# Patient Record
Sex: FEMALE | Race: WHITE | NOT HISPANIC OR LATINO | Employment: FULL TIME | URBAN - METROPOLITAN AREA
[De-identification: names, ages, dates, MRNs, and addresses within clinical notes are randomized per-mention and may not be internally consistent; named-entity substitution may affect disease eponyms.]

---

## 2020-02-05 NOTE — PROGRESS NOTES
"Pre-Visit Planning     Future Appointments   Date Time Provider Department Center   2/7/2020  8:30 AM Coming Salud Donovan MD [facility]     Arrival Time for this Appointment:  8:10 AM   Appointment Notes for this encounter:   Physical     Questionnaires Reviewed/Assigned        Patient preferred phone number: [phone]    Spoke to patient via phone. Patient does not have additional questions or concerns.        Visit is preventive. Reviewed purpose of preventive visit with patient.    Health Maintenance Due   Topic Date Due     DEXA  1950     HEPATITIS C SCREENING  1950     ANNUAL REVIEW OF HM ORDERS  1950     ADVANCE CARE PLANNING  1950     COLONOSCOPY  04/27/1960     DTAP/TDAP/TD IMMUNIZATION (1 - Tdap) 04/27/1961     LIPID  04/27/1995     ZOSTER IMMUNIZATION (1 of 2) 04/27/2000     MAMMO SCREENING  12/28/2014     MEDICARE ANNUAL WELLNESS VISIT  04/27/2015     FALL RISK ASSESSMENT  04/27/2015     PNEUMOCOCCAL IMMUNIZATION 65+ LOW/MEDIUM RISK (1 of 2 - PCV13) 04/27/2015     INFLUENZA VACCINE (1) 09/01/2019     PHQ-2  01/01/2020     Patient is due for:        [employer]  Patient is not active on [employer]. Encouraged [employer] activation.    Questionnaire Review   Advised patient to arrive early in order to complete questionnaires.    Call Summary  \"Thank you for your time today.  If anything comes up before your appointment, please feel free to contact us at [phone].\"  "

## 2020-02-07 ENCOUNTER — OFFICE VISIT (OUTPATIENT)
Dept: FAMILY MEDICINE | Facility: CLINIC | Age: 70
End: 2020-02-07
Payer: COMMERCIAL

## 2020-02-07 VITALS
HEART RATE: 68 BPM | WEIGHT: 200 LBS | SYSTOLIC BLOOD PRESSURE: 138 MMHG | RESPIRATION RATE: 16 BRPM | BODY MASS INDEX: 33.32 KG/M2 | TEMPERATURE: 97.9 F | DIASTOLIC BLOOD PRESSURE: 80 MMHG | HEIGHT: 65 IN

## 2020-02-07 DIAGNOSIS — I10 BENIGN ESSENTIAL HYPERTENSION: ICD-10-CM

## 2020-02-07 DIAGNOSIS — L98.9 SKIN LESION: ICD-10-CM

## 2020-02-07 DIAGNOSIS — Z12.31 VISIT FOR SCREENING MAMMOGRAM: ICD-10-CM

## 2020-02-07 DIAGNOSIS — Z13.820 SCREENING FOR OSTEOPOROSIS: ICD-10-CM

## 2020-02-07 DIAGNOSIS — Z00.00 ENCOUNTER FOR MEDICARE ANNUAL WELLNESS EXAM: Primary | ICD-10-CM

## 2020-02-07 DIAGNOSIS — E78.5 HYPERLIPIDEMIA, UNSPECIFIED HYPERLIPIDEMIA TYPE: ICD-10-CM

## 2020-02-07 LAB
ALBUMIN SERPL-MCNC: 3.5 G/DL (ref 3.4–5)
ALP SERPL-CCNC: 55 U/L (ref 40–150)
ALT SERPL W P-5'-P-CCNC: 29 U/L (ref 0–50)
ANION GAP SERPL CALCULATED.3IONS-SCNC: 5 MMOL/L (ref 3–14)
AST SERPL W P-5'-P-CCNC: 18 U/L (ref 0–45)
BILIRUB SERPL-MCNC: 0.6 MG/DL (ref 0.2–1.3)
BUN SERPL-MCNC: 17 MG/DL (ref 7–30)
CALCIUM SERPL-MCNC: 9.3 MG/DL (ref 8.5–10.1)
CHLORIDE SERPL-SCNC: 106 MMOL/L (ref 94–109)
CHOLEST SERPL-MCNC: 200 MG/DL
CO2 SERPL-SCNC: 28 MMOL/L (ref 20–32)
CREAT SERPL-MCNC: 0.9 MG/DL (ref 0.52–1.04)
ERYTHROCYTE [DISTWIDTH] IN BLOOD BY AUTOMATED COUNT: 15.5 % (ref 10–15)
GFR SERPL CREATININE-BSD FRML MDRD: 65 ML/MIN/{1.73_M2}
GLUCOSE SERPL-MCNC: 109 MG/DL (ref 70–99)
HCT VFR BLD AUTO: 42.4 % (ref 35–47)
HDLC SERPL-MCNC: 44 MG/DL
HGB BLD-MCNC: 13.2 G/DL (ref 11.7–15.7)
LDLC SERPL CALC-MCNC: 128 MG/DL
MCH RBC QN AUTO: 26.4 PG (ref 26.5–33)
MCHC RBC AUTO-ENTMCNC: 31.1 G/DL (ref 31.5–36.5)
MCV RBC AUTO: 85 FL (ref 78–100)
NONHDLC SERPL-MCNC: 156 MG/DL
PLATELET # BLD AUTO: 284 10E9/L (ref 150–450)
POTASSIUM SERPL-SCNC: 3.9 MMOL/L (ref 3.4–5.3)
PROT SERPL-MCNC: 8.2 G/DL (ref 6.8–8.8)
RBC # BLD AUTO: 5 10E12/L (ref 3.8–5.2)
SODIUM SERPL-SCNC: 139 MMOL/L (ref 133–144)
TRIGL SERPL-MCNC: 140 MG/DL
TSH SERPL DL<=0.005 MIU/L-ACNC: 2.21 MU/L (ref 0.4–4)
WBC # BLD AUTO: 7.3 10E9/L (ref 4–11)

## 2020-02-07 PROCEDURE — 80061 LIPID PANEL: CPT | Performed by: FAMILY MEDICINE

## 2020-02-07 PROCEDURE — 80053 COMPREHEN METABOLIC PANEL: CPT | Performed by: FAMILY MEDICINE

## 2020-02-07 PROCEDURE — 99397 PER PM REEVAL EST PAT 65+ YR: CPT | Performed by: FAMILY MEDICINE

## 2020-02-07 PROCEDURE — 99213 OFFICE O/P EST LOW 20 MIN: CPT | Mod: 25 | Performed by: FAMILY MEDICINE

## 2020-02-07 PROCEDURE — 84443 ASSAY THYROID STIM HORMONE: CPT | Performed by: FAMILY MEDICINE

## 2020-02-07 PROCEDURE — 36415 COLL VENOUS BLD VENIPUNCTURE: CPT | Performed by: FAMILY MEDICINE

## 2020-02-07 PROCEDURE — 85027 COMPLETE CBC AUTOMATED: CPT | Performed by: FAMILY MEDICINE

## 2020-02-07 RX ORDER — TRIAMTERENE/HYDROCHLOROTHIAZID 37.5-25 MG
1 TABLET ORAL
COMMUNITY
Start: 2019-12-18 | End: 2020-02-07

## 2020-02-07 RX ORDER — ATENOLOL 25 MG/1
25 TABLET ORAL
COMMUNITY
Start: 2019-12-18 | End: 2020-02-07

## 2020-02-07 RX ORDER — TRIAMTERENE/HYDROCHLOROTHIAZID 37.5-25 MG
1 TABLET ORAL DAILY
Qty: 90 TABLET | Refills: 3 | Status: SHIPPED | OUTPATIENT
Start: 2020-02-07 | End: 2021-02-16

## 2020-02-07 RX ORDER — ATENOLOL 25 MG/1
25 TABLET ORAL DAILY
Qty: 90 TABLET | Refills: 3 | Status: SHIPPED | OUTPATIENT
Start: 2020-02-07 | End: 2021-02-16

## 2020-02-07 ASSESSMENT — ENCOUNTER SYMPTOMS
FEVER: 0
COUGH: 0
NAUSEA: 0
WEAKNESS: 0
DIZZINESS: 0
CONSTIPATION: 0
ABDOMINAL PAIN: 0
SORE THROAT: 0
HEADACHES: 0
PALPITATIONS: 0
PARESTHESIAS: 0
NERVOUS/ANXIOUS: 0
HEMATURIA: 0
JOINT SWELLING: 1
EYE PAIN: 0
DYSURIA: 0
HEMATOCHEZIA: 0
FREQUENCY: 0
MYALGIAS: 0
ARTHRALGIAS: 1
SHORTNESS OF BREATH: 0
BREAST MASS: 0
CHILLS: 0
DIARRHEA: 0
HEARTBURN: 0

## 2020-02-07 ASSESSMENT — MIFFLIN-ST. JEOR: SCORE: 1433.07

## 2020-02-07 ASSESSMENT — ACTIVITIES OF DAILY LIVING (ADL): CURRENT_FUNCTION: NO ASSISTANCE NEEDED

## 2020-02-07 NOTE — PROGRESS NOTES
"SUBJECTIVE:   Ailyn Beck is a 69 year old female who presents for Preventive Visit.    1.  Needs annual physical, been a year since last visit.    2.  Needs meds refilled.    3.  Red spot on chest wall that showed up one day, present now for 2-3 months, was flat, has gotten raised.  Hasnt gotten any bigger, never changed other than becoming more raised, just hasnt gone away.  Denies itching or pain.  Has vitiligo.  Around the time of onset she tried a new lotion, but does not use every day, no other breakouts along chest or neck.  Hasn't used for a few weeks, but no change or resolution.  Are you in the first 12 months of your Medicare coverage?  No    Healthy Habits:     In general, how would you rate your overall health?  Good    Frequency of exercise:  1 day/week    Duration of exercise:  15-30 minutes    Do you usually eat at least 4 servings of fruit and vegetables a day, include whole grains    & fiber and avoid regularly eating high fat or \"junk\" foods?  No    Ability to successfully perform activities of daily living:  No assistance needed    Home Safety:  No safety concerns identified    Hearing Impairment:  No hearing concerns    In the past 6 months, have you been bothered by leaking of urine? Yes    In general, how would you rate your overall mental or emotional health?  Excellent      PHQ-2 Total Score: 0    Additional concerns today:  Yes    Do you feel safe in your environment? Yes    Have you ever done Advance Care Planning? (For example, a Health Directive, POLST, or a discussion with a medical provider or your loved ones about your wishes): No, advance care planning information given to patient to review.  Patient plans to discuss their wishes with loved ones or provider.        Fall risk  Fallen 2 or more times in the past year?: No  Any fall with injury in the past year?: No    Cognitive Screening   1) Repeat 3 items (Leader, Season, Table)    2) Clock draw: NORMAL  3) 3 item recall: Recalls 2 " objects   Results: NORMAL clock, 1-2 items recalled: COGNITIVE IMPAIRMENT LESS LIKELY    Mini-CogTM Copyright ANA LUISA Kennedy. Licensed by the author for use in [facility]; reprinted with permission (colt@[email or web address]). All rights reserved.      Do you have sleep apnea, excessive snoring or daytime drowsiness?: yes    Reviewed and updated as needed this visit by clinical staff  Tobacco  Allergies  Meds  Problems  Med Hx  Surg Hx  Fam Hx  Soc Hx          Reviewed and updated as needed this visit by Provider  Tobacco  Allergies  Meds  Problems  Med Hx  Surg Hx  Soc Hx       Social History     Tobacco Use     Smoking status: Never Smoker     Smokeless tobacco: Never Used   Substance Use Topics     Alcohol use: Not Currently     If you drink alcohol do you typically have >3 drinks per day or >7 drinks per week? Not applicable    Alcohol Use 2/7/2020   Prescreen: >3 drinks/day or >7 drinks/week? Not Applicable   Prescreen: >3 drinks/day or >7 drinks/week? -   No flowsheet data found.            Current providers sharing in care for this patient include:   Patient Care Team:  Salud Etienne MD as PCP - General (Family Practice)    The following health maintenance items are reviewed in Epic and correct as of today:  Health Maintenance   Topic Date Due     DEXA  1950     ANNUAL REVIEW OF HM ORDERS  1950     ADVANCE CARE PLANNING  1950     LIPID  04/27/1995     MAMMO SCREENING  12/28/2014     ZOSTER IMMUNIZATION (2 of 2) 04/03/2020     COLONOSCOPY  01/03/2021     MEDICARE ANNUAL WELLNESS VISIT  02/07/2021     FALL RISK ASSESSMENT  02/07/2021     DTAP/TDAP/TD IMMUNIZATION (2 - Td) 11/22/2027     PHQ-2  Completed     INFLUENZA VACCINE  Completed     PNEUMOCOCCAL IMMUNIZATION 65+ LOW/MEDIUM RISK  Completed     HEPATITIS C SCREENING  Addressed     IPV IMMUNIZATION  Aged Out     MENINGITIS IMMUNIZATION  Aged Out     Lab work is in process  Labs reviewed in EPIC  BP Readings from Last 3  Encounters:   02/07/20 138/80    Wt Readings from Last 3 Encounters:   02/07/20 90.7 kg (200 lb)                  Patient Active Problem List   Diagnosis     Elevated fasting glucose     Essential hypertension     Gout     HLD (hyperlipidemia)     Vitiligo     Past Surgical History:   Procedure Laterality Date     IN TOTAL HIP REPLACEMENT      right     XR ELBOW SURGERY VITALIY RIGHT         Social History     Tobacco Use     Smoking status: Never Smoker     Smokeless tobacco: Never Used   Substance Use Topics     Alcohol use: Not Currently     Family History   Problem Relation Age of Onset     Hypertension Mother      Emphysema Father      Breast Cancer Sister      Prostate Cancer Brother          Current Outpatient Medications   Medication Sig Dispense Refill     atenolol (TENORMIN) 25 MG tablet Take 1 tablet (25 mg) by mouth daily 1 tab qd 90 tablet 3     triamterene-HCTZ (MAXZIDE-25) 37.5-25 MG tablet Take 1 tablet by mouth daily 1 tab qd 90 tablet 3     No Known Allergies  Pneumonia Vaccine:Adults age 65+ who received Pneumovax (PPSV23) at 65 years or older: Should be given PCV13 > 1 year after their most recent PPSV23  Mammogram Screening: Mammogram Screening: Patient over age 50, mutual decision to screen reflected in health maintenance.  History of abnormal Pap smear: NO - age 65 - see link Cervical Cytology Screening Guidelines    Review of Systems   Constitutional: Negative for chills and fever.   HENT: Negative for congestion, ear pain, hearing loss and sore throat.    Eyes: Negative for pain and visual disturbance.   Respiratory: Negative for cough and shortness of breath.    Cardiovascular: Negative for chest pain, palpitations and peripheral edema.   Gastrointestinal: Negative for abdominal pain, constipation, diarrhea, heartburn, hematochezia and nausea.   Breasts:  Negative for tenderness, breast mass and discharge.   Genitourinary: Positive for urgency. Negative for dysuria, frequency, genital sores,  "hematuria, pelvic pain, vaginal bleeding and vaginal discharge.   Musculoskeletal: Positive for arthralgias and joint swelling. Negative for myalgias.   Skin: Negative for rash.   Neurological: Negative for dizziness, weakness, headaches and paresthesias.   Psychiatric/Behavioral: Negative for mood changes. The patient is not nervous/anxious.      Urinary urgency at night, no issues during the day.  Arthralgia and joint swelling: Gout, controls with diet changes.     OBJECTIVE:   /80 (BP Location: Right arm, Patient Position: Chair, Cuff Size: Adult Large)   Pulse 68   Temp 97.9  F (36.6  C) (Oral)   Resp 16   Ht 1.651 m (5' 5\")   Wt 90.7 kg (200 lb)   Breastfeeding No   BMI 33.28 kg/m   Estimated body mass index is 33.28 kg/m  as calculated from the following:    Height as of this encounter: 1.651 m (5' 5\").    Weight as of this encounter: 90.7 kg (200 lb).  Physical Exam  GENERAL APPEARANCE: healthy, alert and no distress  EYES: Eyes grossly normal to inspection, PERRL and conjunctivae and sclerae normal  HENT: ear canals and TM's normal, nose and mouth without ulcers or lesions, oropharynx clear and oral mucous membranes moist  NECK: no adenopathy, no asymmetry, masses, or scars and thyroid normal to palpation  RESP: lungs clear to auscultation - no rales, rhonchi or wheezes  BREAST: normal without masses, tenderness or nipple discharge and no palpable axillary masses or adenopathy  CV: regular rate and rhythm, normal S1 S2, no S3 or S4, no murmur, click or rub, no peripheral edema and peripheral pulses strong  ABDOMEN: soft, nontender, no hepatosplenomegaly, no masses and bowel sounds normal  MS: no musculoskeletal defects are noted and gait is age appropriate without ataxia  SKIN: 3mm raised, fleshy growth on right lateral cheek.  3mm pinkinsh raised lesion on anterior chest wall.  No surrounding erythema or edema for either, non tender.  NEURO: Normal strength and tone, sensory exam grossly " normal, mentation intact and speech normal  PSYCH: mentation appears normal and affect normal/bright    Diagnostic Test Results:  Labs reviewed in Epic    ASSESSMENT / PLAN:   1. Encounter for Medicare annual wellness exam  Exam completed today.  Updated health maintenance as appropriate. Routine screening labs today.  Recommend follow up in one year or sooner as needed.  - Comprehensive metabolic panel (BMP + Alb, Alk Phos, ALT, AST, Total. Bili, TP)  - CBC with platelets  - TSH with free T4 reflex  - REVIEW OF HEALTH MAINTENANCE PROTOCOL ORDERS    2. Skin lesions  Pt had two skin lesions, one on face and one on anterior upper chest wall.  Lesion on face looks like SK, discussed shave biopsy for confirmation.  Not bothersome, so patient will monitor and contact us if she would like procedure done.  Will recommend hydrocortisone cream to anterior chest wall lesion, 1% BID x 7 days.  Unsure if lesion is inflammatory or not, if no resolution after cream will consider biopsy or referral to Derm.  Follow up as needed.    3. Benign essential hypertension  BP well controlled, will refill meds.  Labs today.  Follow up one year or sooner as needed.  - atenolol (TENORMIN) 25 MG tablet; Take 1 tablet (25 mg) by mouth daily 1 tab qd  Dispense: 90 tablet; Refill: 3  - triamterene-HCTZ (MAXZIDE-25) 37.5-25 MG tablet; Take 1 tablet by mouth daily 1 tab qd  Dispense: 90 tablet; Refill: 3  - Lipid panel reflex to direct LDL Fasting  - Comprehensive metabolic panel (BMP + Alb, Alk Phos, ALT, AST, Total. Bili, TP)  - CBC with platelets    4. Hyperlipidemia, unspecified hyperlipidemia type  Ordered labs today, further recommendations pending results.  - Lipid panel reflex to direct LDL Fasting    5. Visit for screening mammogram  - *MA Screening Digital Bilateral; Future    6. Screening for osteoporosis  - DX Hip/Pelvis/Spine; Future    7. Need for vaccination  - Shingrix out at clinic, pt will need vaccine    COUNSELING:  Reviewed  "preventive health counseling, as reflected in patient instructions       Regular exercise       Healthy diet/nutrition       Immunizations    Vaccinated for: Zoster             Osteoporosis Prevention/Bone Health       Colon cancer screening       Hepatitis C screening       Advanced Planning     Estimated body mass index is 33.28 kg/m  as calculated from the following:    Height as of this encounter: 1.651 m (5' 5\").    Weight as of this encounter: 90.7 kg (200 lb).    Weight management plan: Discussed healthy diet and exercise guidelines     reports that she has never smoked. She has never used smokeless tobacco.      Appropriate preventive services were discussed with this patient, including applicable screening as appropriate for cardiovascular disease, diabetes, osteopenia/osteoporosis, and glaucoma.  As appropriate for age/gender, discussed screening for colorectal cancer, prostate cancer, breast cancer, and cervical cancer. Checklist reviewing preventive services available has been given to the patient.    Reviewed patients plan of care and provided an AVS. The Basic Care Plan (routine screening as documented in Health Maintenance) for Ailyn meets the Care Plan requirement. This Care Plan has been established and reviewed with the Patient.    Counseling Resources:  ATP IV Guidelines  Pooled Cohorts Equation Calculator  Breast Cancer Risk Calculator  FRAX Risk Assessment  ICSI Preventive Guidelines  Dietary Guidelines for Americans, 2010  USDA's MyPlate  ASA Prophylaxis  Lung CA Screening    April CATY Donovan MD  [facility]    Identified Health Risks:  "

## 2020-02-07 NOTE — LETTER
February 10, 2020      Ailyn Beck  [address] [address]        Dear ,    We are writing to inform you of your test results.    I am writing regarding your recent lab work.  Your TSH was normal, indicating your thyroid is working appropriately.  Your total cholesterol was right at the high end of normal.  Your triglycerides were normal.  Your HDL cholesterol was slightly low at 44, with our goal being greater than 49.  Your LDL cholesterol, the bad cholesterol, was slightly elevated at 128, with goal being less than 100.  Your non-HDL cholesterol was also slightly elevated at 156, with goal being less than 130.  At this time I do not feel that you need to be on a medication for lowering your cholesterol.  I will include some information on how to improve your cholesterol numbers with lifestyle modifications.     Your metabolic panel, which looks at your electrolytes (sodium, potassium, chloride, and calcium) glucose, kidney function (BUN, creatinine, GFR), and liver function (AST, ALT, alkaline phosphatase, total protein, albumin, and total bilirubin) was within normal limits, with the exception of your fasting blood sugar being elevated at 109.  This does put you at risk for prediabetes.  Lifestyle modifications including watching your processed sugars, weight loss, and increased exercise may help improve this number.  We can recheck this in 1 year.     Your CBC, which looks at your white blood cells, red blood cells, hemoglobin, and platelets, was entirely within normal limits.  The RDW, MCHC, and MCH are all measures of red blood cell size.  They are very minimally abnormal, which is not likely to cause any problems, as your red blood cell count and hemoglobin are within normal limits.  These can be rechecked in 1 year.     Please contact us with any questions or concerns you may have.     Resulted Orders   Lipid panel reflex to direct LDL Fasting   Result Value Ref Range     Cholesterol 200 (H) <200 mg/dL      Comment:      Desirable:       <200 mg/dl    Triglycerides 140 <150 mg/dL      Comment:      Fasting specimen    HDL Cholesterol 44 (L) >49 mg/dL    LDL Cholesterol Calculated 128 (H) <100 mg/dL      Comment:      Above desirable:  100-129 mg/dl  Borderline High:  130-159 mg/dL  High:             160-189 mg/dL  Very high:       >189 mg/dl      Non HDL Cholesterol 156 (H) <130 mg/dL      Comment:      Above Desirable:  130-159 mg/dl  Borderline high:  160-189 mg/dl  High:             190-219 mg/dl  Very high:       >219 mg/dl     Comprehensive metabolic panel (BMP + Alb, Alk Phos, ALT, AST, Total. Bili, TP)   Result Value Ref Range    Sodium 139 133 - 144 mmol/L    Potassium 3.9 3.4 - 5.3 mmol/L    Chloride 106 94 - 109 mmol/L    Carbon Dioxide 28 20 - 32 mmol/L    Anion Gap 5 3 - 14 mmol/L    Glucose 109 (H) 70 - 99 mg/dL      Comment:      Fasting specimen    Urea Nitrogen 17 7 - 30 mg/dL    Creatinine 0.90 0.52 - 1.04 mg/dL    GFR Estimate 65 >60 mL/min/[1.73_m2]      Comment:      Non  GFR Calc  Starting 12/18/2018, serum creatinine based estimated GFR (eGFR) will be   calculated using the Chronic Kidney Disease Epidemiology Collaboration   (CKD-EPI) equation.      GFR Estimate If Black 75 >60 mL/min/[1.73_m2]      Comment:       GFR Calc  Starting 12/18/2018, serum creatinine based estimated GFR (eGFR) will be   calculated using the Chronic Kidney Disease Epidemiology Collaboration   (CKD-EPI) equation.      Calcium 9.3 8.5 - 10.1 mg/dL    Bilirubin Total 0.6 0.2 - 1.3 mg/dL    Albumin 3.5 3.4 - 5.0 g/dL    Protein Total 8.2 6.8 - 8.8 g/dL    Alkaline Phosphatase 55 40 - 150 U/L    ALT 29 0 - 50 U/L    AST 18 0 - 45 U/L   CBC with platelets   Result Value Ref Range    WBC 7.3 4.0 - 11.0 10e9/L    RBC Count 5.00 3.8 - 5.2 10e12/L    Hemoglobin 13.2 11.7 - 15.7 g/dL    Hematocrit 42.4 35.0 - 47.0 %    MCV 85 78 - 100 fl    MCH 26.4 (L) 26.5 - 33.0  pg    MCHC 31.1 (L) 31.5 - 36.5 g/dL      Comment:      Results confirmed by repeat test    RDW 15.5 (H) 10.0 - 15.0 %    Platelet Count 284 150 - 450 10e9/L   TSH with free T4 reflex   Result Value Ref Range    TSH 2.21 0.40 - 4.00 mU/L       If you have any questions or concerns, please call the clinic at the number listed above.       Sincerely,        Salud Donovan MD

## 2020-02-07 NOTE — PATIENT INSTRUCTIONS
1.  [facility] [phone],   Toll Free: [phone]           Patient Education   Personalized Prevention Plan  You are due for the preventive services outlined below.  Your care team is available to assist you in scheduling these services.  If you have already completed any of these items, please share that information with your care team to update in your medical record.  Health Maintenance Due   Topic Date Due     Osteoporosis Screening  1950     Hepatitis C Screening  1950     ANNUAL REVIEW OF HM ORDERS  1950     Discuss Advance Care Planning  1950     Colonoscopy  1960     Cholesterol Lab  1995     Zoster (Shingles) Vaccine (1 of 2) 2000     Mammogram  2014     Annual Wellness Visit  2015     FALL RISK ASSESSMENT  2015     Flu Vaccine (1) 2019

## 2020-02-19 ENCOUNTER — TELEPHONE (OUTPATIENT)
Dept: FAMILY MEDICINE | Facility: CLINIC | Age: 70
End: 2020-02-19

## 2020-02-19 NOTE — TELEPHONE ENCOUNTER
Pt called back gave the message below pt says she is not due for colonoscopy until next year. mammogram scheduled.Sherley Epstein MA  [facility].

## 2020-02-19 NOTE — TELEPHONE ENCOUNTER
Summary:    Patient is due/failing the following:   COLONOSCOPY and MAMMOGRAM    Reviewed:  [x] CARE EVERYWHERE  [x] LAST OV NOTE INCLUDING ENDO  [x] FYI TAB  [x] LAST PANEL ENCOUNTER  [x] FUTURE APTS  [x] MYCHART STATUS   [x] IMMUNIZATIONS  Action needed:   Patient needs referral/order: mammogram and colonoscopy    Type of outreach:    Phone, left message for patient to call back.                                                                                Norma Swain MA

## 2020-03-02 ENCOUNTER — ANCILLARY PROCEDURE (OUTPATIENT)
Dept: MAMMOGRAPHY | Facility: CLINIC | Age: 70
End: 2020-03-02
Payer: COMMERCIAL

## 2020-03-02 DIAGNOSIS — Z12.31 VISIT FOR SCREENING MAMMOGRAM: ICD-10-CM

## 2020-03-02 PROCEDURE — 77067 SCR MAMMO BI INCL CAD: CPT | Mod: TC

## 2020-06-25 ENCOUNTER — TELEPHONE (OUTPATIENT)
Dept: FAMILY MEDICINE | Facility: CLINIC | Age: 70
End: 2020-06-25

## 2020-06-25 NOTE — TELEPHONE ENCOUNTER
Summary:    Patient is due/failing the following:   COLONOSCOPY    Reviewed:  [x] CARE EVERYWHERE  [x] LAST OV NOTE INCLUDING ENDO  [x] FYI TAB  [x] LAST PANEL ENCOUNTER  [x] FUTURE APTS  [x] MYCHART STATUS   [x] IMMUNIZATIONS  Action needed:   Patient needs referral/order: colonoscopy    Type of outreach:    Phone, left message for patient to call back.                                                                                Norma Swain MA

## 2020-06-25 NOTE — LETTER
[facility]  [address] [id number] [phone]  July 2, 2020    Ailyn Beck  [address] [address]    Dear Ailyn,    I care about your health and have reviewed your health plan. I have reviewed your medical conditions, medication list, and lab results and am making recommendations based on this review, to better manage your health.    You are in particular need of attention regarding:  -Colon Cancer Screening    I am recommending that you:  {recommendations:-schedule a COLONOSCOPY to look for colon cancer (due every 10 years or 5 years in higher risk situations.)   Colon cancer is now the second leading cause of death in the [place] for both men and women and there are over 130,000 new cases and 50,000 deaths per year from colon cancer.  Colonoscopies can prevent 90-95% of these deaths.  Problem lesions can be removed before they ever become cancer.  This test is not only looking for cancer, but also getting rid of precancerious lesions.  If you do not wish to do a colonoscopy or cannot afford to do one, at this time, there is another option. It is called a FIT test or Fecal Immunochemical Occult Blood Test (take home stool sample kit).  It does not replace the colonoscopy for colorectal cancer screening, but it can detect hidden bleeding in the lower colon.  It does need to be repeated every year and if a positive result is obtained, you would be referred for a colonoscopy.  If you have completed either one of these tests at another facility, please have the records sent to our clinic so that we can best coordinate your care.    Here is a list of Health Maintenance topics that are due now or due soon:  Health Maintenance Due   Topic Date Due     DEXA  1950     ZOSTER IMMUNIZATION (2 of 2) 04/03/2020       Please call us at [phone] (or use [employer]) to address the above recommendations.     Thank you  for trusting [facility] and we appreciate the opportunity to serve you.  We look forward to supporting your healthcare needs in the future.    Healthy Regards,    Your [facility] Team

## 2021-02-14 DIAGNOSIS — I10 BENIGN ESSENTIAL HYPERTENSION: ICD-10-CM

## 2021-02-16 RX ORDER — ATENOLOL 25 MG/1
TABLET ORAL
Qty: 90 TABLET | Refills: 0 | Status: SHIPPED | OUTPATIENT
Start: 2021-02-16 | End: 2021-05-07

## 2021-02-16 RX ORDER — TRIAMTERENE/HYDROCHLOROTHIAZID 37.5-25 MG
TABLET ORAL
Qty: 90 TABLET | Refills: 0 | Status: SHIPPED | OUTPATIENT
Start: 2021-02-16 | End: 2021-05-07

## 2021-02-16 NOTE — TELEPHONE ENCOUNTER
3 month candelaria refill approved.    MA/TC: Please assist patient in scheduling office visit.     Annia Ellison RN on 2/16/2021 at 10:20 AM

## 2021-03-05 ENCOUNTER — IMMUNIZATION (OUTPATIENT)
Dept: NURSING | Facility: CLINIC | Age: 71
End: 2021-03-05
Payer: COMMERCIAL

## 2021-03-05 PROCEDURE — 91301 PR COVID VAC MODERNA 100 MCG/0.5 ML IM: CPT

## 2021-03-05 PROCEDURE — 0011A PR COVID VAC MODERNA 100 MCG/0.5 ML IM: CPT

## 2021-03-08 ENCOUNTER — OFFICE VISIT (OUTPATIENT)
Dept: FAMILY MEDICINE | Facility: CLINIC | Age: 71
End: 2021-03-08
Payer: COMMERCIAL

## 2021-03-08 VITALS
SYSTOLIC BLOOD PRESSURE: 126 MMHG | HEIGHT: 65 IN | DIASTOLIC BLOOD PRESSURE: 76 MMHG | RESPIRATION RATE: 18 BRPM | BODY MASS INDEX: 33.42 KG/M2 | TEMPERATURE: 97.7 F | WEIGHT: 200.6 LBS | HEART RATE: 68 BPM | OXYGEN SATURATION: 98 %

## 2021-03-08 DIAGNOSIS — E78.5 HYPERLIPIDEMIA, UNSPECIFIED HYPERLIPIDEMIA TYPE: ICD-10-CM

## 2021-03-08 DIAGNOSIS — R73.01 ELEVATED FASTING GLUCOSE: ICD-10-CM

## 2021-03-08 DIAGNOSIS — I10 BENIGN ESSENTIAL HYPERTENSION: Primary | ICD-10-CM

## 2021-03-08 DIAGNOSIS — Z78.0 POSTMENOPAUSAL STATUS: ICD-10-CM

## 2021-03-08 DIAGNOSIS — Z12.11 COLON CANCER SCREENING: ICD-10-CM

## 2021-03-08 DIAGNOSIS — M1A.09X0 CHRONIC GOUT OF MULTIPLE SITES, UNSPECIFIED CAUSE: ICD-10-CM

## 2021-03-08 DIAGNOSIS — R21 RASH: ICD-10-CM

## 2021-03-08 LAB
ERYTHROCYTE [DISTWIDTH] IN BLOOD BY AUTOMATED COUNT: 15.6 % (ref 10–15)
HBA1C MFR BLD: 5.4 % (ref 0–5.6)
HCT VFR BLD AUTO: 42.6 % (ref 35–47)
HGB BLD-MCNC: 13.2 G/DL (ref 11.7–15.7)
MCH RBC QN AUTO: 26.4 PG (ref 26.5–33)
MCHC RBC AUTO-ENTMCNC: 31 G/DL (ref 31.5–36.5)
MCV RBC AUTO: 85 FL (ref 78–100)
PLATELET # BLD AUTO: 381 10E9/L (ref 150–450)
RBC # BLD AUTO: 5 10E12/L (ref 3.8–5.2)
WBC # BLD AUTO: 6.7 10E9/L (ref 4–11)

## 2021-03-08 PROCEDURE — 80053 COMPREHEN METABOLIC PANEL: CPT | Performed by: FAMILY MEDICINE

## 2021-03-08 PROCEDURE — 85027 COMPLETE CBC AUTOMATED: CPT | Performed by: FAMILY MEDICINE

## 2021-03-08 PROCEDURE — 99214 OFFICE O/P EST MOD 30 MIN: CPT | Performed by: FAMILY MEDICINE

## 2021-03-08 PROCEDURE — 84443 ASSAY THYROID STIM HORMONE: CPT | Performed by: FAMILY MEDICINE

## 2021-03-08 PROCEDURE — 36415 COLL VENOUS BLD VENIPUNCTURE: CPT | Performed by: FAMILY MEDICINE

## 2021-03-08 PROCEDURE — 83036 HEMOGLOBIN GLYCOSYLATED A1C: CPT | Performed by: FAMILY MEDICINE

## 2021-03-08 PROCEDURE — 84550 ASSAY OF BLOOD/URIC ACID: CPT | Performed by: FAMILY MEDICINE

## 2021-03-08 PROCEDURE — 80061 LIPID PANEL: CPT | Performed by: FAMILY MEDICINE

## 2021-03-08 ASSESSMENT — MIFFLIN-ST. JEOR: SCORE: 1430.8

## 2021-03-08 NOTE — PROGRESS NOTES
"    Assessment & Plan     Benign essential hypertension  Due for labs, well controlled, refill medications today.  Follow up one year or sooner as needed.  - CBC with platelets  - Comprehensive metabolic panel (BMP + Alb, Alk Phos, ALT, AST, Total. Bili, TP)  - Lipid panel reflex to direct LDL Fasting    Chronic gout of multiple sites, unspecified cause  Will check uric acid level today, pt will continue with OTC ibuprofen as needed.  Provided with some dietary information in AVS.  - Uric acid    Elevated fasting glucose  Due for labs, further recommendations pending results.  - Hemoglobin A1c  - TSH with free T4 reflex    Hyperlipidemia, unspecified hyperlipidemia type  Due for labs, further recommendations pending results.    Rash  Dry skin, possible nonspecific dermatitis.  OTC Hydrocortisone BID x 7 days, follow up as needed.    Colon cancer screening  - GASTROENTEROLOGY ADULT REF PROCEDURE ONLY; Future    Postmenopausal status  - DX Hip/Pelvis/Spine; Future    18 minutes spent on the date of the encounter doing chart review, history and exam, documentation and further activities as noted above       BMI:   Estimated body mass index is 33.38 kg/m  as calculated from the following:    Height as of this encounter: 1.651 m (5' 5\").    Weight as of this encounter: 91 kg (200 lb 9.6 oz).   Weight management plan: Discussed healthy diet and exercise guidelines    See Patient Instructions    Return in about 1 year (around 3/8/2022) for BP Recheck.    Salud Donovan MD  [facility] GUERA Robertson is a 70 year old who presents for the following health issues     HPI       Hypertension Follow-up      Do you check your blood pressure regularly outside of the clinic? Yes     Are you following a low salt diet? Attempting    Are your blood pressures ever more than 140 on the top number (systolic) OR more than 90 on the bottom number (diastolic), for example 140/90? Yes - Upon forgetting " "Medication      How many servings of fruits and vegetables do you eat daily?  2-3    On average, how many sweetened beverages do you drink each day (Examples: soda, juice, sweet tea, etc.  Do NOT count diet or artificially sweetened beverages)?   0    How many days per week do you exercise enough to make your heart beat faster? 3 or less    How many minutes a day do you exercise enough to make your heart beat faster? 20 - 29    How many days per week do you miss taking your medication? 0    Reports BP is good overall, no concerns about this.  Well controlled with the exception of the few times she forgets her meds.  Due for labs.    Gout in right middle finger, flared today, takes ibuprofen, seems to resolve on its own.  Alternates with Tylenol when needed.  Reports she frequently has gout in multiple sites, though finger is most common for her.  Feels that her OTC meds control pain well enough.  Scared to take other medications due to kidney implications, as her  had a kidney transplant and passed from kidney failure.    Slight rash on her mid back, somewhat itchy.  Would like it looked at, as her brother's have had skin cancers in the past.    Review of Systems   Constitutional, HEENT, cardiovascular, pulmonary, gi and gu systems are negative, except as otherwise noted.      Objective    /76 (BP Location: Right arm, Patient Position: Chair, Cuff Size: Adult Large)   Pulse 68   Temp 97.7  F (36.5  C) (Oral)   Resp 18   Ht 1.651 m (5' 5\")   Wt 91 kg (200 lb 9.6 oz)   SpO2 98%   BMI 33.38 kg/m    Body mass index is 33.38 kg/m .  Physical Exam   GENERAL: healthy, alert and no distress  EYES: Eyes grossly normal to inspection, PERRL and conjunctivae and sclerae normal  RESP: lungs clear to auscultation - no rales, rhonchi or wheezes  CV: regular rate and rhythm, normal S1 S2, no S3 or S4, no murmur, click or rub, no peripheral edema and peripheral pulses strong  MS: Right 3rd digit is inflamed at " the PIP joint, slight deformity in lateral direction.  SKIN: Slightly erythematous dry skin along spine, mid back.  PSYCH: mentation appears normal, affect normal/bright

## 2021-03-08 NOTE — PATIENT INSTRUCTIONS
Hydrocortisone, 0.5% or 1%, twice per day for 7 days.    [facility] [phone],   Toll Free: [phone]       Patient Education     Gout Diet  Gout is a painful condition caused by an excess of uric acid, a waste product made by the body. Uric acid forms crystals that collect in the joints. The immune response to these crystals brings on symptoms of joint pain and swelling. This is called a gout attack. Often, medications and diet changes are combined to manage gout. Below are some guidelines for changing your diet to help you manage gout and prevent attacks. Your healthcare provider will help you determine the best eating plan for you.  Eating to manage gout  Weight loss for those who are overweight may help reduce gout attacks.  Eat less of these foods  Eating too many foods containing purines may raise the levels of uric acid in your body. This raises your risk for a gout attack. Try to limit these foods and drinks:    Alcohol, such as beer and red wine. You may be told to avoid alcohol completely.    Soft drinks that contain sugar or high fructose corn syrup    Certain fish, including anchovies, sardines, fish eggs, and herring    Shellfish    Certain meats, such as red meat, hot dogs, luncheon meats, and turkey    Organ meats, such as liver, kidneys, and sweetbreads    Legumes, such as dried beans and peas    Other high fat foods such as gravy, whole milk, and high fat cheeses    Vegetables such as asparagus, cauliflower, spinach, and mushrooms used to be thought to contribute to an increased risk for a gout attack, but recent studies show that high purine vegetables don't increase the risk for a gout attack.  Eat more of these foods  Other foods may be helpful for people with gout. Add some of these foods to your diet:    Cherries contain chemicals that may lower uric acid.    Omega fatty acids. These are found in some fatty fish such as salmon, certain oils  (flax, olive, or nut), and nuts themselves. Omega fatty acids may help prevent inflammation due to gout.    Dairy products that are low-fat or fat-free, such as cheese and yogurt    Complex carbohydrate foods, including whole grains, brown rice, oats, and beans    Coffee, in moderation    Water, approximately 64 ounces per day  Follow-up care  Follow up with your healthcare provider as advised.  When to seek medical advice  Call your healthcare provider right away if any of these occur:    Return of gout symptoms, usually at night:    Severe pain, swelling, and heat in a joint, especially the base of the big toe    Affected joint is hard to move    Skin of the affected joint is purple or red    Fever of 100.4 F (38 C) or higher    Pain that doesn't get better even with prescribed medicine   [employer] last reviewed this educational content on 6/1/2018 2000-2020 [employer]. All rights reserved. This information is not intended as a substitute for professional medical care. Always follow your healthcare professional's instructions.           Patient Education     Treating Gout Attacks     Raising the joint above the level of your heart can help reduce gout symptoms.     Gout is a disease that affects the joints. It is caused by excess uric acid in your blood that may lead to crystals forming in your joints. Left untreated, it can lead to painful foot and joint deformities and even kidney problems. But, by treating gout early, you can relieve pain and help prevent future problems. Gout can usually be treated with medicine and proper diet. In severe cases, surgery may be needed.  Gout attacks are painful and often happen more than once. Taking medicines may reduce pain and prevent attacks in the future. There are also some things you can do at home to relieve symptoms.  Medicines for gout  Your healthcare provider may prescribe a daily medicine to reduce levels of uric acid. Reducing your uric acid  levels may help prevent gout attacks. Allopurinol is one commonly used medicine taken daily to reduce uric acid levels. Other daily medicines used to reduce uric acid levels include febuxostat, lesinurad, and probencid. Other medicines can help relieve pain and swelling during an acute attack. Medicines such as NSAIDs (nonsteroidal anti-inflammatory medicines), steroids, and colchicine may be prescribed for intermittent use to relieve an acute gout attack. Be sure to take your medicine as directed.  What you can do  Below are some things you can do at home to relieve gout symptoms. Your healthcare provider may have other tips.    Rest the painful joint as much as you can.    Raise the painful joint so it is at a level higher than your heart.    Use ice for 10 minutes every 1 to 2 hours as possible.  How can I prevent gout?  With a little effort, you may be able to prevent gout attacks in the future. Here are some things you can do:    Don't eat foods high in purines  ? Certain meats (red meat, processed meat, turkey)  ? Organ meats (kidney, liver, sweetbread)  ? Shellfish (lobster, crab, shrimp, scallop, mussel)  ? Certain fish (anchovy, sardine, herring, mackerel)    Take any medicines prescribed by your healthcare provider.    Lose weight if you need to.    Reduce high fructose corn syrup in meals and drinks.    Reduce or cut out alcohol, particularly beer, but also red wine and spirits.    Control blood pressure, diabetes, and cholesterol.    Drink plenty of water to help flush uric acid from your body.  [employer] last reviewed this educational content on 4/1/2018 2000-2020 [employer]. All rights reserved. This information is not intended as a substitute for professional medical care. Always follow your healthcare professional's instructions.

## 2021-03-09 LAB
ALBUMIN SERPL-MCNC: 3.4 G/DL (ref 3.4–5)
ALP SERPL-CCNC: 50 U/L (ref 40–150)
ALT SERPL W P-5'-P-CCNC: 24 U/L (ref 0–50)
ANION GAP SERPL CALCULATED.3IONS-SCNC: 8 MMOL/L (ref 3–14)
AST SERPL W P-5'-P-CCNC: 21 U/L (ref 0–45)
BILIRUB SERPL-MCNC: 0.5 MG/DL (ref 0.2–1.3)
BUN SERPL-MCNC: 25 MG/DL (ref 7–30)
CALCIUM SERPL-MCNC: 9.1 MG/DL (ref 8.5–10.1)
CHLORIDE SERPL-SCNC: 104 MMOL/L (ref 94–109)
CHOLEST SERPL-MCNC: 181 MG/DL
CO2 SERPL-SCNC: 25 MMOL/L (ref 20–32)
CREAT SERPL-MCNC: 1.09 MG/DL (ref 0.52–1.04)
GFR SERPL CREATININE-BSD FRML MDRD: 51 ML/MIN/{1.73_M2}
GLUCOSE SERPL-MCNC: 109 MG/DL (ref 70–99)
HDLC SERPL-MCNC: 41 MG/DL
LDLC SERPL CALC-MCNC: 109 MG/DL
NONHDLC SERPL-MCNC: 140 MG/DL
POTASSIUM SERPL-SCNC: 3.2 MMOL/L (ref 3.4–5.3)
PROT SERPL-MCNC: 8.2 G/DL (ref 6.8–8.8)
SODIUM SERPL-SCNC: 137 MMOL/L (ref 133–144)
TRIGL SERPL-MCNC: 156 MG/DL
TSH SERPL DL<=0.005 MIU/L-ACNC: 1.67 MU/L (ref 0.4–4)
URATE SERPL-MCNC: 9.8 MG/DL (ref 2.6–6)

## 2021-03-12 DIAGNOSIS — M1A.09X0 CHRONIC GOUT OF MULTIPLE SITES, UNSPECIFIED CAUSE: Primary | ICD-10-CM

## 2021-03-12 DIAGNOSIS — N18.31 STAGE 3A CHRONIC KIDNEY DISEASE (H): ICD-10-CM

## 2021-03-14 ENCOUNTER — HEALTH MAINTENANCE LETTER (OUTPATIENT)
Age: 71
End: 2021-03-14

## 2021-04-02 ENCOUNTER — IMMUNIZATION (OUTPATIENT)
Dept: NURSING | Facility: CLINIC | Age: 71
End: 2021-04-02
Attending: INTERNAL MEDICINE
Payer: COMMERCIAL

## 2021-04-02 PROCEDURE — 0012A PR COVID VAC MODERNA 100 MCG/0.5 ML IM: CPT

## 2021-04-02 PROCEDURE — 91301 PR COVID VAC MODERNA 100 MCG/0.5 ML IM: CPT

## 2021-05-06 DIAGNOSIS — I10 BENIGN ESSENTIAL HYPERTENSION: ICD-10-CM

## 2021-05-07 RX ORDER — TRIAMTERENE/HYDROCHLOROTHIAZID 37.5-25 MG
TABLET ORAL
Qty: 30 TABLET | Refills: 0 | Status: SHIPPED | OUTPATIENT
Start: 2021-05-07 | End: 2021-05-24

## 2021-05-07 RX ORDER — ATENOLOL 25 MG/1
TABLET ORAL
Qty: 90 TABLET | Refills: 3 | Status: SHIPPED | OUTPATIENT
Start: 2021-05-07 | End: 2022-03-21

## 2021-05-07 NOTE — TELEPHONE ENCOUNTER
Patient did not follow up on the recheck of her potassium, please call her to schedule for additional refills

## 2021-05-19 DIAGNOSIS — N18.31 STAGE 3A CHRONIC KIDNEY DISEASE (H): ICD-10-CM

## 2021-05-19 DIAGNOSIS — M1A.09X0 CHRONIC GOUT OF MULTIPLE SITES, UNSPECIFIED CAUSE: ICD-10-CM

## 2021-05-19 PROCEDURE — 84550 ASSAY OF BLOOD/URIC ACID: CPT | Performed by: FAMILY MEDICINE

## 2021-05-19 PROCEDURE — 80053 COMPREHEN METABOLIC PANEL: CPT | Performed by: FAMILY MEDICINE

## 2021-05-19 PROCEDURE — 36415 COLL VENOUS BLD VENIPUNCTURE: CPT | Performed by: FAMILY MEDICINE

## 2021-05-20 LAB
ALBUMIN SERPL-MCNC: 3.8 G/DL (ref 3.4–5)
ALP SERPL-CCNC: 59 U/L (ref 40–150)
ALT SERPL W P-5'-P-CCNC: 28 U/L (ref 0–50)
ANION GAP SERPL CALCULATED.3IONS-SCNC: 5 MMOL/L (ref 3–14)
AST SERPL W P-5'-P-CCNC: 20 U/L (ref 0–45)
BILIRUB SERPL-MCNC: 0.5 MG/DL (ref 0.2–1.3)
BUN SERPL-MCNC: 22 MG/DL (ref 7–30)
CALCIUM SERPL-MCNC: 9.1 MG/DL (ref 8.5–10.1)
CHLORIDE SERPL-SCNC: 102 MMOL/L (ref 94–109)
CO2 SERPL-SCNC: 27 MMOL/L (ref 20–32)
CREAT SERPL-MCNC: 0.92 MG/DL (ref 0.52–1.04)
GFR SERPL CREATININE-BSD FRML MDRD: 62 ML/MIN/{1.73_M2}
GLUCOSE SERPL-MCNC: 94 MG/DL (ref 70–99)
POTASSIUM SERPL-SCNC: 4.1 MMOL/L (ref 3.4–5.3)
PROT SERPL-MCNC: 8.9 G/DL (ref 6.8–8.8)
SODIUM SERPL-SCNC: 134 MMOL/L (ref 133–144)
URATE SERPL-MCNC: 8.7 MG/DL (ref 2.6–6)

## 2021-05-24 DIAGNOSIS — I10 BENIGN ESSENTIAL HYPERTENSION: ICD-10-CM

## 2021-05-26 RX ORDER — TRIAMTERENE/HYDROCHLOROTHIAZID 37.5-25 MG
1 TABLET ORAL DAILY
Qty: 90 TABLET | Refills: 2 | Status: SHIPPED | OUTPATIENT
Start: 2021-05-26 | End: 2022-01-03

## 2021-07-26 ENCOUNTER — E-VISIT (OUTPATIENT)
Dept: FAMILY MEDICINE | Facility: CLINIC | Age: 71
End: 2021-07-26
Payer: COMMERCIAL

## 2021-07-26 DIAGNOSIS — M10.9 ACUTE GOUT OF LEFT WRIST, UNSPECIFIED CAUSE: Primary | ICD-10-CM

## 2021-07-26 PROCEDURE — 99421 OL DIG E/M SVC 5-10 MIN: CPT | Performed by: FAMILY MEDICINE

## 2021-07-28 RX ORDER — METHYLPREDNISOLONE 4 MG
TABLET, DOSE PACK ORAL
Qty: 21 TABLET | Refills: 0 | Status: SHIPPED | OUTPATIENT
Start: 2021-07-28 | End: 2022-03-06

## 2021-07-28 NOTE — TELEPHONE ENCOUNTER
Provider E-Visit time total (minutes): 8 minutes    Patient has history of gout with acute flare in left wrist.  History of CKD 3, so will avoid NSAIDs.  Treat with Medrol dose pack.

## 2021-07-28 NOTE — PATIENT INSTRUCTIONS
Jamarcus Robertson,    Thank you for choosing us for your care. I have placed an order for a prescription steroid pack so that you can start treatment. View your full visit summary for details by clicking on the link below. Your pharmacist will able to address any questions you may have about the medication.     If you're not feeling better within 5-7 days, please schedule an appointment.  You can schedule an appointment right here in [facility], or call [phone]  If the visit is for the same symptoms as your eVisit, we'll refund the cost of your eVisit if seen within seven days.

## 2021-10-24 ENCOUNTER — HEALTH MAINTENANCE LETTER (OUTPATIENT)
Age: 71
End: 2021-10-24

## 2022-03-01 ENCOUNTER — NURSE TRIAGE (OUTPATIENT)
Dept: FAMILY MEDICINE | Facility: CLINIC | Age: 72
End: 2022-03-01
Payer: COMMERCIAL

## 2022-03-01 NOTE — TELEPHONE ENCOUNTER
I would recommend that patient be seen to evaluate the concerns, to rule out possible infection, etc.  I would prefer she not wait until our visit on the 21st.

## 2022-03-01 NOTE — TELEPHONE ENCOUNTER
"S-(situation): Patient has what she thinks is a gout flair up    B-(background): patient has history of gout and kidney disease. A week and a half ago patient's pointer and ring finger on the left hand started to hurt.     A-(assessment): Pain is a 2-3 at rest but will spike up to an 8 with movement. The joints on the pointer and ring finger of the left hand are red, inflamed and painful to the touch. Patient has been taking tylenol and IBU for pain but has not found it fully effective at controlling the pain.     R-(recommendations): Requested patient be seen in  today for evaluation and blood work. Patient declined that she just wanted to know what Dr. Santo Donovan thought. She thinks she can wait until her scheduled appointment on 3/21/22 if needed.       Reason for Disposition    Looks infected (e.g., spreading redness, pus, red streak)    Answer Assessment - Initial Assessment Questions  1. ONSET: \"When did the pain start?\"       Started a week ago  2. LOCATION and RADIATION: \"Where is the pain located?\"  (e.g., fingertip, around nail, joint, entire   finger)       Left hand pointer finger and ring finger in the joints   3. SEVERITY: \"How bad is the pain?\" \"What does it keep you from doing?\"   (Scale 1-10; or mild, moderate, severe)   - MILD - doesn't interfere with normal activities    - MODERATE - interferes with normal activities or awakens from sleep   - SEVERE - excruciating pain, unable to hold a glass of water or bend finger even a little      Pain is a 8 with movement but at rest pain is a 2-3.   4. APPEARANCE: \"What does the finger look like?\" (e.g., redness, swelling, bruising, pallor)      Red and swollen.   5. WORK OR EXERCISE: \"Has there been any recent work or exercise that involved this part of the body?\"      No   6. CAUSE: \"What do you think is causing the pain?\"      Gout flair up  7. AGGRAVATING FACTORS: \"What makes the pain worse?\" (e.g., using computer)      Using a computer.   8. OTHER " "SYMPTOMS: \"Do you have any other symptoms?\" (e.g., fever, neck pain, numbness)      none  9. PREGNANCY: \"Is there any chance you are pregnant?\" \"When was your last menstrual period?\"      n/a    Protocols used: FINGER PAIN-A-[place]    Annia Ellison RN on 3/1/2022 at 4:53 PM      "

## 2022-03-02 NOTE — TELEPHONE ENCOUNTER
"Called and spoke with patient. Relayed provider recommendation below. Patient states \"the two fingers that were bad, one is improving. Not as red, not as painful, not as swollen, I'm able to bend my finger. Feels like I'm turning the corner.\". Scheduled earlier appointment with SRENE 3/4/22 for evaluation. Advised if new/worsening symptoms to be seen in UC. Patient verbalized understanding and agreed with plan.     Henrry AZUL RN    "

## 2022-03-04 ENCOUNTER — OFFICE VISIT (OUTPATIENT)
Dept: FAMILY MEDICINE | Facility: CLINIC | Age: 72
End: 2022-03-04
Payer: COMMERCIAL

## 2022-03-04 VITALS
TEMPERATURE: 97.8 F | OXYGEN SATURATION: 97 % | HEART RATE: 80 BPM | SYSTOLIC BLOOD PRESSURE: 121 MMHG | DIASTOLIC BLOOD PRESSURE: 78 MMHG | WEIGHT: 192.8 LBS | BODY MASS INDEX: 32.12 KG/M2 | RESPIRATION RATE: 16 BRPM | HEIGHT: 65 IN

## 2022-03-04 DIAGNOSIS — N18.31 STAGE 3A CHRONIC KIDNEY DISEASE (H): ICD-10-CM

## 2022-03-04 DIAGNOSIS — M10.042 ACUTE IDIOPATHIC GOUT OF LEFT HAND: Primary | ICD-10-CM

## 2022-03-04 LAB
BASOPHILS # BLD AUTO: 0 10E3/UL (ref 0–0.2)
BASOPHILS NFR BLD AUTO: 0 %
EOSINOPHIL # BLD AUTO: 0.2 10E3/UL (ref 0–0.7)
EOSINOPHIL NFR BLD AUTO: 2 %
ERYTHROCYTE [DISTWIDTH] IN BLOOD BY AUTOMATED COUNT: 15.7 % (ref 10–15)
HCT VFR BLD AUTO: 41.2 % (ref 35–47)
HGB BLD-MCNC: 12.6 G/DL (ref 11.7–15.7)
LYMPHOCYTES # BLD AUTO: 1.5 10E3/UL (ref 0.8–5.3)
LYMPHOCYTES NFR BLD AUTO: 14 %
MCH RBC QN AUTO: 26.4 PG (ref 26.5–33)
MCHC RBC AUTO-ENTMCNC: 30.6 G/DL (ref 31.5–36.5)
MCV RBC AUTO: 86 FL (ref 78–100)
MONOCYTES # BLD AUTO: 0.7 10E3/UL (ref 0–1.3)
MONOCYTES NFR BLD AUTO: 7 %
NEUTROPHILS # BLD AUTO: 7.8 10E3/UL (ref 1.6–8.3)
NEUTROPHILS NFR BLD AUTO: 77 %
PLATELET # BLD AUTO: 356 10E3/UL (ref 150–450)
RBC # BLD AUTO: 4.78 10E6/UL (ref 3.8–5.2)
WBC # BLD AUTO: 10.2 10E3/UL (ref 4–11)

## 2022-03-04 PROCEDURE — 36415 COLL VENOUS BLD VENIPUNCTURE: CPT | Performed by: NURSE PRACTITIONER

## 2022-03-04 PROCEDURE — 99214 OFFICE O/P EST MOD 30 MIN: CPT | Performed by: NURSE PRACTITIONER

## 2022-03-04 PROCEDURE — 85025 COMPLETE CBC W/AUTO DIFF WBC: CPT | Performed by: NURSE PRACTITIONER

## 2022-03-04 PROCEDURE — 84550 ASSAY OF BLOOD/URIC ACID: CPT | Performed by: NURSE PRACTITIONER

## 2022-03-04 PROCEDURE — 80053 COMPREHEN METABOLIC PANEL: CPT | Performed by: NURSE PRACTITIONER

## 2022-03-04 RX ORDER — PREDNISONE 20 MG/1
TABLET ORAL
Qty: 20 TABLET | Refills: 0 | Status: SHIPPED | OUTPATIENT
Start: 2022-03-04 | End: 2022-03-21

## 2022-03-04 NOTE — PATIENT INSTRUCTIONS
Patient Education     Eating to Prevent Gout  Gout is a painful form of arthritis caused by an excess of uric acid. This is a waste product made by the body. It builds up in the body and forms crystals that collect in the joints, causing a gout attack. Alcohol and certain foods can trigger a gout attack. Below are some guidelines for changing your diet to help you manage gout. Your healthcare provider can work with you to determine the best eating plan for you. You can learn which foods affect your gout more than others. Reactions to different foods can vary from person to person. Know that diet is only one part of managing gout. Take your medicines as prescribed and follow the other guidelines your healthcare provider has given you.   Foods to limit  Eating too many foods containing purines may increase the levels of uric acid in your body and increase your risk for a gout attack. It may be best to limit these high-purine foods:     Alcohol (beer, hard liquor and red wine). You may be told to give up alcohol completely.    Certain fish (anchovies, sardines, fish roes, herring, tuna, mussels, codfish, scallops, trout, and usman)    Certain meats (red meat, processed meat, sosa, turkey, wild game, and goose)    Sauces and gravies made with meat    Organ meats (such as liver, kidneys, sweetbreads, and tripe)    Legumes (such as dried beans and peas)    Mushrooms, spinach, asparagus, and cauliflower    Yeast and yeast extract supplements  Foods to try  Some foods may be helpful for people with gout. You may want to try adding some of the following foods to your diet:     Dark berries. These include blueberries, blackberries, and cherries. These berries contain chemicals that may lower uric acid.    Tofu. Tofu, which is made from soy, is a good source of protein. Studies have shown that it may be a better choice than meat for people with gout.    Omega fatty acids. These acids are found in fatty fish (such as  salmon), certain oils (such as flax, olive, or nut oils), or nuts. They may help prevent inflammation due to gout.  Gout guidelines  The following guidelines are recommended by the Academy of Nutrition and Dietetics for people with gout. Your diet should be:     High in fiber, whole grains, fruits, and vegetables    Low in protein. About 15% of calories should come from protein. Choose lean sources, such as soy, lean meats, and poultry without the skin.    Low in fat. No more than 30% of calories should come from fat, with only 10% coming from animal (saturated) fat.  [employer] last reviewed this educational content on 8/1/2020 2000-2021 [employer]. All rights reserved. This information is not intended as a substitute for professional medical care. Always follow your healthcare professional's instructions.

## 2022-03-04 NOTE — PROGRESS NOTES
Assessment & Plan     Acute idiopathic gout of left hand:  Will place on prednisone taper, may also take tylenol prn.  Uric acid and CBC in process.    - CBC with Platelets & Differential  - Comprehensive metabolic panel  - Uric acid  - predniSONE (DELTASONE) 20 MG tablet  Dispense: 20 tablet; Refill: 0  - CBC with Platelets & Differential  - Comprehensive metabolic panel  - Uric acid    Stage 3a chronic kidney disease (H):  Creatinine in process.  956}     Return in about 17 days (around 3/21/2022) for Physical Exam.    Susan Haase, APRN CNP  [facility] BARBARA Robertson is a 71 year old who presents for the following health issues     HPI     Gout/ Single Inflamed Joint  Onset/Duration: X2 weeks   Description:   Location: fingers - left  Joint Swelling: YES  Redness: YES  Pain: YES- throbbing and achy   Intensity: mild, moderate, severe  Progression of Symptoms: same  Accompanying Signs & Symptoms:  Fevers: no  History:   Trauma to the area: no  Previous history of gout: YES  Recent illness: no  Alcohol use: no  Diuretic use: YES-   Precipitating or alleviating factors: None  Therapies tried and outcome:ibuprofen   Pain of the left pointer and ring finger that started about 2 weeks ago, yesterday the thumb also started to feel painful.  Joints of fingers are swollen, tender, warm and slightly red in color.  Has a constant ache in the joints, rates pain 5/10 with movement and 10/10 if finger bump up against anything.  Has been taking ibuprofen 600 mg tid and tylenol 650 mg bid (alternating) with slight relief.      History of gout in toes, uric acid level was last checked in 5/2021 and was 8.7.        Review of Systems   CONSTITUTIONAL: NEGATIVE for fever, chills, change in weight  RESP: NEGATIVE for significant cough or SOB  CV: NEGATIVE for chest pain, palpitations or peripheral edema  MUSCULOSKELETAL: see HPI      Objective    /78 (BP Location: Right arm, Patient Position:  "Sitting, Cuff Size: Adult Large)   Pulse 80   Temp 97.8  F (36.6  C) (Oral)   Resp 16   Ht 1.651 m (5' 5\")   Wt 87.5 kg (192 lb 12.8 oz)   SpO2 97%   Breastfeeding No   BMI 32.08 kg/m    Body mass index is 32.08 kg/m .  Physical Exam   GENERAL: healthy, alert and no distress  NECK: no adenopathy, no asymmetry, masses, or scars and thyroid normal to palpation  RESP: lungs clear to auscultation - no rales, rhonchi or wheezes  CV: regular rate and rhythm, normal S1 S2, no S3 or S4, no murmur, click or rub,   MS: left thumb, 2nd and 4th fingers with edema of DIP and PIP joint, tender to touch with slight redness.    NEURO; LUE with CMS intact        "

## 2022-03-05 LAB
ALBUMIN SERPL-MCNC: 3.5 G/DL (ref 3.4–5)
ALP SERPL-CCNC: 53 U/L (ref 40–150)
ALT SERPL W P-5'-P-CCNC: 27 U/L (ref 0–50)
ANION GAP SERPL CALCULATED.3IONS-SCNC: 9 MMOL/L (ref 3–14)
AST SERPL W P-5'-P-CCNC: 20 U/L (ref 0–45)
BILIRUB SERPL-MCNC: 0.5 MG/DL (ref 0.2–1.3)
BUN SERPL-MCNC: 13 MG/DL (ref 7–30)
CALCIUM SERPL-MCNC: 9.7 MG/DL (ref 8.5–10.1)
CHLORIDE BLD-SCNC: 106 MMOL/L (ref 94–109)
CO2 SERPL-SCNC: 23 MMOL/L (ref 20–32)
CREAT SERPL-MCNC: 0.81 MG/DL (ref 0.52–1.04)
GFR SERPL CREATININE-BSD FRML MDRD: 77 ML/MIN/1.73M2
GLUCOSE BLD-MCNC: 113 MG/DL (ref 70–99)
POTASSIUM BLD-SCNC: 3.7 MMOL/L (ref 3.4–5.3)
PROT SERPL-MCNC: 8.7 G/DL (ref 6.8–8.8)
SODIUM SERPL-SCNC: 138 MMOL/L (ref 133–144)
URATE SERPL-MCNC: 8.6 MG/DL (ref 2.6–6)

## 2022-03-14 ENCOUNTER — HOSPITAL ENCOUNTER (OUTPATIENT)
Facility: CLINIC | Age: 72
Setting detail: OBSERVATION
Discharge: HOME | End: 2022-03-15
Attending: EMERGENCY MEDICINE | Admitting: INTERNAL MEDICINE
Payer: COMMERCIAL

## 2022-03-14 ENCOUNTER — APPOINTMENT (OUTPATIENT)
Dept: GENERAL RADIOLOGY | Facility: CLINIC | Age: 72
End: 2022-03-14
Attending: EMERGENCY MEDICINE
Payer: COMMERCIAL

## 2022-03-14 DIAGNOSIS — R06.02 SHORTNESS OF BREATH: ICD-10-CM

## 2022-03-14 DIAGNOSIS — R07.9 CHEST PAIN, UNSPECIFIED TYPE: ICD-10-CM

## 2022-03-14 LAB
ANION GAP SERPL CALCULATED.3IONS-SCNC: 6 MMOL/L (ref 3–14)
BASOPHILS # BLD AUTO: 0 10E3/UL (ref 0–0.2)
BASOPHILS NFR BLD AUTO: 0 %
BUN SERPL-MCNC: 30 MG/DL (ref 7–30)
CALCIUM SERPL-MCNC: 9.3 MG/DL (ref 8.5–10.1)
CHLORIDE BLD-SCNC: 105 MMOL/L (ref 94–109)
CO2 SERPL-SCNC: 27 MMOL/L (ref 20–32)
CREAT SERPL-MCNC: 1.09 MG/DL (ref 0.52–1.04)
D DIMER PPP FEU-MCNC: 0.34 UG/ML FEU (ref 0–0.5)
EOSINOPHIL # BLD AUTO: 0 10E3/UL (ref 0–0.7)
EOSINOPHIL NFR BLD AUTO: 0 %
ERYTHROCYTE [DISTWIDTH] IN BLOOD BY AUTOMATED COUNT: 15.9 % (ref 10–15)
GFR SERPL CREATININE-BSD FRML MDRD: 54 ML/MIN/1.73M2
GLUCOSE BLD-MCNC: 101 MG/DL (ref 70–99)
HCT VFR BLD AUTO: 46.1 % (ref 35–47)
HGB BLD-MCNC: 14 G/DL (ref 11.7–15.7)
IMM GRANULOCYTES # BLD: 0.1 10E3/UL
IMM GRANULOCYTES NFR BLD: 1 %
LYMPHOCYTES # BLD AUTO: 2.1 10E3/UL (ref 0.8–5.3)
LYMPHOCYTES NFR BLD AUTO: 12 %
MCH RBC QN AUTO: 26.6 PG (ref 26.5–33)
MCHC RBC AUTO-ENTMCNC: 30.4 G/DL (ref 31.5–36.5)
MCV RBC AUTO: 88 FL (ref 78–100)
MONOCYTES # BLD AUTO: 1.3 10E3/UL (ref 0–1.3)
MONOCYTES NFR BLD AUTO: 8 %
NEUTROPHILS # BLD AUTO: 13.5 10E3/UL (ref 1.6–8.3)
NEUTROPHILS NFR BLD AUTO: 79 %
NRBC # BLD AUTO: 0 10E3/UL
NRBC BLD AUTO-RTO: 0 /100
NT-PROBNP SERPL-MCNC: 110 PG/ML (ref 0–900)
PLATELET # BLD AUTO: 425 10E3/UL (ref 150–450)
POTASSIUM BLD-SCNC: 4.2 MMOL/L (ref 3.4–5.3)
RBC # BLD AUTO: 5.26 10E6/UL (ref 3.8–5.2)
SODIUM SERPL-SCNC: 138 MMOL/L (ref 133–144)
TROPONIN I SERPL HS-MCNC: 41 NG/L
WBC # BLD AUTO: 17 10E3/UL (ref 4–11)

## 2022-03-14 PROCEDURE — 84484 ASSAY OF TROPONIN QUANT: CPT | Performed by: EMERGENCY MEDICINE

## 2022-03-14 PROCEDURE — 99285 EMERGENCY DEPT VISIT HI MDM: CPT | Mod: 25

## 2022-03-14 PROCEDURE — C9803 HOPD COVID-19 SPEC COLLECT: HCPCS

## 2022-03-14 PROCEDURE — 85379 FIBRIN DEGRADATION QUANT: CPT | Performed by: EMERGENCY MEDICINE

## 2022-03-14 PROCEDURE — 71046 X-RAY EXAM CHEST 2 VIEWS: CPT

## 2022-03-14 PROCEDURE — 80048 BASIC METABOLIC PNL TOTAL CA: CPT | Performed by: EMERGENCY MEDICINE

## 2022-03-14 PROCEDURE — 85025 COMPLETE CBC W/AUTO DIFF WBC: CPT | Performed by: EMERGENCY MEDICINE

## 2022-03-14 PROCEDURE — 36415 COLL VENOUS BLD VENIPUNCTURE: CPT | Performed by: EMERGENCY MEDICINE

## 2022-03-14 PROCEDURE — 83880 ASSAY OF NATRIURETIC PEPTIDE: CPT | Performed by: EMERGENCY MEDICINE

## 2022-03-14 PROCEDURE — 93005 ELECTROCARDIOGRAM TRACING: CPT

## 2022-03-14 RX ORDER — ASPIRIN 81 MG/1
324 TABLET, CHEWABLE ORAL ONCE
Status: DISCONTINUED | OUTPATIENT
Start: 2022-03-14 | End: 2022-03-15

## 2022-03-15 ENCOUNTER — APPOINTMENT (OUTPATIENT)
Dept: CARDIOLOGY | Facility: CLINIC | Age: 72
End: 2022-03-15
Attending: PHYSICIAN ASSISTANT
Payer: COMMERCIAL

## 2022-03-15 VITALS
WEIGHT: 197 LBS | SYSTOLIC BLOOD PRESSURE: 120 MMHG | HEART RATE: 63 BPM | TEMPERATURE: 98.4 F | HEIGHT: 65 IN | DIASTOLIC BLOOD PRESSURE: 66 MMHG | RESPIRATION RATE: 16 BRPM | OXYGEN SATURATION: 98 % | BODY MASS INDEX: 32.82 KG/M2

## 2022-03-15 LAB
ATRIAL RATE - MUSE: 82 BPM
DIASTOLIC BLOOD PRESSURE - MUSE: NORMAL MMHG
FLUAV RNA SPEC QL NAA+PROBE: NEGATIVE
FLUBV RNA RESP QL NAA+PROBE: NEGATIVE
INTERPRETATION ECG - MUSE: NORMAL
LVEF ECHO: NORMAL
P AXIS - MUSE: 58 DEGREES
PR INTERVAL - MUSE: 146 MS
QRS DURATION - MUSE: 82 MS
QT - MUSE: 382 MS
QTC - MUSE: 446 MS
R AXIS - MUSE: 16 DEGREES
SARS-COV-2 RNA RESP QL NAA+PROBE: NEGATIVE
SYSTOLIC BLOOD PRESSURE - MUSE: NORMAL MMHG
T AXIS - MUSE: 29 DEGREES
TROPONIN I SERPL HS-MCNC: 45 NG/L
TROPONIN I SERPL HS-MCNC: 55 NG/L
VENTRICULAR RATE- MUSE: 82 BPM

## 2022-03-15 PROCEDURE — 255N000002 HC RX 255 OP 636: Performed by: INTERNAL MEDICINE

## 2022-03-15 PROCEDURE — 99236 HOSP IP/OBS SAME DATE HI 85: CPT | Performed by: INTERNAL MEDICINE

## 2022-03-15 PROCEDURE — 36415 COLL VENOUS BLD VENIPUNCTURE: CPT | Performed by: INTERNAL MEDICINE

## 2022-03-15 PROCEDURE — 99207 PR APP CREDIT; MD BILLING SHARED VISIT: CPT | Performed by: PHYSICIAN ASSISTANT

## 2022-03-15 PROCEDURE — 87636 SARSCOV2 & INF A&B AMP PRB: CPT | Performed by: EMERGENCY MEDICINE

## 2022-03-15 PROCEDURE — 99207 PR CDG-CODE CATEGORY CHANGED: CPT | Performed by: INTERNAL MEDICINE

## 2022-03-15 PROCEDURE — 999N000208 ECHOCARDIOGRAM COMPLETE

## 2022-03-15 PROCEDURE — G0378 HOSPITAL OBSERVATION PER HR: HCPCS

## 2022-03-15 PROCEDURE — 84484 ASSAY OF TROPONIN QUANT: CPT | Performed by: INTERNAL MEDICINE

## 2022-03-15 PROCEDURE — 250N000013 HC RX MED GY IP 250 OP 250 PS 637: Performed by: INTERNAL MEDICINE

## 2022-03-15 PROCEDURE — 93306 TTE W/DOPPLER COMPLETE: CPT | Mod: 26 | Performed by: INTERNAL MEDICINE

## 2022-03-15 RX ORDER — FAMOTIDINE 20 MG/1
40 TABLET, FILM COATED ORAL 2 TIMES DAILY
Status: DISCONTINUED | OUTPATIENT
Start: 2022-03-15 | End: 2022-03-15 | Stop reason: HOSPADM

## 2022-03-15 RX ORDER — ASPIRIN 81 MG/1
162 TABLET, CHEWABLE ORAL ONCE
Status: COMPLETED | OUTPATIENT
Start: 2022-03-15 | End: 2022-03-15

## 2022-03-15 RX ORDER — CALCIUM CITRATE/VITAMIN D3 200MG-6.25
1 TABLET ORAL EVERY EVENING
COMMUNITY
End: 2023-10-04

## 2022-03-15 RX ORDER — NITROGLYCERIN 0.4 MG/1
0.4 TABLET SUBLINGUAL EVERY 5 MIN PRN
Status: DISCONTINUED | OUTPATIENT
Start: 2022-03-15 | End: 2022-03-15 | Stop reason: HOSPADM

## 2022-03-15 RX ORDER — MAGNESIUM HYDROXIDE/ALUMINUM HYDROXICE/SIMETHICONE 120; 1200; 1200 MG/30ML; MG/30ML; MG/30ML
30 SUSPENSION ORAL EVERY 4 HOURS PRN
Status: DISCONTINUED | OUTPATIENT
Start: 2022-03-15 | End: 2022-03-15 | Stop reason: HOSPADM

## 2022-03-15 RX ORDER — IBUPROFEN 200 MG
400 TABLET ORAL EVERY 6 HOURS PRN
COMMUNITY
End: 2022-08-15

## 2022-03-15 RX ORDER — TRIAMTERENE AND HYDROCHLOROTHIAZIDE 37.5; 25 MG/1; MG/1
1 CAPSULE ORAL EVERY EVENING
COMMUNITY
End: 2022-03-21

## 2022-03-15 RX ORDER — ASPIRIN 81 MG/1
81 TABLET ORAL DAILY
Status: DISCONTINUED | OUTPATIENT
Start: 2022-03-16 | End: 2022-03-15

## 2022-03-15 RX ADMIN — ASPIRIN 81 MG CHEWABLE TABLET 162 MG: 81 TABLET CHEWABLE at 02:53

## 2022-03-15 RX ADMIN — HUMAN ALBUMIN MICROSPHERES AND PERFLUTREN 3 ML: 10; .22 INJECTION, SOLUTION INTRAVENOUS at 12:01

## 2022-03-15 NOTE — PHARMACY-ADMISSION MEDICATION HISTORY
Admission medication history interview status for this patient is complete. See [facility] admission navigator for allergy information, prior to admission medications and immunization status.     Medication history interview done, indicate source(s): Patient  Medication history resources (including written lists, pill bottles, clinic record): SureScripts and Care Everywhere  Pharmacy: [facility]. [facility] for discharge    Changes made to PTA medication list:  Added: ibuprofen and MVI  Changed: atenolol daily --> QPM. Maxzide daily --> QPM   Reported as Not Taking: -  Removed: -    Actions taken by pharmacist (provider contacted, etc): Spoke with patient about home med list     Additional medication history information: Patient has 1 dose of prednisone 10mg daily remaining - to be taken today, 3/15/22.    Medication reconciliation/reorder completed by provider prior to medication history?  N   (Y/N)     Prior to Admission medications    Medication Sig Last Dose Taking? Auth Provider   atenolol (TENORMIN) 25 MG tablet TAKE 1 TABLET BY MOUTH  DAILY  Patient taking differently: Take 25 mg by mouth every evening  3/14/2022 at 1900 Yes Coming Salud Donovan MD   ibuprofen (ADVIL/MOTRIN) 200 MG tablet Take 400 mg by mouth every 6 hours as needed for mild pain More than a month at Unknown time Yes Unknown, Entered By History   Multiple Vitamins-Minerals (MULTIVITAMIN GUMMIES WOMENS) CHEW Take 1 chew tab by mouth every evening 3/14/2022 at PM Yes Unknown, Entered By History   predniSONE (DELTASONE) 20 MG tablet Take 3 tabs by mouth daily x 3 days, then 2 tabs daily x 3 days, then 1 tab daily x 3 days, then 1/2 tab daily x 3 days. 3/14/2022 at AM Yes Haase, Susan Rachele, APRN CNP   triamterene-HCTZ (DYAZIDE) 37.5-25 MG capsule Take 1 capsule by mouth every evening 3/14/2022 at 1900 Yes Unknown, Entered By History

## 2022-03-15 NOTE — DISCHARGE SUMMARY
[facility]  Discharge Summary  Hospitalist    Date of Admission:  3/14/2022  Date of Discharge:  3/15/2022    Discharging Provider: Frida Campoverde PAC    Discharge Diagnoses   1. Atypical chest pain.  Work-up largely negative.  Referred for outpatient stress test.   2. Suspected prednisone-related gastritis  3. HTN  4. Gout  5. DLD    Discharge Disposition     Discharged to home    Condition at Discharge:  Stable    History of Present Illness   This is a 71-year-old white female with a history of hypertension, gout, who presented with chest discomfort and shortness of breath, feeling anxious.  This had been going on for a couple of days; however, it was more pronounced after eating.  Chest discomfort migrated to the left lower side.  Patient recently placed on steroid pack for gout flare.  Since starting steroids, she has noticed increased belching and an urge to burp.  Dyspnea is occurring off and on.  Evaluation in the ER revealed stable vitals, normal EKG, normal DDimer, and a very slight troponin rise from 41 >> 55 (normal is >/= 54).  Patient brought in for chest pain rule out.     Overnight, patient did well.  Symptoms are nearly resolved but she is still having some mild discomfort/pulsations under her left breast.  Trop trended down to 45.  While patient does not herself have a cardiac history, she continues to worry about what happened to her 1st cousin who  suddenly with her only preceeding symptom being shortness of breath.  (She notes she never heard about the autopsy results so isn't sure if her cousin had a heart attack.)  Unfortunately, due to multiple patient being admitted overnight for stress tests, Cardiopulmonary could not accommodate a Nuc Med Lexiscan or Treadmill test and Echo could not accommodate a Stress Echo.      Discussed options with patient which included 1) staying overnight for stress test tomorrow, 2) obtaining resting TTE and if normal discharging with  outpatient stress test, or 3) discharging with outpatient stress test and/or Echo.  Patient elected to have resting TTE today and if normal, discharge home with an outpatient stress test.      Echocardiogram completed and is within normal limits.  Patient will discharge home with outpatient NM Treadmill stress test and PMD follow-up.  Should symptoms recur, she will return for expedited stress test and further evaluation.      ALMA Martinez    Code Status   Full Code       Primary Care Physician   Salud Blanchard Corning    Consultations This Hospital Stay   None    Time Spent on this Encounter   I, ALMA Martinez, personally saw the patient today and spent greater than 30 minutes discharging this patient.    Allergies   No Known Allergies    Physical Exam   Temp: 98.4  F (36.9  C) Temp src: Oral BP: 120/66 Pulse: 63   Resp: 16 SpO2: 98 % O2 Device: None (Room air)    Vitals:    03/15/22 0243   Weight: 89.4 kg (197 lb)     Vital Signs with Ranges  Temp:  [97.6  F (36.4  C)-98.4  F (36.9  C)] 98.4  F (36.9  C)  Pulse:  [50-95] 63  Resp:  [14-24] 16  BP: (105-186)/(56-81) 120/66  Cuff Mean (mmHg):  [95] 95  SpO2:  [98 %-100 %] 98 %  No intake/output data recorded.      GENERAL:  Pleasant, cooperative, alert. Well developed, well nourished.   HEENT: Normocephalic, atraumatic.  Extra occular mm intact.  Sclera clear. PERRL.  Mucous membranes moist.     PULMONOLOGY: Clear    CARDIAC: Regular rate and rhythm.  No appreciated murmur.     ABDOMEN: Soft, nontender    MUSCULOSKELETAL:  Moving x 4 spontaneously with CMS intact x4.  Normal bulk and tone.    NEURO: Alert and oriented x3.  CN II-XII grossly intact and symmetric.  Nonfocal.    Discharge Medications   Current Discharge Medication List      CONTINUE these medications which have NOT CHANGED    Details   atenolol (TENORMIN) 25 MG tablet TAKE 1 TABLET BY MOUTH  DAILY  Qty: 90 tablet, Refills: 3    Comments: Requesting 1 year supply  Associated Diagnoses:  Benign essential hypertension      ibuprofen (ADVIL/MOTRIN) 200 MG tablet Take 400 mg by mouth every 6 hours as needed for mild pain      Multiple Vitamins-Minerals (MULTIVITAMIN GUMMIES WOMENS) CHEW Take 1 chew tab by mouth every evening      predniSONE (DELTASONE) 20 MG tablet Take 3 tabs by mouth daily x 3 days, then 2 tabs daily x 3 days, then 1 tab daily x 3 days, then 1/2 tab daily x 3 days.  Qty: 20 tablet, Refills: 0    Associated Diagnoses: Acute idiopathic gout of left hand      triamterene-HCTZ (DYAZIDE) 37.5-25 MG capsule Take 1 capsule by mouth every evening             Data   Most Recent 3 CBC's:  Recent Labs   Lab Test 03/14/22  2309 03/04/22  1048 03/08/21  1030   WBC 17.0* 10.2 6.7   HGB 14.0 12.6 13.2   MCV 88 86 85    356 381      Most Recent 3 BMP's:  Recent Labs   Lab Test 03/14/22  2309 03/04/22  1048 05/19/21  1630    138 134   POTASSIUM 4.2 3.7 4.1   CHLORIDE 105 106 102   CO2 27 23 27   BUN 30 13 22   CR 1.09* 0.81 0.92   ANIONGAP 6 9 5   BLANCA 9.3 9.7 9.1   * 113* 94     Most Recent 2 LFT's:  Recent Labs   Lab Test 03/04/22  1048 05/19/21  1630   AST 20 20   ALT 27 28   ALKPHOS 53 59   BILITOTAL 0.5 0.5     Most Recent INR's and Anticoagulation Dosing History:  Anticoagulation Dose History     Recent Dosing and Labs Latest Ref Rng & Units 3/14/2011 3/16/2011 3/17/2011 3/18/2011    INR 0.86 - 1.14 0.99 1.17(H) 1.70(H) 1.36(H)        Most Recent 3 Troponin's:No lab results found.  Most Recent Cholesterol Panel:  Recent Labs   Lab Test 03/08/21  1030   CHOL 181   *   HDL 41*   TRIG 156*     Most Recent 6 Bacteria Isolates From Any Culture (See EPIC Reports for Culture Details):No lab results found.  Most Recent TSH, T4 and A1c Labs:  Recent Labs   Lab Test 03/08/21  1030   TSH 1.67   A1C 5.4     Results for orders placed or performed during the hospital encounter of 03/14/22   Chest XR,  PA & LAT    Narrative    EXAM: XR CHEST 2 VW  LOCATION: [facility]  HOSPITAL  DATE/TIME: 3/14/2022 11:59 PM    INDICATION: Chest pain.  COMPARISON: None.    FINDINGS: The heart size is normal. The lungs are clear. No pneumothorax or pleural effusion. Degenerative disease in the spine.      Impression    IMPRESSION: No acute abnormality.   Echocardiogram Complete     Value    LVEF  65-70%    [facility]    [id number]  [id number]  [id number]  [id number]^FRANCISCA^CARTER^MARIA DOLORES     [facility]  Echocardiography Laboratory  [address] [place] [address]     Name: BAM MERCER  MRN: [id number]  : 1950  Study Date: 03/15/2022 11:40 AM  Age: 71 yrs  Gender: Female  Patient Location: [facility]  Reason For Study: Chest Pain, Dyspnea  Ordering Physician: CARTER ESPINOSA  Performed By: Abby Boss     BSA: 1.9 m2  Height: 65 in  Weight: 188 lb  BP: 120/66 mmHg  ______________________________________________________________________________  Procedure  Complete Portable Echo Adult. Optison (NDC #[id number]) given intravenously.  ______________________________________________________________________________  Interpretation Summary     Essentially normal adult cardiac echo.  ______________________________________________________________________________  Left Ventricle  The left ventricle is normal in size. There is normal left ventricular wall  thickness. The visual ejection fraction is 65-70%. Diastolic Doppler findings  (E/E' ratio and/or other parameters) suggest left ventricular filling  pressures are normal. No regional wall motion abnormalities noted.     Right Ventricle  The right ventricle is normal in structure, function and size.     Atria  Normal left atrial size. Right atrial size is normal. There is no atrial shunt  seen.     Mitral Valve  There is mild mitral annular calcification. Thickened mitral valve anterior  leaflet. There is trace mitral regurgitation.     Tricuspid Valve  The tricuspid valve is normal in structure and function. IVC diameter <2.1  cm  collapsing >50% with sniff suggests a normal RA pressure of 3 mmHg. There is  trace tricuspid regurgitation. Right ventricular systolic pressure could not  be approximated due to inadequate tricuspid regurgitation.     Aortic Valve  There is mild trileaflet aortic sclerosis. No aortic regurgitation is present.  No aortic stenosis is present.     Pulmonic Valve  There is trace pulmonic valvular regurgitation.     Vessels  Normal size aorta.     Pericardium  There is no pericardial effusion.     Rhythm  The rhythm was sinus bradycardia.  ______________________________________________________________________________  MMode/2D Measurements & Calculations  IVSd: 1.1 cm  LVIDd: 4.5 cm  LVIDs: 2.8 cm  LVPWd: 0.65 cm  FS: 37.8 %  LV mass(C)d: 127.3 grams  LV mass(C)dI: 66.1 grams/m2  Ao root diam: 2.7 cm  asc Aorta Diam: 3.1 cm  LVOT diam: 2.2 cm  LVOT area: 3.8 cm2  LA Volume (BP): 55.3 ml  LA Volume Index (BP): 28.7 ml/m2     RWT: 0.29     Doppler Measurements & Calculations  MV E max boyd: 62.6 cm/sec  MV A max boyd: 57.0 cm/sec  MV E/A: 1.1  MV dec time: 0.22 sec  PA acc time: 0.11 sec  E/E' av.0  Lateral E/e': 6.0  Medial E/e': 8.0     ______________________________________________________________________________  Report approved by: Marion Quigley 03/15/2022 12:21 PM               Discharge Orders      Reason for your hospital stay    You were brought in for evaluation of a very mildly elevated cardiac enzyme that occurred in the setting of chest discomfort and shortness of breath.  Echocardiogram, vitals, normalization of the enzyme, and resolution of symptoms are reassuring.  It could be that your symptoms are related to prednisone use causing gastritis and heartburn.  You have been referred for an outpatient stress test.  Please follow-up with your primary provider in 7-10 days or sooner if needed.     When to contact your care team    Call your primary doctor if you have any of the following:  temperature greater than 101, worsening shortness of breath, increased swelling, worsening or uncontrolled pain, new or unrelenting diarrhea, dizziness/passing out, falls, bleeding that doesn't stop, loss of taste, loss of smell, or any other new or concerning symptoms.  Call 911 or go to the Emergency Room if you need immediate assistance.     NM Exercise stress test (nuc card)    Please send a copy of stress test results to patient's primary care.

## 2022-03-15 NOTE — PLAN OF CARE
Goal Outcome Evaluation:     Pt up ad camryn. Echo completed. Pt pain and SOB has resolved and feels good. Will follow up with primary MD as outpatient to schedule a stress test. Awaiting ride from daughter and will go out to car in wheelchair.  OBSERVATION patient END time: 1330

## 2022-03-15 NOTE — PLAN OF CARE
PRIMARY DIAGNOSIS: CHEST PAIN  OUTPATIENT/OBSERVATION GOALS TO BE MET BEFORE DISCHARGE:  1. Ruled out acute coronary syndrome (negative or stable Troponin):  Yes  2. Pain Status: Pain free.  3. Appropriate provocative testing performed: Yes  - Stress Test Procedure: Regular  - Interpretation of cardiac rhythm per telemetry tech: SR    4. Cleared by Consultants (if applicable):N/A  5. Return to near baseline physical activity: Yes  Discharge Planner Nurse   Safe discharge environment identified: Yes  Barriers to discharge: Yes awaiting stress test       Entered by: Libby Rock 03/15/2022 7:43 AM     Please review provider order for any additional goals.   Nurse to notify provider when observation goals have been met and patient is ready for discharge.

## 2022-03-15 NOTE — H&P
Admitted: 03/14/2022    CHIEF COMPLAINT:  Shortness of breath, anxiety, discomfort in the chest.    HISTORY OF PRESENT ILLNESS:  This is a 71-year-old white female with a history of hypertension, gout, who presents with chest discomfort and shortness of breath, feeling anxious.  This has been going on for a couple of days; however, it was more pronounced today after eating.  She has had this discomfort in her chest, which is kind of migratory on the left side.  She kind of felt the urge to burp, however, did not really get it.  Her shortness of breath that has been on and off, accompanied with her anxiety.  She has had difficulty sleeping.  Hence she came into the ER for further evaluation.  Of note, the patient recently was diagnosed with gout of her left upper extremity and was put on prednisone.  She is on the tapering end of her prednisone.  In the ER over here, she is seen by Dr. Dena.  I discussed care with him.  I am asked to admit her for further evaluation.    PAST MEDICAL HISTORY:  Significant for gout, hyperlipidemia, hypertension, vitiligo.    PAST SURGICAL HISTORY:  Significant for right total hip arthroplasty.    FAMILY HISTORY:  Significant for hypertension in her mother.  There is no cardiac history otherwise.    HOME MEDICATIONS:  List includes:   1.  Atenolol.  2.  Prednisone taper.  3.  Hydrochlorothiazide.    ALLERGIES:  NO KNOWN DRUG ALLERGIES.    REVIEW OF SYSTEMS:  As mentioned in the HPI, she denies any chest pain at present.  All other systems are reviewed and deemed unremarkable and negative.    PHYSICAL EXAMINATION:    VITAL SIGNS:  Her temperature is 97.6, pulse is 95, blood pressure is 109/59, respiratory rate 18, oxygen saturation is 98% on room air.  GENERAL:  She is alert, awake, oriented, coherent, nontoxic, in no acute distress.  HEENT:  Pupils equal, round, reactive to light.  LUNGS:  Clear to auscultation bilaterally.  HEART:  Regular rate.  S1, S2 normal.  No murmurs or  gallops.  ABDOMEN:  Soft, nontender, nondistended with good bowel sounds.  EXTREMITIES:  There is no edema.  SKIN:  There is no rash.   NEUROLOGICAL:  She moves all extremities.    LABORATORY DATA:  Obtained today included a basic metabolic panel which is grossly unremarkable other than creatinine 1.09 and a GFR of 54.  Her BNP is 110 and her troponin is 41.  On a CBC, her white cell count is 17.0 with a hemoglobin 14, hematocrit 46.1, platelet count of 425.  Absolute neutrophil count of 13.5.  Her D-dimer is 0.3.  Influenza and SARS COVID test was read as negative.  A chest x-ray, 2 views:  showed no acute abnormality.  An EKG shows normal sinus rhythm at 82 beats per minute.    ASSESSMENT AND PLAN:    1.  Chest pain, shortness of breath.  I think her symptoms are likely as a result of anxiety.  I do not think there is any acute coronary syndrome present.  It may be related to prednisone.  We will admit her under observation status.  We will keep her on telemetry.  We will carry out serial troponins on her as well as a stress test on her.  2.  Hypertension, hyperlipidemia.  She will need resumption of her home medications.  3.  Gout.  Seems to be improving.  I will hold off on the prednisone for now.    CODE STATUS:  Full code.    She will be admitted under observation status.    Leila Juarez MD        D: 03/15/2022   T: 03/15/2022   MT: SPMT    Name:     BAM MERCER  MRN:      [id number]        Account:     [id number]   :      1950           Admitted:    2022       Document: [id number]    cc:  Salud Donovan MD

## 2022-03-15 NOTE — CARE PLAN
ROOM # 226    Living Situ  ation (if not independent, order SW consult):  Facility name: Home  : Self    Activity level at baseline: Independent  Activity level on admit: SBA    Who will be transporting you at discharge: N/A TBD    Patient registered to observation; given Patient Bill of Rights; given the opportunity to ask questions about observation status and their plan of care.  Patient has been oriented to the observation room, bathroom and call light is in place.    Discussed discharge goals and expectations with patient/family.

## 2022-03-15 NOTE — CARE PLAN
PRIMARY DIAGNOSIS: CHEST PAIN  OUTPATIENT/OBSERVATION GOALS TO BE MET BEFORE DISCHARGE:  1. Ruled out acute coronary syndrome ( Troponin):  55  2. Pain Status: Pain free.  3. Appropriate provocative testing performed: Yes  - Stress Test Procedure: This morning  - Interpretation of cardiac rhythm per telemetry tech: Sinus Demond    4. Cleared by Consultants (if applicable):No  5. Return to near baseline physical activity: Yes  Discharge Planner Nurse   Safe discharge environment identified: Yes  Barriers to discharge: Yes       Entered by: Pierce Ybarra 03/15/2022 3:12 AM     Please review provider order for any additional goals.   Nurse to notify provider when observation goals have been met and patient is ready for discharge.

## 2022-03-15 NOTE — PLAN OF CARE
PRIMARY DIAGNOSIS: CHEST PAIN  OUTPATIENT/OBSERVATION GOALS TO BE MET BEFORE DISCHARGE:  1. Ruled out acute coronary syndrome (negative or stable Troponin):  Yes  2. Pain Status: Pain free.  3. Appropriate provocative testing performed: Yes  - Stress Test Procedure: Regular n/a Echo  - Interpretation of cardiac rhythm per telemetry tech: SR    4. Cleared by Consultants (if applicable):N/A  5. Return to near baseline physical activity: Yes  Discharge Planner Nurse   Safe discharge environment identified: Yes  Barriers to discharge: Yes echo then discharge if looks normal.       Entered by: Libby Rock 03/15/2022 12:08 PM     Please review provider order for any additional goals.   Nurse to notify provider when observation goals have been met and patient is ready for discharge.Goal Outcome Evaluation:

## 2022-03-15 NOTE — CARE PLAN
"PRIMARY DIAGNOSIS: \"Shortnss of breath & chest pain\" NURSING  OUTPATIENT/OBSERVATION GOALS TO BE MET BEFORE DISCHARGE:  1. ADLs back to baseline: Yes    2. Activity and level of assistance: Up with standby assistance.    3. Pain status: Pain free.    4. Return to near baseline physical activity: Yes     Discharge Planner Nurse   Safe discharge environment identified: Yes  Barriers to discharge: No       Entered by: Pierce Ybarra 03/15/2022 3:09 AM     Please review provider order for any additional goals.   Nurse to notify provider when observation goals have been met and patient is ready for discharge.  "

## 2022-03-15 NOTE — ED PROVIDER NOTES
History     Chief Complaint:  Shortness of Breath and Chest Pain     HPI:  Ailyn Beck is a 71 year old female who presents with shortness of breath and chest pain.  History is obtained from the patient.  Patient reports she first began feeling unwell over the weekend (2 days previous).  Since that time, she has described intermittent migrating chest discomfort, sometimes on the right side, sometimes on the left side, and symptoms on her left breast.  This typically lasts approximately 30 minutes before resolution.  This evening, she notes that she began developing increased shortness of breath, particularly with exertion.  This prompted concern and her decision to present to the ED for further evaluation.  She also noted some discomfort to her neck/jaw.  She has not experienced pain similar to this in the past.  She has no prior diagnosis of CAD or prior stent placement.  She does acknowledge a history of hypertension and hyperlipidemia.  Of note, patient is completing a 10-day course of prednisone for gout.  Her gout symptoms involved her left hand, and do appear to be improving.  She has no prior history of DVT nor PE.    Allergies:  No Known Allergies     Medications:    Atenolol  Maxzide-25  Prednisone     Past Medical History:    Elevated fasting glucose   Gout   Hyperlipidemia   Hypertension   Vitiligo   CKD, stage III     Past Surgical History:    Total hip arthroplasty, right  Elbow surgery, right    Family History:    Father: emphysema, heart disease   Mother: hypertension, heart disease   Brother: prostate cancer, hypertension   Sister: breast cancer    Social History:   reports that she has never smoked. She has never used smokeless tobacco. She reports previous alcohol use. She reports that she does not use drugs.    Review of Systems:  10 point ROS is negative aside from that mentioned in the HPI      Physical Exam     Patient Vitals for the past 24 hrs:   BP Temp Temp src Pulse Resp SpO2    03/15/22 0030 109/59 -- -- -- 18 98 %   03/14/22 2329 105/66 -- -- -- 24 100 %   03/14/22 2120 (!) 186/81 97.6  F (36.4  C) Oral 95 20 98 %      General:   Well-nourished   Speaking in full sentences  Eyes:   Conjunctiva without injection or scleral icterus  ENT:   Moist mucous membranes   Nares patent   Pinnae normal  Neck:   Full ROM   No stiffness appreciated  Resp:   Lungs CTAB   No crackles, wheezing or audible rubs   Good air movement  CV:    Normal rate, regular rhythm   S1 and S2 present   No murmur, gallop or rub  GI:   BS present   Abdomen soft without distention   Non-tender to light and deep palpation   No guarding or rebound tenderness  Skin:   Warm, dry, well perfused   No rashes or open wounds on exposed skin  MSK:   Moves all extremities   No focal deformities or swelling  Neuro:   Alert   Answers questions appropriately   Moves all extremities equally   Gait stable  Psych:   Normal affect, normal mood        Emergency Department Course     ECG  ECG taken at 2143  Normal sinus rhythm  Right atrial enlargement  Borderline ECG  Rate 82 bpm. WV interval 146 ms. QRS duration 82 ms. QT/QTc 382/446 ms. P-R-T axes 58 16 29.    Interpreted at 2309 by Devyn Dean MD.     Imaging:  Radiology findings were communicated with the patient who voiced understanding of the findings.  Chest XR,  PA & LAT   Final Result   IMPRESSION: No acute abnormality.           Laboratory:  Laboratory findings were communicated with the patient who voiced understanding of the findings.  Labs Ordered and Resulted from Time of ED Arrival to Time of ED Departure   BASIC METABOLIC PANEL - Abnormal       Result Value    Sodium 138      Potassium 4.2      Chloride 105      Carbon Dioxide (CO2) 27      Anion Gap 6      Urea Nitrogen 30      Creatinine 1.09 (*)     Calcium 9.3      Glucose 101 (*)     GFR Estimate 54 (*)    CBC WITH PLATELETS AND DIFFERENTIAL - Abnormal    WBC Count 17.0 (*)     RBC Count 5.26 (*)     Hemoglobin  14.0      Hematocrit 46.1      MCV 88      MCH 26.6      MCHC 30.4 (*)     RDW 15.9 (*)     Platelet Count 425      % Neutrophils 79      % Lymphocytes 12      % Monocytes 8      % Eosinophils 0      % Basophils 0      % Immature Granulocytes 1      NRBCs per 100 WBC 0      Absolute Neutrophils 13.5 (*)     Absolute Lymphocytes 2.1      Absolute Monocytes 1.3      Absolute Eosinophils 0.0      Absolute Basophils 0.0      Absolute Immature Granulocytes 0.1      Absolute NRBCs 0.0     TROPONIN I - Normal    Troponin I High Sensitivity 41     NT PROBNP INPATIENT - Normal    N terminal Pro BNP Inpatient 110     D DIMER QUANTITATIVE - Normal    D-Dimer Quantitative 0.34     INFLUENZA A/B & SARS-COV2 PCR MULTIPLEX - Normal    Influenza A PCR Negative      Influenza B PCR Negative      SARS CoV2 PCR Negative     INFLUENZA A/B & SARS-COV2 PCR MULTIPLEX       Interventions:  Medications   aspirin (ASA) chewable tablet 324 mg (0 mg Oral Hold 3/14/22 2026)        Emergency Department Course / Reassessment:  Nursing notes and vitals reviewed.  10:58 PM: I performed an exam of the patient as documented above.      The patient was sent for X-ray while in the emergency department, results above.            I discussed the treatment plan with the patient.  They are in agreement with hospitalization at this time.  Case was discussed with Dr. Juarez, who has graciously accepted the patient for admission to observation.  Questions were answered prior to transfer of care.      HEART Score  Background  Calculates the overall risk of adverse event in patient's presenting with chest pain.  Based on 5 criteria (each assigned 0-2 points) including suspiciousness of history, EKG, age, risk factors and troponin.    Data  71 year old female  has Elevated fasting glucose; Essential hypertension; Gout; HLD (hyperlipidemia); Vitiligo; and Stage 3a chronic kidney disease (H) on their problem list.   reports that she has never smoked. She has never  used smokeless tobacco.  family history includes Breast Cancer in her sister; Emphysema in her father; Hypertension in her mother; Prostate Cancer in her brother.  No results found for: TROPI  Criteria   0-2 points for each of 5 items (maximum of 10 points):  Score 1- History moderately suspicious for coronary syndrome  Score 1- EKG with Non-specific repolarization disturbance  Score 2- Age 65 years or older  Score 1- One to 2 risk factors for atherosclerotic disease  Score 0- Within normal limits for troponin levels  Interpretation  Risk of adverse outcome  Heart Score: 5  Total Score 4-6- Adverse Outcome Risk 20.3% - Supports admission with standard rule-out management -serial troponins and stress testing        Impression & Plan      Medical Decision Making:  Ailyn Beck is a 71 year old female who presents to the ER for evaluation of chest pain and shortness of breath.  Vital signs on presentation reveal elevated BP, which improved during patient's ED course.  DDx includes ACS, PE, pneumothorax, pneumonia, aortic dissection, medication side effect, viral syndrome, among others.  History, exam, and ED course as outlined above.  Patient presents with chest pain, and dyspnea particularly with exertion noted over the past few days.  Cardiac etiologies on the differential, and strongly considered.  EKG demonstrates sinus rhythm, right atrial enlargement, though no evidence of ST segment elevation or depression.  Her initial troponin has returned detectable yet within normal range.  HEART score 5, correlating with moderate risk, and supporting hospital observation.  Here in the ED, she remains chest pain-free, and is without shortness of breath at rest.  Pulmonary embolism considered though unlikely as patient is low risk by Wells criteria, and D-dimer returned within normal limits.  Chest x-ray negative for pneumothorax or pneumonia.  History and evaluation not suggestive of aortic dissection.  Viral syndrome  considered though rapid influenza/Covid testing have returned negative.  Labs do reveal leukocytosis, which may be in part related to recent prednisone taper.  I suspect prednisone may also be contributing to patient's feelings of jitteriness/possible fatigue.  Results and clinical impression discussed with patient.  We will plan for hospital observation for further monitoring and treatment.  Case discussed with Dr. Juarez.    Diagnosis:    ICD-10-CM    1. Chest pain, unspecified type  R07.9    2. Shortness of breath  R06.02       3/14/2022   Devyn Dean MD Roach, Brian Donald, MD  03/15/22 0232

## 2022-03-15 NOTE — ED TRIAGE NOTES
"Pt arrives to ED with a few days of feeling unwell. Pt states she is having \"chest discomfort\" that moves from the L to R side of her chest and SOB with movement. Pt states pain and SOB got worse after dinner. Pt states she feels like she \"has to burp\". Vaxxed for covid, not flu.   "

## 2022-03-15 NOTE — PROGRESS NOTES
Care Management Discharge Note    Discharge Date: 03/15/2022     Discharge Disposition:  Home    Handoff Referral Completed: Yes    Additional Information:  Pt identified as a Service Bundle #2. No needs or assessment needed at this time. Please consult CM/SW  if discharge needs should arise.    MAXWELL Durán, [facility]   Inpatient Care Coordination  [facility]   [phone]

## 2022-03-15 NOTE — PROGRESS NOTES
INTERIM NOTE    Lexiscan ordered but NM unable to accommodate due to full schedule.  Also cannot accommodate NM Treadmill.  Echo unable to accommodate Stress Echo due to full schedule.  Discussed options with patient.    Dyspnea has resolved completely.  Vitals stable.  Trops peaked at 55 (normal is </=54) before trending to 45.  Patient continues to notice some mild discomfort or pressure below left breast.  She has also noticed some reflux-like symptoms of belching and chest burning since starting a prednisone pack for a gout flare.  She is not on pepcid or PPI.  Tried Rolaids but they didn't really help.  No cardiac history.  Mainly worried because her 1st cousin  suddenly this fall and her only preceeding symptom was shortness of breath.  (She doesn't know the exact reason her cousin .)    Discussed options with patient which include 1) staying overnight for stress test tomorrow, 2) obtaining resting TTE and if normal discharging with outpatient stress test, or 3) discharging with outpatient stress test and/or Echo.  Patient elected to have resting TTE today and if normal, outpatient stress test.  Also discussed trial of Pepcid or PPI which she will think about.  Recommended forgoing the last dose of steroids (only one 1/2 tab left).  Offered to start allopurinol but patient declined; she plans to talk to her PMD about this at a previously scheduled appointment later this month.     OK to eat.  Echo ordered.    [facility]

## 2022-03-15 NOTE — ED NOTES
[facility]  ED Nurse Handoff Report    Ailyn Beck is a 71 year old female   ED Chief complaint: Shortness of Breath and Chest Pain  . ED Diagnosis:   Final diagnoses:   Chest pain, unspecified type   Shortness of breath     Allergies: No Known Allergies    Code Status: Full Code  Activity level - Baseline/Home:  Independent. Activity Level - Current:   Stand by Assist. Lift room needed: No. Bariatric: No   Needed: No   Isolation: No. Infection: Not Applicable.     Vital Signs:   Vitals:    03/14/22 2120 03/14/22 2329 03/15/22 0030   BP: (!) 186/81 105/66 109/59   Pulse: 95     Resp: 20 24 18   Temp: 97.6  F (36.4  C)     TempSrc: Oral     SpO2: 98% 100% 98%       Cardiac Rhythm:  ,   Cardiac  Cardiac Rhythm: Normal sinus rhythm  Pain level:    Patient confused: No. Patient Falls Risk: No.   Elimination Status: Has voided   Patient Report - Initial Complaint: SOB.Focused Assessment: SOB and chest pain, lung sounds clear   Tests Performed:   Labs Ordered and Resulted from Time of ED Arrival to Time of ED Departure   BASIC METABOLIC PANEL - Abnormal       Result Value    Sodium 138      Potassium 4.2      Chloride 105      Carbon Dioxide (CO2) 27      Anion Gap 6      Urea Nitrogen 30      Creatinine 1.09 (*)     Calcium 9.3      Glucose 101 (*)     GFR Estimate 54 (*)    CBC WITH PLATELETS AND DIFFERENTIAL - Abnormal    WBC Count 17.0 (*)     RBC Count 5.26 (*)     Hemoglobin 14.0      Hematocrit 46.1      MCV 88      MCH 26.6      MCHC 30.4 (*)     RDW 15.9 (*)     Platelet Count 425      % Neutrophils 79      % Lymphocytes 12      % Monocytes 8      % Eosinophils 0      % Basophils 0      % Immature Granulocytes 1      NRBCs per 100 WBC 0      Absolute Neutrophils 13.5 (*)     Absolute Lymphocytes 2.1      Absolute Monocytes 1.3      Absolute Eosinophils 0.0      Absolute Basophils 0.0      Absolute Immature Granulocytes 0.1      Absolute NRBCs 0.0     TROPONIN I - Normal    Troponin I  High Sensitivity 41     NT PROBNP INPATIENT - Normal    N terminal Pro BNP Inpatient 110     D DIMER QUANTITATIVE - Normal    D-Dimer Quantitative 0.34     INFLUENZA A/B & SARS-COV2 PCR MULTIPLEX - Normal    Influenza A PCR Negative      Influenza B PCR Negative      SARS CoV2 PCR Negative     INFLUENZA A/B & SARS-COV2 PCR MULTIPLEX     Abnormal Results:   Chest XR,  PA & LAT   Final Result   IMPRESSION: No acute abnormality.           Treatments provided: ASA  Family Comments: none  OBS brochure/video discussed/provided to patient:  yes  ED Medications:   Medications   aspirin (ASA) chewable tablet 324 mg (0 mg Oral Hold 3/14/22 2386)     Drips infusing:  No  For the majority of the shift, the patient's behavior Green. Interventions performed were NA.    Sepsis treatment initiated: No     Patient tested for COVID 19 prior to admission: YES    ED Nurse Name/Phone Number: Mame Toney RN,   2:04 AM  RECEIVING UNIT ED HANDOFF REVIEW    Above ED Nurse Handoff Report was reviewed: Yes  Reviewed by: Pierce Ybarra RN on March 15, 2022 at 2:23 AM

## 2022-03-21 ENCOUNTER — OFFICE VISIT (OUTPATIENT)
Dept: FAMILY MEDICINE | Facility: CLINIC | Age: 72
End: 2022-03-21
Payer: COMMERCIAL

## 2022-03-21 VITALS
HEIGHT: 65 IN | HEART RATE: 87 BPM | SYSTOLIC BLOOD PRESSURE: 131 MMHG | TEMPERATURE: 97.7 F | BODY MASS INDEX: 32.32 KG/M2 | OXYGEN SATURATION: 97 % | WEIGHT: 194 LBS | DIASTOLIC BLOOD PRESSURE: 75 MMHG

## 2022-03-21 DIAGNOSIS — I10 BENIGN ESSENTIAL HYPERTENSION: ICD-10-CM

## 2022-03-21 DIAGNOSIS — Z00.00 ENCOUNTER FOR MEDICARE ANNUAL WELLNESS EXAM: Primary | ICD-10-CM

## 2022-03-21 DIAGNOSIS — Z12.31 VISIT FOR SCREENING MAMMOGRAM: ICD-10-CM

## 2022-03-21 DIAGNOSIS — N18.31 STAGE 3A CHRONIC KIDNEY DISEASE (H): ICD-10-CM

## 2022-03-21 DIAGNOSIS — E78.5 HYPERLIPIDEMIA, UNSPECIFIED HYPERLIPIDEMIA TYPE: ICD-10-CM

## 2022-03-21 DIAGNOSIS — M1A.09X0 CHRONIC GOUT OF MULTIPLE SITES, UNSPECIFIED CAUSE: ICD-10-CM

## 2022-03-21 DIAGNOSIS — Z78.0 POSTMENOPAUSAL STATUS: ICD-10-CM

## 2022-03-21 DIAGNOSIS — R07.9 CHEST PAIN, UNSPECIFIED TYPE: ICD-10-CM

## 2022-03-21 DIAGNOSIS — R73.01 ELEVATED FASTING GLUCOSE: ICD-10-CM

## 2022-03-21 DIAGNOSIS — Z12.11 SCREEN FOR COLON CANCER: ICD-10-CM

## 2022-03-21 PROCEDURE — 99214 OFFICE O/P EST MOD 30 MIN: CPT | Mod: 25 | Performed by: FAMILY MEDICINE

## 2022-03-21 PROCEDURE — G0439 PPPS, SUBSEQ VISIT: HCPCS | Performed by: FAMILY MEDICINE

## 2022-03-21 RX ORDER — TRIAMTERENE AND HYDROCHLOROTHIAZIDE 37.5; 25 MG/1; MG/1
1 CAPSULE ORAL EVERY EVENING
Qty: 90 CAPSULE | Refills: 3 | Status: SHIPPED | OUTPATIENT
Start: 2022-03-21 | End: 2022-03-21

## 2022-03-21 RX ORDER — ATENOLOL 25 MG/1
25 TABLET ORAL DAILY
Qty: 90 TABLET | Refills: 3 | Status: SHIPPED | OUTPATIENT
Start: 2022-03-21 | End: 2023-03-13

## 2022-03-21 RX ORDER — ATENOLOL 25 MG/1
25 TABLET ORAL DAILY
Qty: 90 TABLET | Refills: 3 | Status: SHIPPED | OUTPATIENT
Start: 2022-03-21 | End: 2022-03-21

## 2022-03-21 RX ORDER — TRIAMTERENE AND HYDROCHLOROTHIAZIDE 37.5; 25 MG/1; MG/1
1 CAPSULE ORAL EVERY EVENING
Qty: 90 CAPSULE | Refills: 3 | Status: SHIPPED | OUTPATIENT
Start: 2022-03-21 | End: 2023-08-24

## 2022-03-21 SDOH — ECONOMIC STABILITY: FOOD INSECURITY: WITHIN THE PAST 12 MONTHS, YOU WORRIED THAT YOUR FOOD WOULD RUN OUT BEFORE YOU GOT MONEY TO BUY MORE.: NEVER TRUE

## 2022-03-21 SDOH — ECONOMIC STABILITY: TRANSPORTATION INSECURITY
IN THE PAST 12 MONTHS, HAS THE LACK OF TRANSPORTATION KEPT YOU FROM MEDICAL APPOINTMENTS OR FROM GETTING MEDICATIONS?: NO

## 2022-03-21 SDOH — ECONOMIC STABILITY: FOOD INSECURITY: WITHIN THE PAST 12 MONTHS, THE FOOD YOU BOUGHT JUST DIDN'T LAST AND YOU DIDN'T HAVE MONEY TO GET MORE.: NEVER TRUE

## 2022-03-21 SDOH — ECONOMIC STABILITY: INCOME INSECURITY: IN THE LAST 12 MONTHS, WAS THERE A TIME WHEN YOU WERE NOT ABLE TO PAY THE MORTGAGE OR RENT ON TIME?: NO

## 2022-03-21 SDOH — HEALTH STABILITY: PHYSICAL HEALTH: ON AVERAGE, HOW MANY DAYS PER WEEK DO YOU ENGAGE IN MODERATE TO STRENUOUS EXERCISE (LIKE A BRISK WALK)?: 1 DAY

## 2022-03-21 SDOH — ECONOMIC STABILITY: INCOME INSECURITY: HOW HARD IS IT FOR YOU TO PAY FOR THE VERY BASICS LIKE FOOD, HOUSING, MEDICAL CARE, AND HEATING?: NOT HARD AT ALL

## 2022-03-21 SDOH — ECONOMIC STABILITY: TRANSPORTATION INSECURITY
IN THE PAST 12 MONTHS, HAS LACK OF TRANSPORTATION KEPT YOU FROM MEETINGS, WORK, OR FROM GETTING THINGS NEEDED FOR DAILY LIVING?: NO

## 2022-03-21 ASSESSMENT — ENCOUNTER SYMPTOMS
DYSURIA: 0
PALPITATIONS: 0
ABDOMINAL PAIN: 0
DIZZINESS: 0
SHORTNESS OF BREATH: 0
ARTHRALGIAS: 1
SORE THROAT: 0
DIARRHEA: 0
WEAKNESS: 0
BREAST MASS: 0
CONSTIPATION: 0
HEMATURIA: 0
COUGH: 0
HEARTBURN: 0
FREQUENCY: 0
HEADACHES: 0
FEVER: 0
NERVOUS/ANXIOUS: 0
MYALGIAS: 0
PARESTHESIAS: 0
EYE PAIN: 0
HEMATOCHEZIA: 0
CHILLS: 0
NAUSEA: 0
JOINT SWELLING: 1

## 2022-03-21 ASSESSMENT — PATIENT HEALTH QUESTIONNAIRE - PHQ9
SUM OF ALL RESPONSES TO PHQ QUESTIONS 1-9: 3
SUM OF ALL RESPONSES TO PHQ QUESTIONS 1-9: 3
10. IF YOU CHECKED OFF ANY PROBLEMS, HOW DIFFICULT HAVE THESE PROBLEMS MADE IT FOR YOU TO DO YOUR WORK, TAKE CARE OF THINGS AT HOME, OR GET ALONG WITH OTHER PEOPLE: SOMEWHAT DIFFICULT

## 2022-03-21 ASSESSMENT — ANXIETY QUESTIONNAIRES
7. FEELING AFRAID AS IF SOMETHING AWFUL MIGHT HAPPEN: NOT AT ALL
4. TROUBLE RELAXING: NOT AT ALL
2. NOT BEING ABLE TO STOP OR CONTROL WORRYING: NOT AT ALL
GAD7 TOTAL SCORE: 1
7. FEELING AFRAID AS IF SOMETHING AWFUL MIGHT HAPPEN: NOT AT ALL
GAD7 TOTAL SCORE: 1
5. BEING SO RESTLESS THAT IT IS HARD TO SIT STILL: NOT AT ALL
1. FEELING NERVOUS, ANXIOUS, OR ON EDGE: SEVERAL DAYS
GAD7 TOTAL SCORE: 1
6. BECOMING EASILY ANNOYED OR IRRITABLE: NOT AT ALL
3. WORRYING TOO MUCH ABOUT DIFFERENT THINGS: NOT AT ALL

## 2022-03-21 ASSESSMENT — SOCIAL DETERMINANTS OF HEALTH (SDOH)
DO YOU BELONG TO ANY CLUBS OR ORGANIZATIONS SUCH AS CHURCH GROUPS UNIONS, FRATERNAL OR ATHLETIC GROUPS, OR SCHOOL GROUPS?: NO
HOW OFTEN DO YOU GET TOGETHER WITH FRIENDS OR RELATIVES?: TWICE A WEEK
HOW OFTEN DO YOU ATTEND CHURCH OR RELIGIOUS SERVICES?: MORE THAN 4 TIMES PER YEAR
IN A TYPICAL WEEK, HOW MANY TIMES DO YOU TALK ON THE PHONE WITH FAMILY, FRIENDS, OR NEIGHBORS?: MORE THAN THREE TIMES A WEEK

## 2022-03-21 ASSESSMENT — ACTIVITIES OF DAILY LIVING (ADL): CURRENT_FUNCTION: NO ASSISTANCE NEEDED

## 2022-03-21 ASSESSMENT — LIFESTYLE VARIABLES
HOW OFTEN DO YOU HAVE SIX OR MORE DRINKS ON ONE OCCASION: NEVER
HOW OFTEN DO YOU HAVE A DRINK CONTAINING ALCOHOL: NEVER

## 2022-03-21 NOTE — PROGRESS NOTES
"SUBJECTIVE:   Ailyn Beck is a 71 year old female who presents for Preventive Visit.    Here for physical.    Concerns:  Gout has been out of control for the last few months, seems to go from one spot to another, pain with walking, aching in joints.  Had a flare about 2 weeks ago, was given prednisone taper for 12 days.  Within hours she had improvement, was good for about a week.    Went to ED on 3-14-22, with atypical chest pain.  Kept overnight to monitor trops, ECHO was normal, Trops trended down, ok for discharge.    Exercise has decreased due to pain with walking.  Does not take a preventative medication for Gout such as allopurinol, worries about her kidneys as her  had kidney failure.    Are you in the first 12 months of your Medicare coverage?  No    Healthy Habits:     In general, how would you rate your overall health?  Good    Frequency of exercise:  1 day/week    Duration of exercise:  Less than 15 minutes    Do you usually eat at least 4 servings of fruit and vegetables a day, include whole grains    & fiber and avoid regularly eating high fat or \"junk\" foods?  Yes    Taking medications regularly:  Yes    Medication side effects:  Other    Ability to successfully perform activities of daily living:  No assistance needed    Home Safety:  No safety concerns identified    Hearing Impairment:  No hearing concerns    In the past 6 months, have you been bothered by leaking of urine? Yes    In general, how would you rate your overall mental or emotional health?  Excellent      PHQ-2 Total Score: 1    Additional concerns today:  Yes    Do you feel safe in your environment? Yes    Have you ever done Advance Care Planning? (For example, a Health Directive, POLST, or a discussion with a medical provider or your loved ones about your wishes): No, advance care planning information given to patient to review.  Patient declined advance care planning discussion at this time.       Fall risk  Fallen 2 or more " times in the past year?: No  Any fall with injury in the past year?: No    Cognitive Screening   1) Repeat 3 items (Leader, Season, Table)    2) Clock draw: NORMAL  3) 3 item recall: Recalls 3 objects  Results: 3 items recalled: COGNITIVE IMPAIRMENT LESS LIKELY    Mini-CogTM Copyright ANA LUISA Kennedy. Licensed by the author for use in [facility]; reprinted with permission (colt@[email or web address]). All rights reserved.      Do you have sleep apnea, excessive snoring or daytime drowsiness?: no    Reviewed and updated as needed this visit by clinical staff   Tobacco  Allergies  Meds   Med Hx  Surg Hx  Fam Hx  Soc Hx        Reviewed and updated as needed this visit by Provider     Meds             Social History     Tobacco Use     Smoking status: Never Smoker     Smokeless tobacco: Never Used   Substance Use Topics     Alcohol use: Not Currently         Alcohol Use 3/21/2022   Prescreen: >3 drinks/day or >7 drinks/week? Not Applicable   Prescreen: >3 drinks/day or >7 drinks/week? -       Gout concern was at the hospital on 03/14/22    Current providers sharing in care for this patient include:   Patient Care Team:  Salud Etienne MD as PCP - General (Family Practice)  Salud Etienne MD as Assigned PCP    The following health maintenance items are reviewed in Epic and correct as of today:  Health Maintenance Due   Topic Date Due     DEXA  Never done     MICROALBUMIN  Never done     URINALYSIS  Never done     ZOSTER IMMUNIZATION (2 of 2) 04/03/2020     COLORECTAL CANCER SCREENING  01/03/2021     LIPID  03/08/2022     FALL RISK ASSESSMENT  03/08/2022     MAMMO SCREENING  03/02/2022     Lab work is in process  Labs reviewed in EPIC  BP Readings from Last 3 Encounters:   03/21/22 131/75   03/15/22 120/66   03/04/22 121/78    Wt Readings from Last 3 Encounters:   03/21/22 88 kg (194 lb)   03/15/22 89.4 kg (197 lb)   03/04/22 87.5 kg (192 lb 12.8 oz)                  Patient Active Problem List    Diagnosis     Elevated fasting glucose     Essential hypertension     Gout     HLD (hyperlipidemia)     Vitiligo     Stage 3a chronic kidney disease (H)     Past Surgical History:   Procedure Laterality Date     [place] TOTAL HIP ARTHROPLASTY      right     XR ELBOW SURGERY VITALIY RIGHT         Social History     Tobacco Use     Smoking status: Never Smoker     Smokeless tobacco: Never Used   Substance Use Topics     Alcohol use: Not Currently     Family History   Problem Relation Age of Onset     Hypertension Mother      Emphysema Father      Breast Cancer Sister      Prostate Cancer Brother          Current Outpatient Medications   Medication Sig Dispense Refill     atenolol (TENORMIN) 25 MG tablet Take 1 tablet (25 mg) by mouth daily 90 tablet 3     ibuprofen (ADVIL/MOTRIN) 200 MG tablet Take 400 mg by mouth every 6 hours as needed for mild pain       Multiple Vitamins-Minerals (MULTIVITAMIN GUMMIES WOMENS) CHEW Take 1 chew tab by mouth every evening       triamterene-HCTZ (DYAZIDE) 37.5-25 MG capsule Take 1 capsule by mouth every evening 90 capsule 3     No Known Allergies  Recent Labs   Lab Test 03/14/22  2309 03/04/22  1048 05/19/21  1630 03/08/21  1030 02/07/20  0929   A1C  --   --   --  5.4  --    LDL  --   --   --  109* 128*   HDL  --   --   --  41* 44*   TRIG  --   --   --  156* 140   ALT  --  27 28 24 29   CR 1.09* 0.81 0.92 1.09* 0.90   GFRESTIMATED 54* 77 62 51* 65   GFRESTBLACK  --   --  72 59* 75   POTASSIUM 4.2 3.7 4.1 3.2* 3.9   TSH  --   --   --  1.67 2.21      Mammogram Screening: Mammogram Screening: Recommended mammography every 1-2 years with patient discussion and risk factor consideration        Review of Systems   Constitutional: Negative for chills and fever.   HENT: Negative for congestion, ear pain, hearing loss and sore throat.    Eyes: Negative for pain and visual disturbance.   Respiratory: Negative for cough and shortness of breath.    Cardiovascular: Negative for chest pain, palpitations  "and peripheral edema.   Gastrointestinal: Negative for abdominal pain, constipation, diarrhea, heartburn, hematochezia and nausea.   Breasts:  Negative for tenderness, breast mass and discharge.   Genitourinary: Negative for dysuria, frequency, genital sores, hematuria, pelvic pain, urgency, vaginal bleeding and vaginal discharge.   Musculoskeletal: Positive for arthralgias and joint swelling. Negative for myalgias.   Skin: Negative for rash.   Neurological: Negative for dizziness, weakness, headaches and paresthesias.   Psychiatric/Behavioral: Negative for mood changes. The patient is not nervous/anxious.          OBJECTIVE:   /75 (BP Location: Right arm, Patient Position: Chair, Cuff Size: Adult Large)   Pulse 87   Temp 97.7  F (36.5  C) (Oral)   Ht 1.651 m (5' 5\")   Wt 88 kg (194 lb)   SpO2 97%   BMI 32.28 kg/m   Estimated body mass index is 32.28 kg/m  as calculated from the following:    Height as of this encounter: 1.651 m (5' 5\").    Weight as of this encounter: 88 kg (194 lb).  Physical Exam  GENERAL APPEARANCE: healthy, alert and no distress  EYES: Eyes grossly normal to inspection, PERRL and conjunctivae and sclerae normal  HENT: ear canals and TM's normal, nose and mouth without ulcers or lesions, oropharynx clear and oral mucous membranes moist  NECK: no adenopathy, no asymmetry, masses, or scars and thyroid normal to palpation  RESP: lungs clear to auscultation - no rales, rhonchi or wheezes  CV: regular rate and rhythm, normal S1 S2, no S3 or S4, no murmur, click or rub, no peripheral edema and peripheral pulses strong  ABDOMEN: soft, nontender, no hepatosplenomegaly, no masses and bowel sounds normal  MS: no musculoskeletal defects are noted and gait is age appropriate without ataxia  SKIN: no suspicious lesions or rashes  NEURO: Normal strength and tone, sensory exam grossly normal, mentation intact and speech normal  PSYCH: mentation appears normal and affect normal/bright    Diagnostic " Test Results:  Labs reviewed in Epic    ASSESSMENT / PLAN:   (Z00.00) Encounter for Medicare annual wellness exam  (primary encounter diagnosis)  Comment: Exam completed today, routine health maintenance items updated as able.  Labs ordered.  Follow up one year or sooner as needed.    (R07.9) Chest pain, unspecified type  Comment: Resolved, troponins and echo were normal.  Will send patient for stress test and follow-up with cardiology  Plan: Exercise Stress Test - Adult            (M1A.09X0) Chronic gout of multiple sites, unspecified cause  Comment: Continue to monitor.  Plan: Treat flares as needed    (E78.5) Hyperlipidemia, unspecified hyperlipidemia type  Plan: Lipid panel reflex to direct LDL Fasting            (I10) Benign essential hypertension  Comment: Well-controlled on current medications, refilled  Plan: atenolol (TENORMIN) 25 MG tablet,         triamterene-HCTZ (DYAZIDE) 37.5-25 MG capsule,         DISCONTINUED: triamterene-HCTZ (DYAZIDE)         37.5-25 MG capsule, DISCONTINUED: atenolol         (TENORMIN) 25 MG tablet            (N18.31) Stage 3a chronic kidney disease (H)  Comment: Due for urinalysis, recommendations pending results  Plan: UA Macro with Reflex to Micro and Culture - lab        collect, Albumin Random Urine Quantitative with        Creat Ratio            (R73.01) Elevated fasting glucose  Comment: Due for labs, recommendations pending results  Plan: Hemoglobin A1c            (Z78.0) Postmenopausal status  Plan: DX Hip/Pelvis/Spine            (Z12.31) Visit for screening mammogram  Plan: *MA Screening Digital Bilateral            (Z12.11) Screen for colon cancer  Plan: Adult Gastro Ref - Procedure Only              Patient has been advised of split billing requirements and indicates understanding: Yes    COUNSELING:  Reviewed preventive health counseling, as reflected in patient instructions    Estimated body mass index is 32.28 kg/m  as calculated from the following:    Height as of  "this encounter: 1.651 m (5' 5\").    Weight as of this encounter: 88 kg (194 lb).    Weight management plan: Discussed healthy diet and exercise guidelines    She reports that she has never smoked. She has never used smokeless tobacco.      Appropriate preventive services were discussed with this patient, including applicable screening as appropriate for cardiovascular disease, diabetes, osteopenia/osteoporosis, and glaucoma.  As appropriate for age/gender, discussed screening for colorectal cancer, prostate cancer, breast cancer, and cervical cancer. Checklist reviewing preventive services available has been given to the patient.    Reviewed patients plan of care and provided an AVS. The Basic Care Plan (routine screening as documented in Health Maintenance) for Ailyn meets the Care Plan requirement. This Care Plan has been established and reviewed with the Patient.    Counseling Resources:  ATP IV Guidelines  Pooled Cohorts Equation Calculator  Breast Cancer Risk Calculator  Breast Cancer: Medication to Reduce Risk  FRAX Risk Assessment  ICSI Preventive Guidelines  Dietary Guidelines for Americans, 2010  [employer]'s MyPlate  ASA Prophylaxis  Lung CA Screening    April J. Santo Donovan MD  [facility]    Identified Health Risks:  Answers for HPI/ROS submitted by the patient on 3/21/2022  If you checked off any problems, how difficult have these problems made it for you to do your work, take care of things at home, or get along with other people?: Somewhat difficult  PHQ9 TOTAL SCORE: 3  SAIMA 7 TOTAL SCORE: 1      "

## 2022-03-21 NOTE — PATIENT INSTRUCTIONS
[facility] [phone]      Patient Education   Personalized Prevention Plan  You are due for the preventive services outlined below.  Your care team is available to assist you in scheduling these services.  If you have already completed any of these items, please share that information with your care team to update in your medical record.  Health Maintenance Due   Topic Date Due     Osteoporosis Screening  Never done     Kidney Microalbumin Urine Test  Never done     Urine Test  Never done     Zoster (Shingles) Vaccine (2 of 2) 2020     Colorectal Cancer Screening  2021     Annual Wellness Visit  2021     Flu Vaccine (1) 2021     PHQ-2 (once per calendar year)  2022     Cholesterol Lab  2022     FALL RISK ASSESSMENT  2022     Mammogram  2022        Patient Education     Gout Diet  Gout is a painful condition caused by an excess of uric acid, a waste product made by the body. Uric acid forms crystals that collect in the joints. The immune response to these crystals brings on symptoms of joint pain and swelling. This is called a gout attack. Often, medications and diet changes are combined to manage gout. Below are some guidelines for changing your diet to help you manage gout and prevent attacks. Your healthcare provider will help you determine the best eating plan for you.  Eating to manage gout  Weight loss for those who are overweight may help reduce gout attacks.  Eat less of these foods  Eating too many foods containing purines may raise the levels of uric acid in your body. This raises your risk for a gout attack. Try to limit these foods and drinks:    Alcohol, such as beer and red wine. You may be told to avoid alcohol completely.    Soft drinks that contain sugar or high fructose corn syrup    Certain fish, including anchovies, sardines, fish eggs, and herring    Shellfish    Certain meats, such as red meat, hot dogs,  luncheon meats, and turkey    Organ meats, such as liver, kidneys, and sweetbreads    Legumes, such as dried beans and peas    Other high fat foods such as gravy, whole milk, and high fat cheeses    Vegetables such as asparagus, cauliflower, spinach, and mushrooms used to be thought to contribute to an increased risk for a gout attack, but recent studies show that high purine vegetables don't increase the risk for a gout attack.  Eat more of these foods  Other foods may be helpful for people with gout. Add some of these foods to your diet:    Cherries contain chemicals that may lower uric acid.    Omega fatty acids. These are found in some fatty fish such as salmon, certain oils (flax, olive, or nut), and nuts themselves. Omega fatty acids may help prevent inflammation due to gout.    Dairy products that are low-fat or fat-free, such as cheese and yogurt    Complex carbohydrate foods, including whole grains, brown rice, oats, and beans    Coffee, in moderation    Water, approximately 64 ounces per day  Follow-up care  Follow up with your healthcare provider as advised.  When to seek medical advice  Call your healthcare provider right away if any of these occur:    Return of gout symptoms, usually at night:    Severe pain, swelling, and heat in a joint, especially the base of the big toe    Affected joint is hard to move    Skin of the affected joint is purple or red    Fever of 100.4 F (38 C) or higher    Pain that doesn't get better even with prescribed medicine   Janie last reviewed this educational content on 6/1/2018 2000-2021 [employer]. All rights reserved. This information is not intended as a substitute for professional medical care. Always follow your healthcare professional's instructions.

## 2022-03-22 ASSESSMENT — PATIENT HEALTH QUESTIONNAIRE - PHQ9: SUM OF ALL RESPONSES TO PHQ QUESTIONS 1-9: 3

## 2022-03-22 ASSESSMENT — ANXIETY QUESTIONNAIRES: GAD7 TOTAL SCORE: 1

## 2022-03-29 ENCOUNTER — LAB (OUTPATIENT)
Dept: LAB | Facility: CLINIC | Age: 72
End: 2022-03-29
Payer: COMMERCIAL

## 2022-03-29 DIAGNOSIS — N18.31 STAGE 3A CHRONIC KIDNEY DISEASE (H): ICD-10-CM

## 2022-03-29 DIAGNOSIS — E78.5 HYPERLIPIDEMIA, UNSPECIFIED HYPERLIPIDEMIA TYPE: ICD-10-CM

## 2022-03-29 DIAGNOSIS — R73.01 ELEVATED FASTING GLUCOSE: ICD-10-CM

## 2022-03-29 LAB
ALBUMIN UR-MCNC: NEGATIVE MG/DL
APPEARANCE UR: CLEAR
BACTERIA #/AREA URNS HPF: ABNORMAL /HPF
BILIRUB UR QL STRIP: NEGATIVE
CHOLEST SERPL-MCNC: 218 MG/DL
COLOR UR AUTO: YELLOW
FASTING STATUS PATIENT QL REPORTED: YES
GLUCOSE UR STRIP-MCNC: NEGATIVE MG/DL
HBA1C MFR BLD: 5.3 % (ref 0–5.6)
HDLC SERPL-MCNC: 51 MG/DL
HGB UR QL STRIP: ABNORMAL
KETONES UR STRIP-MCNC: NEGATIVE MG/DL
LDLC SERPL CALC-MCNC: 141 MG/DL
LEUKOCYTE ESTERASE UR QL STRIP: NEGATIVE
NITRATE UR QL: NEGATIVE
NONHDLC SERPL-MCNC: 167 MG/DL
PH UR STRIP: 5 [PH] (ref 5–7)
RBC #/AREA URNS AUTO: ABNORMAL /HPF
SP GR UR STRIP: 1.01 (ref 1–1.03)
SQUAMOUS #/AREA URNS AUTO: ABNORMAL /LPF
TRIGL SERPL-MCNC: 131 MG/DL
UROBILINOGEN UR STRIP-ACNC: 0.2 E.U./DL
WBC #/AREA URNS AUTO: ABNORMAL /HPF

## 2022-03-29 PROCEDURE — 80061 LIPID PANEL: CPT

## 2022-03-29 PROCEDURE — 83036 HEMOGLOBIN GLYCOSYLATED A1C: CPT

## 2022-03-29 PROCEDURE — 81001 URINALYSIS AUTO W/SCOPE: CPT

## 2022-03-29 PROCEDURE — 36415 COLL VENOUS BLD VENIPUNCTURE: CPT

## 2022-03-29 PROCEDURE — 82043 UR ALBUMIN QUANTITATIVE: CPT

## 2022-03-30 ENCOUNTER — HOSPITAL ENCOUNTER (OUTPATIENT)
Dept: NUCLEAR MEDICINE | Facility: CLINIC | Age: 72
Setting detail: NUCLEAR MEDICINE
Discharge: HOME | End: 2022-03-30
Attending: PHYSICIAN ASSISTANT
Payer: COMMERCIAL

## 2022-03-30 ENCOUNTER — HOSPITAL ENCOUNTER (OUTPATIENT)
Dept: CARDIOLOGY | Facility: CLINIC | Age: 72
Discharge: HOME | End: 2022-03-30
Attending: PHYSICIAN ASSISTANT
Payer: COMMERCIAL

## 2022-03-30 DIAGNOSIS — R06.02 SHORTNESS OF BREATH: ICD-10-CM

## 2022-03-30 DIAGNOSIS — R07.9 CHEST PAIN, UNSPECIFIED TYPE: ICD-10-CM

## 2022-03-30 LAB
CREAT UR-MCNC: 93 MG/DL
CV STRESS MAX HR HE: 144
MICROALBUMIN UR-MCNC: <5 MG/L
MICROALBUMIN/CREAT UR: NORMAL MG/G{CREAT}
RATE PRESSURE PRODUCT: NORMAL
STRESS ECHO BASELINE DIASTOLIC HE: 80
STRESS ECHO BASELINE HR: 111 BPM
STRESS ECHO BASELINE SYSTOLIC BP: 140
STRESS ECHO CALCULATED PERCENT HR: 97 %
STRESS ECHO LAST STRESS DIASTOLIC BP: 80
STRESS ECHO LAST STRESS SYSTOLIC BP: 160
STRESS ECHO POST ESTIMATED WORKLOAD: 5 METS
STRESS ECHO POST EXERCISE DUR MIN: 6 MIN
STRESS ECHO POST EXERCISE DUR SEC: 10 SEC
STRESS ECHO TARGET HR: 149

## 2022-03-30 PROCEDURE — 93016 CV STRESS TEST SUPVJ ONLY: CPT | Performed by: INTERNAL MEDICINE

## 2022-03-30 PROCEDURE — 93017 CV STRESS TEST TRACING ONLY: CPT

## 2022-03-30 PROCEDURE — 78452 HT MUSCLE IMAGE SPECT MULT: CPT

## 2022-03-30 PROCEDURE — A9502 TC99M TETROFOSMIN: HCPCS | Performed by: PHYSICIAN ASSISTANT

## 2022-03-30 PROCEDURE — 93018 CV STRESS TEST I&R ONLY: CPT | Performed by: INTERNAL MEDICINE

## 2022-03-30 PROCEDURE — 78452 HT MUSCLE IMAGE SPECT MULT: CPT | Mod: 26 | Performed by: INTERNAL MEDICINE

## 2022-03-30 PROCEDURE — 343N000001 HC RX 343: Performed by: PHYSICIAN ASSISTANT

## 2022-03-30 RX ADMIN — TETROFOSMIN 32.5 MCI.: 1.38 INJECTION, POWDER, LYOPHILIZED, FOR SOLUTION INTRAVENOUS at 10:38

## 2022-03-30 RX ADMIN — TETROFOSMIN 10.1 MCI.: 1.38 INJECTION, POWDER, LYOPHILIZED, FOR SOLUTION INTRAVENOUS at 09:09

## 2022-04-12 DIAGNOSIS — R31.9 HEMATURIA, UNSPECIFIED TYPE: Primary | ICD-10-CM

## 2022-04-12 DIAGNOSIS — E78.5 HYPERLIPIDEMIA, UNSPECIFIED HYPERLIPIDEMIA TYPE: Primary | ICD-10-CM

## 2022-04-12 RX ORDER — ATORVASTATIN CALCIUM 20 MG/1
20 TABLET, FILM COATED ORAL DAILY
Qty: 90 TABLET | Refills: 1 | Status: SHIPPED | OUTPATIENT
Start: 2022-04-12 | End: 2023-11-21

## 2022-06-05 ENCOUNTER — HEALTH MAINTENANCE LETTER (OUTPATIENT)
Age: 72
End: 2022-06-05

## 2022-07-13 ENCOUNTER — TELEPHONE (OUTPATIENT)
Dept: FAMILY MEDICINE | Facility: CLINIC | Age: 72
End: 2022-07-13

## 2022-07-13 NOTE — TELEPHONE ENCOUNTER
Patient Quality Outreach    Patient is due for the following:   Breast Cancer Screening - Mammogram    NEXT STEPS:   Schedule a office visit for mammogram    Type of outreach:    Phone, spoke to patient/parent. scheduled appt 7/29/22      Questions for provider review:    None     Charlotte Soto, [facility]

## 2022-07-22 ENCOUNTER — OFFICE VISIT (OUTPATIENT)
Dept: URGENT CARE | Facility: URGENT CARE | Age: 72
End: 2022-07-22
Payer: COMMERCIAL

## 2022-07-22 VITALS
BODY MASS INDEX: 32.99 KG/M2 | HEIGHT: 65 IN | DIASTOLIC BLOOD PRESSURE: 77 MMHG | WEIGHT: 198 LBS | TEMPERATURE: 97.2 F | SYSTOLIC BLOOD PRESSURE: 139 MMHG | RESPIRATION RATE: 14 BRPM | HEART RATE: 72 BPM

## 2022-07-22 DIAGNOSIS — M25.511 ACUTE PAIN OF RIGHT SHOULDER: Primary | ICD-10-CM

## 2022-07-22 DIAGNOSIS — M10.011 ACUTE IDIOPATHIC GOUT OF RIGHT SHOULDER: ICD-10-CM

## 2022-07-22 PROCEDURE — 99213 OFFICE O/P EST LOW 20 MIN: CPT | Performed by: FAMILY MEDICINE

## 2022-07-22 RX ORDER — INDOMETHACIN 25 MG/1
25 CAPSULE ORAL 2 TIMES DAILY WITH MEALS
Qty: 30 CAPSULE | Refills: 0 | Status: SHIPPED | OUTPATIENT
Start: 2022-07-22 | End: 2022-08-15

## 2022-07-22 RX ORDER — PREDNISONE 20 MG/1
40 TABLET ORAL DAILY
Qty: 10 TABLET | Refills: 0 | Status: SHIPPED | OUTPATIENT
Start: 2022-07-22 | End: 2022-07-27

## 2022-07-22 NOTE — PROGRESS NOTES
"SUBJECTIVE:  Chief Complaint   Patient presents with     Musculoskeletal Problem     Right shoulder pain for 3 days. Very hard to sleep at night. Ibuprofen not helping much.      Ailyn Beck is a 72 year old female who presents with a chief complaint of right shoulder pain.    Works at home, has to stretch a little more and thinks that this may have caused gout.  Has been taking ibuprofen and aleve without improvement.  Last time had prednisone to help with gout flare and worked very well, had waited to come in and was very painful.    Denies any falls or injury to shoulder.  Denies eating foods that would trigger gout.  Is working on drinking plenty of fluids.    Past Medical History:   Diagnosis Date     Elevated fasting glucose      Gout      Hyperlipidemia      Hypertension      Vitiligo      Current Outpatient Medications   Medication Sig Dispense Refill     atenolol (TENORMIN) 25 MG tablet Take 1 tablet (25 mg) by mouth daily 90 tablet 3     atorvastatin (LIPITOR) 20 MG tablet Take 1 tablet (20 mg) by mouth daily 90 tablet 1     ibuprofen (ADVIL/MOTRIN) 200 MG tablet Take 400 mg by mouth every 6 hours as needed for mild pain       Multiple Vitamins-Minerals (MULTIVITAMIN GUMMIES WOMENS) CHEW Take 1 chew tab by mouth every evening       triamterene-HCTZ (DYAZIDE) 37.5-25 MG capsule Take 1 capsule by mouth every evening 90 capsule 3     Social History     Tobacco Use     Smoking status: Never Smoker     Smokeless tobacco: Never Used   Substance Use Topics     Alcohol use: Not Currently       ROS:  Review of systems negative except as stated above.    EXAM:   /77 (BP Location: Left arm, Patient Position: Sitting, Cuff Size: Adult Large)   Pulse 72   Temp 97.2  F (36.2  C) (Oral)   Resp 14   Ht 1.651 m (5' 5\")   Wt 89.8 kg (198 lb)   Breastfeeding No   BMI 32.95 kg/m    M/S Exam:right shoulder with diffuse tenderness and decreased ROM GENERAL APPEARANCE: healthy, alert and no distress  EXTREMITIES: " peripheral pulses normal  PSYCH:alert, affect bright    X-RAY was not done.    ASSESSMENT/PLAN:  (M25.511) Acute pain of right shoulder  (primary encounter diagnosis)  Comment: probable gout  Plan: predniSONE (DELTASONE) 20 MG tablet,         indomethacin (INDOCIN) 25 MG capsule            (M10.011) Acute idiopathic gout of right shoulder  Plan: predniSONE (DELTASONE) 20 MG tablet,         indomethacin (INDOCIN) 25 MG capsule            Reviewed gout flare and that flares can occur in any joint.  As patient report good improvement with steroids, will give RX prednisone burst.  Caution in use of NSAIDs, RX indomethacin given to try instead of ibuprofen for gout flares.  Discussed possible rotator cuff tendinitis as other etiology for right shoulder pain    Follow up with primary provider if no improvement of symptoms in 1 week    Elver Figueroa MD  July 22, 2022 6:40 PM

## 2022-07-29 ENCOUNTER — ANCILLARY PROCEDURE (OUTPATIENT)
Dept: MAMMOGRAPHY | Facility: CLINIC | Age: 72
End: 2022-07-29
Payer: COMMERCIAL

## 2022-07-29 ENCOUNTER — NURSE TRIAGE (OUTPATIENT)
Dept: FAMILY MEDICINE | Facility: CLINIC | Age: 72
End: 2022-07-29

## 2022-07-29 DIAGNOSIS — Z12.31 VISIT FOR SCREENING MAMMOGRAM: ICD-10-CM

## 2022-07-29 PROCEDURE — 77067 SCR MAMMO BI INCL CAD: CPT | Mod: TC | Performed by: RADIOLOGY

## 2022-07-29 PROCEDURE — 77063 BREAST TOMOSYNTHESIS BI: CPT | Mod: TC | Performed by: RADIOLOGY

## 2022-07-29 NOTE — TELEPHONE ENCOUNTER
General Call      Reason for Call:  Shoulder Pain    What are your questions or concerns:  Was seen in Urgent Care last Friday for shoulder pain.  Was given Rx of Prednisone.  Ailyn still is having Shoulder pain.  What should she do now.    Date of last appointment with provider: 03/21/22-Dr Santo Donovan    Could we send this information to you in [email or web address] or would you prefer to receive a phone call?:   Patient would prefer a phone call   Okay to leave a detailed message?: Yes at Home number on file [phone] (home)

## 2022-07-29 NOTE — TELEPHONE ENCOUNTER
"S-(situation): right shoulder pain    B-(background): Shoulder pain started around 7/16/22. Pt went to urgent care on 7/22/22 and UC diagnosed this as a gout flare. 5 day course of prednisone 40mg given. Pain never resolved completely but improved and then pain started worsening 7/27/22    A-(assessment): Pt states \"there is no no redness or swelling like I've seen in previous gout flares\". Pt is still working and using arm at computer despite pain (pt states \"I can't get off of work). Pt states \"standing under hot water helps a lot\". Pt rates pain at 1 or 2 if not moving but states \"if I try to move arm too much, the pain shoots up to a 7 or 8\". Pt has been unable to lift arm to side for past two weeks.     Denies recent injury, swelling, redness, rash, fever, numbness, weakness, neck pain, chest pain, difficulty breathing    R-(recommendations): Protocol recommends pt be seen within 3 days in clinic. Reviewed care advice with pt under care tab. Instructed pt to avoid overuse of arm. Scheduled pt for 8/2/22 with April Santo Donovan MD. Instructed pt to call back if worsening symptoms.    Patient was given an opportunity to ask questions, verbalized understanding of plan, and is agreeable.    Cecilia Ricks RN      Reason for Disposition    MODERATE pain (e.g., interferes with normal activities) and present > 3 days    Additional Information    Negative: Shock suspected (e.g., cold/pale/clammy skin, too weak to stand, low BP, rapid pulse)    Negative: Similar pain previously and it was from 'heart attack'    Negative: Similar pain previously and it was from 'angina' and not relieved by nitroglycerin    Negative: Sounds like a life-threatening emergency to the triager    Negative: Chest pain    Negative: Followed an injury to shoulder    Negative: Difficulty breathing or unusual sweating (e.g., sweating without exertion)    Negative: Pain lasting > 5 minutes and pain also present in chest (Exception: pain is clearly " "made worse by movement)    Negative: Age > 40 and no obvious cause and pain even when not moving the arm (Exception: pain is clearly made worse by moving arm or bending neck)    Negative: Red area or streak and fever    Negative: Swollen joint and fever    Negative: Entire arm is swollen    Negative: Patient sounds very sick or weak to the triager    Negative: SEVERE pain (e.g., excruciating, unable to do any normal activities)    Negative: Shoulder pains with exertion (e.g., walking) and pain goes away on resting and not present now    Negative: Painful rash with multiple small blisters grouped together (i.e., dermatomal distribution or 'band' or 'stripe')    Negative: Looks like a boil, infected sore, deep ulcer or other infected rash (spreading redness, pus)    Negative: Localized rash is very painful (no fever)    Negative: Weakness (i.e., loss of strength) in hand or fingers (Exception: not truly weak; hand feels weak because of pain)    Negative: Numbness (i.e., loss of sensation) in hand or fingers    Negative: Unable to use arm at all and because of shoulder pain or stiffness    Negative: Patient wants to be seen    Answer Assessment - Initial Assessment Questions  1. ONSET: \"When did the pain start?\"      Started around 7/16/22  2. LOCATION: \"Where is the pain located?\"      Right shoulder very top of shoulder  3. PAIN: \"How bad is the pain?\" (Scale 1-10; or mild, moderate, severe)    - MILD (1-3): doesn't interfere with normal activities    - MODERATE (4-7): interferes with normal activities (e.g., work or school) or awakens from sleep    - SEVERE (8-10): excruciating pain, unable to do any normal activities, unable to move arm at all due to pain      If I don't move (sitting still) 1 or 2, if I try to move arm to much shoots up to a 7 or 8  Inability to lift arm to side for past couple weeks  4. WORK OR EXERCISE: \"Has there been any recent work or exercise that involved this part of the body?\"      no  5. " "CAUSE: \"What do you think is causing the shoulder pain?\"      Possibly gout  6. OTHER SYMPTOMS: \"Do you have any other symptoms?\" (e.g., neck pain, swelling, rash, fever, numbness, weakness)      Denies swelling, redness, rash, fever, numbness, weakness, neck pain  7. PREGNANCY: \"Is there any chance you are pregnant?\" \"When was your last menstrual period?\"     no    Protocols used: SHOULDER PAIN-A-[place]    "

## 2022-08-08 ENCOUNTER — OFFICE VISIT (OUTPATIENT)
Dept: URGENT CARE | Facility: URGENT CARE | Age: 72
End: 2022-08-08
Payer: COMMERCIAL

## 2022-08-08 VITALS
RESPIRATION RATE: 16 BRPM | TEMPERATURE: 98 F | OXYGEN SATURATION: 98 % | WEIGHT: 196 LBS | HEART RATE: 75 BPM | SYSTOLIC BLOOD PRESSURE: 140 MMHG | BODY MASS INDEX: 32.62 KG/M2 | DIASTOLIC BLOOD PRESSURE: 90 MMHG

## 2022-08-08 DIAGNOSIS — M25.531 RIGHT WRIST PAIN: Primary | ICD-10-CM

## 2022-08-08 PROCEDURE — 99214 OFFICE O/P EST MOD 30 MIN: CPT | Performed by: PHYSICIAN ASSISTANT

## 2022-08-08 RX ORDER — TRAMADOL HYDROCHLORIDE 50 MG/1
50 TABLET ORAL EVERY 6 HOURS PRN
Qty: 6 TABLET | Refills: 0 | Status: SHIPPED | OUTPATIENT
Start: 2022-08-08 | End: 2022-08-11

## 2022-08-08 NOTE — PATIENT INSTRUCTIONS
Patient was educated on the natural course of condition. Conservative measures discussed including rest, ice, compression, elevation, and over-the-counter analgesics (Tylenol or Ibuprofen) as needed. See your primary care provider if symptoms worsen or do not improve in 7 days. Seek emergency care if you develop severe pain/swelling, inability to move extremity, skin paleness, or weakness.

## 2022-08-08 NOTE — PROGRESS NOTES
URGENT CARE VISIT:    SUBJECTIVE:   Chief Complaint   Patient presents with     Arthritis      Ailyn Beck is a 72 year old female who presents with a chief complaint of right wrist and shoulder pain.  Symptoms began over a week ago and is worsening. She has a history of arthritis and gout. She was prescribed indomethacin and prednisone 2 weeks ago. Prednisone helped temporarily. Pain exacerbated by movement. Relieved by rest.  She treated it initially with Ibuprofen, indomethacin, and prednisone. This is the first time this type of injury has occurred to this patient.     PMH:   Past Medical History:   Diagnosis Date     Elevated fasting glucose      Gout      Hyperlipidemia      Hypertension      Vitiligo      Allergies: Patient has no known allergies.   Medications:   Current Outpatient Medications   Medication Sig Dispense Refill     diclofenac (VOLTAREN) 1 % topical gel Apply 2 g topically 4 times daily for 7 days 56 g 0     traMADol (ULTRAM) 50 MG tablet Take 1 tablet (50 mg) by mouth every 6 hours as needed for severe pain 6 tablet 0     atenolol (TENORMIN) 25 MG tablet Take 1 tablet (25 mg) by mouth daily 90 tablet 3     atorvastatin (LIPITOR) 20 MG tablet Take 1 tablet (20 mg) by mouth daily 90 tablet 1     ibuprofen (ADVIL/MOTRIN) 200 MG tablet Take 400 mg by mouth every 6 hours as needed for mild pain       indomethacin (INDOCIN) 25 MG capsule Take 1 capsule (25 mg) by mouth 2 times daily (with meals) 30 capsule 0     Multiple Vitamins-Minerals (MULTIVITAMIN GUMMIES WOMENS) CHEW Take 1 chew tab by mouth every evening       triamterene-HCTZ (DYAZIDE) 37.5-25 MG capsule Take 1 capsule by mouth every evening 90 capsule 3     Social History:   Social History     Tobacco Use     Smoking status: Never Smoker     Smokeless tobacco: Never Used   Substance Use Topics     Alcohol use: Not Currently       ROS:  Review of systems negative except as stated above.    OBJECTIVE:  BP (!) 140/90 (BP Location: Right arm,  Patient Position: Chair, Cuff Size: Adult Large)   Pulse 75   Temp 98  F (36.7  C) (Oral)   Resp 16   Wt 88.9 kg (196 lb)   SpO2 98%   Breastfeeding No   BMI 32.62 kg/m    GENERAL APPEARANCE: healthy, alert and no distress  MUSCULOSKELETAL: moderate TTP over right wrist. FROM wrist and shoulder.   EXTREMITIES: peripheral pulses normal  SKIN: mild/moderate edema and mild erythema over right wrist.   NEURO: sensation intact.      ASSESSMENT:    ICD-10-CM    1. Right wrist pain  M25.531 diclofenac (VOLTAREN) 1 % topical gel     traMADol (ULTRAM) 50 MG tablet       PLAN:  30 minutes spent on the date of the encounter doing chart review, review of outside records, patient visit and documentation.   Patient Instructions   Patient was educated on the natural course of condition. She was seen 2 weeks ago for same symptoms. She finished indomethacin and prednisone. Prednisone helped but I don't want to give it again in such a narrow time frame. Start Voltaren gel. Use tramadol for severe pain. Side effects discussed. Conservative measures discussed including rest, ice, and over-the-counter analgesics (Tylenol or Ibuprofen) as needed. See your primary care provider if symptoms worsen or do not improve in 7 days. Seek emergency care if you develop severe pain/swelling, inability to move extremity, skin paleness, or weakness.     Patient verbalized understanding and is agreeable to plan. The patient was discharged ambulatory and in stable condition.    Monica Valenzuela PA-C on 8/8/2022 at 6:56 PM

## 2022-08-12 ENCOUNTER — TELEPHONE (OUTPATIENT)
Dept: FAMILY MEDICINE | Facility: CLINIC | Age: 72
End: 2022-08-12

## 2022-08-12 NOTE — TELEPHONE ENCOUNTER
Patient called following up from her UC visit on 8/8/22. Since then the pain has not gotten worse but it has not gotten better. The pain medication that was prescribed has not been effective at controlling patient's pain. Patient is struggling to sleep and get dressed due to pain.     Requested patient be seen in UC again to be evaluated.     Annia Ellison RN on 8/12/2022 at 1:26 PM

## 2022-08-15 ENCOUNTER — ANCILLARY PROCEDURE (OUTPATIENT)
Dept: GENERAL RADIOLOGY | Facility: CLINIC | Age: 72
End: 2022-08-15
Attending: FAMILY MEDICINE
Payer: COMMERCIAL

## 2022-08-15 ENCOUNTER — OFFICE VISIT (OUTPATIENT)
Dept: FAMILY MEDICINE | Facility: CLINIC | Age: 72
End: 2022-08-15

## 2022-08-15 VITALS
TEMPERATURE: 97.6 F | WEIGHT: 194 LBS | HEART RATE: 71 BPM | DIASTOLIC BLOOD PRESSURE: 81 MMHG | SYSTOLIC BLOOD PRESSURE: 137 MMHG | BODY MASS INDEX: 32.28 KG/M2 | OXYGEN SATURATION: 100 %

## 2022-08-15 DIAGNOSIS — M1A.09X0 CHRONIC GOUT OF MULTIPLE SITES, UNSPECIFIED CAUSE: ICD-10-CM

## 2022-08-15 DIAGNOSIS — M75.01 ADHESIVE CAPSULITIS OF RIGHT SHOULDER: ICD-10-CM

## 2022-08-15 DIAGNOSIS — M25.511 RIGHT SHOULDER PAIN, UNSPECIFIED CHRONICITY: ICD-10-CM

## 2022-08-15 DIAGNOSIS — M25.511 RIGHT SHOULDER PAIN, UNSPECIFIED CHRONICITY: Primary | ICD-10-CM

## 2022-08-15 PROCEDURE — 73030 X-RAY EXAM OF SHOULDER: CPT | Mod: TC | Performed by: RADIOLOGY

## 2022-08-15 PROCEDURE — 99214 OFFICE O/P EST MOD 30 MIN: CPT | Performed by: FAMILY MEDICINE

## 2022-08-15 RX ORDER — ALLOPURINOL 100 MG/1
100 TABLET ORAL DAILY
Qty: 90 TABLET | Refills: 0 | Status: SHIPPED | OUTPATIENT
Start: 2022-08-15 | End: 2024-04-09

## 2022-08-15 RX ORDER — IBUPROFEN 800 MG/1
800 TABLET, FILM COATED ORAL EVERY 8 HOURS PRN
Qty: 90 TABLET | Refills: 0 | Status: SHIPPED | OUTPATIENT
Start: 2022-08-15

## 2022-08-15 NOTE — PROGRESS NOTES
Assessment & Plan     Right shoulder pain, unspecified chronicity  - attempted to refer her to [facility] for ultrasound guided shoulder injection however there was no availability. Will refer to [facility] for further evaluation.   - XR Shoulder Right 2 Views; Future  - Orthopedic  Referral; Future  - Orthopedic  Referral; Future  - ibuprofen (ADVIL/MOTRIN) 800 MG tablet; Take 1 tablet (800 mg) by mouth every 8 hours as needed for moderate pain    Adhesive capsulitis of right shoulder  - as above   - XR Shoulder Right 2 Views; Future  - Orthopedic  Referral; Future  - Orthopedic  Referral; Future  - ibuprofen (ADVIL/MOTRIN) 800 MG tablet; Take 1 tablet (800 mg) by mouth every 8 hours as needed for moderate pain    Chronic gout of multiple sites, unspecified cause  - has had multiple gout attacks. Currently not on allopurinol- I advise following current flare up to start on low dose allopurinol.   - XR Shoulder Right 2 Views; Future  - ibuprofen (ADVIL/MOTRIN) 800 MG tablet; Take 1 tablet (800 mg) by mouth every 8 hours as needed for moderate pain  - allopurinol (ZYLOPRIM) 100 MG tablet; Take 1 tablet (100 mg) by mouth daily              See Patient Instructions    No follow-ups on file.   Follow-up Visit   Expected date:  Sep 15, 2022 (Approximate)      Follow Up Appointment Details:     Follow-up with whom?: PCP    Follow-Up for what?: Chronic Disease f/u    Chronic Disease f/u: General (Other)    Additional Details: gout    How?: In Person                    Afua Hernandez MD  [facility]    Jenny Robertson is a 72 year old, presenting for the following health issues:  Musculoskeletal Problem      History of Present Illness       Reason for visit:  Pain in right shoulder and gout flare up    She eats 2-3 servings of fruits and vegetables daily.She consumes 0 sweetened beverage(s) daily.She exercises with enough effort to increase her heart rate  10 to 19 minutes per day.    She is taking medications regularly.     Gout on the left big toe    Pain History:  When did you first notice your pain? - Acute Pain   Have you seen anyone else for your pain? Yes - urgent care twice  Where in your body do you have pain? Musculoskeletal problem/pain  Onset/Duration: a month ago  Description  Location: shoulder and hand - right  Joint Swelling: No  Redness: No  Pain: YES  Warmth: No  Intensity:  moderate, severe  Progression of Symptoms:  same  Accompanying signs and symptoms:   Fevers: No  Numbness/tingling/weakness: No  History  Trauma to the area: No  Recent illness:  No  Previous similar problem: No  Previous evaluation:  No  Precipitating or alleviating factors:  Aggravating factors include: laying down and movement   Therapies tried and outcome: heat, ice and Ibuprofen    Patient pain that causes her arm ache- she has been seen twice in the urgent care and treated with prednisone, indomethacin all with no improvement.   States its even hard to touch her face. See both urgent care notes for further details. Today, patient is desperate for answers as she unable to properly care for herself.     Over the last couple of weeks she has also had a gout flare up in her Left foot and right hand. Prednisone did help some but did not resolve it.     Review of Systems   Constitutional, HEENT, cardiovascular, pulmonary, GI, , musculoskeletal, neuro, skin, endocrine and psych systems are negative, except as otherwise noted.      Objective    /81 (BP Location: Left arm, Patient Position: Sitting, Cuff Size: Adult Large)   Pulse 71   Temp 97.6  F (36.4  C) (Oral)   Wt 88 kg (194 lb)   SpO2 100%   BMI 32.28 kg/m    Body mass index is 32.28 kg/m .  Physical Exam   GENERAL: healthy, alert and moderate distress  MS: decreased shoulder active/passive range of motion, negative neer/hawking   PSYCH: mentation appears normal, fatigued, judgement and insight intact and  appearance well groomed    SHOULDER RIGHT TWO VIEWS August 15, 2022 2:54 PM     HISTORY: Adhesive capsulitis of right shoulder. Chronic gout of  multiple sites, unspecified cause. Right shoulder pain, unspecified  chronicity.     COMPARISON: None.                                                                      IMPRESSION: No fracture. Mild degenerative changes at the glenohumeral  and acromioclavicular joints with slight widening of the AC joint.     SAKINA DURAND MD             .  ..

## 2022-09-07 ENCOUNTER — TELEPHONE (OUTPATIENT)
Dept: FAMILY MEDICINE | Facility: CLINIC | Age: 72
End: 2022-09-07

## 2022-09-07 ENCOUNTER — E-VISIT (OUTPATIENT)
Dept: FAMILY MEDICINE | Facility: CLINIC | Age: 72
End: 2022-09-07
Payer: COMMERCIAL

## 2022-09-07 DIAGNOSIS — M10.042 ACUTE IDIOPATHIC GOUT OF LEFT HAND: ICD-10-CM

## 2022-09-07 DIAGNOSIS — M1A.09X0 CHRONIC GOUT OF MULTIPLE SITES, UNSPECIFIED CAUSE: Primary | ICD-10-CM

## 2022-09-07 DIAGNOSIS — M10.9 ACUTE GOUT OF LEFT WRIST, UNSPECIFIED CAUSE: ICD-10-CM

## 2022-09-07 PROCEDURE — 99421 OL DIG E/M SVC 5-10 MIN: CPT | Performed by: FAMILY MEDICINE

## 2022-09-07 RX ORDER — METHYLPREDNISOLONE 4 MG
TABLET, DOSE PACK ORAL
Qty: 21 TABLET | Refills: 0 | Status: SHIPPED | OUTPATIENT
Start: 2022-09-07 | End: 2022-09-15

## 2022-09-07 RX ORDER — METHYLPREDNISOLONE 4 MG
TABLET, DOSE PACK ORAL
Qty: 21 TABLET | Refills: 0 | Status: CANCELLED | OUTPATIENT
Start: 2022-09-07

## 2022-09-07 RX ORDER — PREDNISONE 20 MG/1
TABLET ORAL
Qty: 20 TABLET | Refills: 0 | Status: CANCELLED | OUTPATIENT
Start: 2022-09-07

## 2022-09-07 RX ORDER — INDOMETHACIN 50 MG/1
50 CAPSULE ORAL 3 TIMES DAILY PRN
Qty: 30 CAPSULE | Refills: 0 | Status: SHIPPED | OUTPATIENT
Start: 2022-09-07 | End: 2024-04-09

## 2022-09-07 NOTE — TELEPHONE ENCOUNTER
Pt calls,  ~see E visit today, has follow up questions  ~see 8/15/22 visit with [facility]  ~has not started allopurinol yet as has still having gout flare, told to wait until flare over to start allopurinol   ~had cortisone shot in shoulder 3 weeks ago so not sure if this is why prednisone not prescribed  ~miserable  ~following typical gout diet  ~had 2 different gout prescriptions, the 20 mg prescribed in March worked great for her gout    Routed to [facility], please advise, route to inform pt of plan  Telephone Information:   Mobile [phone]     Dian Callahan, RN, BSN  [facility]

## 2022-09-07 NOTE — TELEPHONE ENCOUNTER
Provider E-Visit time total (minutes): 8 minutes    Will do TID indomethacin and increase allopurinol to 200 mg daily

## 2022-09-07 NOTE — TELEPHONE ENCOUNTER
Salud Etienne MD     Patient was given indomethacin @ urgent care visit on 7/22/22 - caused a lot of diarrhea so she did not complete this, only took a few days and stopped due to diarrhea/upset stomach did take with food any other recommendations at this time?     Message below reviewed with patient who states understanding, however has tried indomethacin in the past recently which caused bad diarrhea so could not complete course     Ana Bright, Registered Nurse  [facility]

## 2022-09-07 NOTE — TELEPHONE ENCOUNTER
LMOVM informing RX sent, f/u with pharmacy, call if ?'s or f/u in am, we are here until 6 PM  Dian Callahan RN, BSN  [facility]

## 2022-09-07 NOTE — TELEPHONE ENCOUNTER
Patient calling and states she is having gout in (L) knee.  Asking for prednisone RX.  Advised visit of some type needed.  Patient will send E-Visit.  Advised other option would be UC.  Sophia Doe RN

## 2022-09-07 NOTE — PATIENT INSTRUCTIONS
Thank you for choosing us for your care. I am placing an order for Indomethacin, which you can use up to three times per day for pain.  Make sure you take this with food as it is hard on the stomach without it.  Please let me know how much Allopurinol you are taking right now.  I see that you were started on that a few weeks ago, and we may need to increase the dose. If you are taking once tablet, increase to two.  Make sure that you are NOT taking ibuprofen, aleve, or any other anti-inflammatory while you take the indomethacin.    View your full visit summary for details by clicking on the link below. Your pharmacist will able to address any questions you may have about the medication.     If you're not feeling better within 5-7 days, please schedule an appointment.  You can schedule an appointment right here in [facility], or call [phone]  If the visit is for the same symptoms as your eVisit, we'll refund the cost of your eVisit if seen within seven days.

## 2022-09-07 NOTE — TELEPHONE ENCOUNTER
Indomethacin or Colchicine are preferred over steroids for treatment of gout flares.  Colchicine is often expensive, so I go with Indomethacin first.  I use steroids only if these two fail, or if the patient is not able to take them.    Have her start the Allopurinol at 100 mg daily when her flare is over as advised.  If she is not improving after 5 days, have her call us and we can try steroids then.

## 2022-09-12 ENCOUNTER — NURSE TRIAGE (OUTPATIENT)
Dept: FAMILY MEDICINE | Facility: CLINIC | Age: 72
End: 2022-09-12

## 2022-09-12 NOTE — TELEPHONE ENCOUNTER
"S: Blood on tissue after urination    B: Today is last day of Medrol dose pack today.  Last dose of ibuprofen was 5 days ago.    3 years ago had same symptoms. Was taking a lot of ibuprofen for gout at the time.  Had extensive work up which was negative.    A: Bright red blood on tissue \"a little tiny spot\" \"maybe one or two drops\".    Saw it 3 times two days ago and 2 times yesterday. Nothing today.    Denies: urinary/vaginal symptoms; abdominal pain, flank or back pain, fever, injury    R: appointment 9/15/22    Additional Information    Negative: Recent back or abdominal injury    Negative: Recent genital injury    Negative: Unable to urinate (or only a few drops) > 4 hours and bladder feels very full (e.g., palpable bladder or strong urge to urinate)    Negative: Passing pure blood or large blood clots (i.e., larger than a dime or grape)    Negative: Urinary catheter, questions about    Negative: Shock suspected (e.g., cold/pale/clammy skin, too weak to stand, low BP, rapid pulse)    Negative: Sounds like a life-threatening emergency to the triager    Negative: Fever > 100.4 F (38.0 C)    Negative: Patient sounds very sick or weak to the triager    Protocols used: URINE - BLOOD IN-A-OH      "

## 2022-09-13 NOTE — TELEPHONE ENCOUNTER
Reason for Disposition    All other patients with blood in urine  (Exception: Could be normal menstrual bleeding.)    Additional Information    Negative: Known sickle cell disease    Negative: Taking Coumadin (warfarin) or other strong blood thinner, or known bleeding disorder (e.g., thrombocytopenia)    Negative: Side (flank) or back pain present    Negative: Pain or burning with passing urine (urination)    Negative: Patient wants to be seen    Negative: Pink or red-colored urine and likely from food (beets, rhubarb, red food dye) and lasts > 24 hours after stopping food    Protocols used: URINE - BLOOD IN-A-OH

## 2022-09-15 ENCOUNTER — OFFICE VISIT (OUTPATIENT)
Dept: FAMILY MEDICINE | Facility: CLINIC | Age: 72
End: 2022-09-15
Payer: COMMERCIAL

## 2022-09-15 VITALS
HEART RATE: 82 BPM | TEMPERATURE: 98.7 F | SYSTOLIC BLOOD PRESSURE: 130 MMHG | BODY MASS INDEX: 31.61 KG/M2 | HEIGHT: 65 IN | DIASTOLIC BLOOD PRESSURE: 80 MMHG | RESPIRATION RATE: 16 BRPM | OXYGEN SATURATION: 97 % | WEIGHT: 189.7 LBS

## 2022-09-15 DIAGNOSIS — E78.5 HYPERLIPIDEMIA, UNSPECIFIED HYPERLIPIDEMIA TYPE: ICD-10-CM

## 2022-09-15 DIAGNOSIS — N95.0 POST-MENOPAUSAL BLEEDING: ICD-10-CM

## 2022-09-15 DIAGNOSIS — Z12.11 SCREEN FOR COLON CANCER: ICD-10-CM

## 2022-09-15 DIAGNOSIS — N93.9 VAGINAL BLEEDING: ICD-10-CM

## 2022-09-15 DIAGNOSIS — M75.01 ADHESIVE CAPSULITIS OF RIGHT SHOULDER: ICD-10-CM

## 2022-09-15 DIAGNOSIS — R31.9 HEMATURIA, UNSPECIFIED TYPE: Primary | ICD-10-CM

## 2022-09-15 LAB
ALBUMIN UR-MCNC: NEGATIVE MG/DL
APPEARANCE UR: CLEAR
BACTERIA #/AREA URNS HPF: ABNORMAL /HPF
BILIRUB UR QL STRIP: NEGATIVE
COLOR UR AUTO: YELLOW
GLUCOSE UR STRIP-MCNC: NEGATIVE MG/DL
HGB UR QL STRIP: ABNORMAL
KETONES UR STRIP-MCNC: NEGATIVE MG/DL
LEUKOCYTE ESTERASE UR QL STRIP: NEGATIVE
NITRATE UR QL: NEGATIVE
PH UR STRIP: 5.5 [PH] (ref 5–7)
RBC #/AREA URNS AUTO: ABNORMAL /HPF
SP GR UR STRIP: <=1.005 (ref 1–1.03)
SQUAMOUS #/AREA URNS AUTO: ABNORMAL /LPF
UROBILINOGEN UR STRIP-ACNC: 0.2 E.U./DL
WBC #/AREA URNS AUTO: ABNORMAL /HPF

## 2022-09-15 PROCEDURE — 81001 URINALYSIS AUTO W/SCOPE: CPT | Performed by: PHYSICIAN ASSISTANT

## 2022-09-15 PROCEDURE — 80053 COMPREHEN METABOLIC PANEL: CPT | Performed by: PHYSICIAN ASSISTANT

## 2022-09-15 PROCEDURE — 36415 COLL VENOUS BLD VENIPUNCTURE: CPT | Performed by: PHYSICIAN ASSISTANT

## 2022-09-15 PROCEDURE — 82248 BILIRUBIN DIRECT: CPT | Performed by: PHYSICIAN ASSISTANT

## 2022-09-15 PROCEDURE — 80061 LIPID PANEL: CPT | Performed by: PHYSICIAN ASSISTANT

## 2022-09-15 PROCEDURE — 99213 OFFICE O/P EST LOW 20 MIN: CPT | Performed by: PHYSICIAN ASSISTANT

## 2022-09-15 NOTE — PROGRESS NOTES
Assessment & Plan     Hematuria, unspecified type  UA shows no significant blood. Concern for possible vaginal bleeding present thus ultrasound and referral to OB/GYN placed.  Patient concerned about kidney function. BMP ordered. She would prefer a call with lab results.  Patient denies any rectal bleeding.  - UA Macro with Reflex to Micro and Culture - lab collect; Future  - UA Macro with Reflex to Micro and Culture - lab collect  - Urine Microscopic  - Basic metabolic panel  (Ca, Cl, CO2, Creat, Gluc, K, Na, BUN); Future  - Basic metabolic panel  (Ca, Cl, CO2, Creat, Gluc, K, Na, BUN)    Vaginal bleeding  As noted above  - US Pelvic Complete with Transvaginal; Future  - Ob/Gyn Referral; Future    Post-menopausal bleeding  As noted above  - US Pelvic Complete with Transvaginal; Future  - Ob/Gyn Referral; Future    Screen for colon cancer  Offered to order screening. Patient declines.    Hyperlipidemia, unspecified hyperlipidemia type  Patient would like to have cholesterol checked. She has not started the atorvastatin yet. She has been trying other measures to lower cholesterol.  - Lipid panel reflex to direct LDL Fasting  - Hepatic panel (Albumin, ALT, AST, Bili, Alk Phos, TP)    Adhesive capsulitis of right shoulder  Seeing [facility] who have scheduled a MRI.      Review of the result(s) of each unique test - UA  Ordering of each unique test  Prescription drug management    SANDRA Kohli [facility]   Ailyn is a 72 year old, presenting for the following health issues:  Urinary Problem      HPI     Genitourinary - Female  Onset/Duration: 5 days  Description:   Painful urination (Dysuria): No           Frequency: No  Blood in urine (Hematuria): YES- just a drop (when wiping)  Delay in urine (Hesitency): No  Intensity: mild  Progression of Symptoms:  same  Accompanying Signs & Symptoms:  Fever/chills: No  Flank pain: No  Nausea and vomiting: No  Vaginal symptoms:  "none  Abdominal/Pelvic Pain: No  History:   History of frequent UTI s: No  History of kidney stones: YES  Sexually Active: No  Possibility of pregnancy: No  Precipitating or alleviating factors: None  Therapies tried and outcome:  none     Pain History:  When did you first notice your pain? - Acute Pain   Have you seen anyone else for your pain? Yes - [facility]- getting an MRI  Where in your body do you have pain? Musculoskeletal problem/pain  Onset/Duration: 2 months  Description  Location:  shoulder - right  Joint Swelling: YES- in right  shoulder  Redness: No  Pain: YES  Warmth: yes  Intensity:  moderate, severe  Progression of Symptoms:  worsening  Accompanying signs and symptoms:   Fevers: No  Numbness/tingling/weakness: No  History  Trauma to the area: No  Recent illness:  No  Previous similar problem: No  Previous evaluation:  YES- frozen shoulder  Precipitating or alleviating factors:  Aggravating factors include: lifting and overuse  Therapies tried and outcome: heat and ice    She has been having gout flares. She is due to start allopurinol but has not been able to until she has her gout flares under control.      Review of Systems   GENERAL:  No fevers  MUSCULOSKELETAL: As noted in HPI          Objective    /80 (BP Location: Right arm, Patient Position: Chair, Cuff Size: Adult Large)   Pulse 82   Temp 98.7  F (37.1  C) (Oral)   Resp 16   Ht 1.651 m (5' 5\")   Wt 86 kg (189 lb 11.2 oz)   SpO2 97%   BMI 31.57 kg/m    Body mass index is 31.57 kg/m .  Physical Exam   GENERAL: No acute distress  HEENT: Normocephalic  NEURO: Alert and non-focal    Results for orders placed or performed in visit on 09/15/22 (from the past 24 hour(s))   UA Macro with Reflex to Micro and Culture - lab collect    Specimen: Urine, Clean Catch   Result Value Ref Range    Color Urine Yellow Colorless, Straw, Light Yellow, Yellow    Appearance Urine Clear Clear    Glucose Urine Negative Negative mg/dL    Bilirubin Urine Negative " Negative    Ketones Urine Negative Negative mg/dL    Specific Gravity Urine <=1.005 1.003 - 1.035    Blood Urine Small (A) Negative    pH Urine 5.5 5.0 - 7.0    Protein Albumin Urine Negative Negative mg/dL    Urobilinogen Urine 0.2 0.2, 1.0 E.U./dL    Nitrite Urine Negative Negative    Leukocyte Esterase Urine Negative Negative   Urine Microscopic   Result Value Ref Range    Bacteria Urine Few (A) None Seen /HPF    RBC Urine 0-2 0-2 /HPF /HPF    WBC Urine 0-5 0-5 /HPF /HPF    Squamous Epithelials Urine Few (A) None Seen /LPF    Narrative    Urine Culture not indicated

## 2022-09-17 LAB
ALBUMIN SERPL-MCNC: 3.4 G/DL (ref 3.4–5)
ALP SERPL-CCNC: 52 U/L (ref 40–150)
ALT SERPL W P-5'-P-CCNC: 23 U/L (ref 0–50)
ANION GAP SERPL CALCULATED.3IONS-SCNC: 9 MMOL/L (ref 3–14)
AST SERPL W P-5'-P-CCNC: 17 U/L (ref 0–45)
BILIRUB DIRECT SERPL-MCNC: 0.2 MG/DL (ref 0–0.2)
BILIRUB SERPL-MCNC: 1.1 MG/DL (ref 0.2–1.3)
BUN SERPL-MCNC: 31 MG/DL (ref 7–30)
CALCIUM SERPL-MCNC: 9.4 MG/DL (ref 8.5–10.1)
CHLORIDE BLD-SCNC: 102 MMOL/L (ref 94–109)
CHOLEST SERPL-MCNC: 204 MG/DL
CO2 SERPL-SCNC: 25 MMOL/L (ref 20–32)
CREAT SERPL-MCNC: 0.89 MG/DL (ref 0.52–1.04)
FASTING STATUS PATIENT QL REPORTED: YES
GFR SERPL CREATININE-BSD FRML MDRD: 69 ML/MIN/1.73M2
GLUCOSE BLD-MCNC: 105 MG/DL (ref 70–99)
HDLC SERPL-MCNC: 66 MG/DL
LDLC SERPL CALC-MCNC: 122 MG/DL
NONHDLC SERPL-MCNC: 138 MG/DL
POTASSIUM BLD-SCNC: 3.7 MMOL/L (ref 3.4–5.3)
PROT SERPL-MCNC: 7.8 G/DL (ref 6.8–8.8)
SODIUM SERPL-SCNC: 136 MMOL/L (ref 133–144)
TRIGL SERPL-MCNC: 78 MG/DL

## 2022-09-19 ENCOUNTER — TELEPHONE (OUTPATIENT)
Dept: FAMILY MEDICINE | Facility: CLINIC | Age: 72
End: 2022-09-19

## 2022-09-19 NOTE — TELEPHONE ENCOUNTER
----- Message from Key Gaines PA-C sent at 9/19/2022  7:32 AM CDT -----  Please call patient to let her know that her kidney function is normal.

## 2022-09-19 NOTE — TELEPHONE ENCOUNTER
Called pt and relayed provider message below. Patient was given an opportunity to ask questions, verbalized understanding of plan, and is agreeable.    Cecilia Ricks RN

## 2022-10-11 ENCOUNTER — ANCILLARY PROCEDURE (OUTPATIENT)
Dept: ULTRASOUND IMAGING | Facility: CLINIC | Age: 72
End: 2022-10-11
Attending: PHYSICIAN ASSISTANT
Payer: COMMERCIAL

## 2022-10-11 DIAGNOSIS — N95.0 POST-MENOPAUSAL BLEEDING: ICD-10-CM

## 2022-10-11 DIAGNOSIS — N93.9 VAGINAL BLEEDING: ICD-10-CM

## 2022-10-11 PROCEDURE — 76856 US EXAM PELVIC COMPLETE: CPT | Performed by: OBSTETRICS & GYNECOLOGY

## 2022-10-11 PROCEDURE — 76830 TRANSVAGINAL US NON-OB: CPT | Performed by: OBSTETRICS & GYNECOLOGY

## 2022-10-12 ENCOUNTER — TELEPHONE (OUTPATIENT)
Dept: FAMILY MEDICINE | Facility: CLINIC | Age: 72
End: 2022-10-12

## 2022-10-12 NOTE — TELEPHONE ENCOUNTER
----- Message from Key Gaines PA-C sent at 10/12/2022  8:01 AM CDT -----  Please call patient and let her know that her ultrasound shows no signs of a thickened endometrial lining. Has she had any further bleeding? The OB/GYN that read the ultrasound states if bleeding continues then MRI could be obtained to further evaluate. I would still recommend a follow up with OB/GYN as well (it does not look like this has been scheduled yet).

## 2022-10-12 NOTE — TELEPHONE ENCOUNTER
Called patient and advised of below.  No further bleeding since 3-4 weeks ago when had the 4 days of light bleeding.  Did advise to schedule OB/GYN visit.  Patient agrees to call to schedule appointment.  Sophia Doe RN

## 2023-01-20 DIAGNOSIS — I10 BENIGN ESSENTIAL HYPERTENSION: ICD-10-CM

## 2023-01-23 RX ORDER — TRIAMTERENE AND HYDROCHLOROTHIAZIDE 37.5; 25 MG/1; MG/1
CAPSULE ORAL
Qty: 90 CAPSULE | Refills: 3 | OUTPATIENT
Start: 2023-01-23

## 2023-01-23 NOTE — TELEPHONE ENCOUNTER
Already approved triamterene-HCTZ (DYAZIDE) 37.5-25 MG capsule (90 capsules with 3 refills on 3/21/2022). Patient requesting refill too soon.     Em ABBASI RN   [facility]

## 2023-01-25 ENCOUNTER — NURSE TRIAGE (OUTPATIENT)
Dept: FAMILY MEDICINE | Facility: CLINIC | Age: 73
End: 2023-01-25
Payer: COMMERCIAL

## 2023-01-25 DIAGNOSIS — R05.9 COUGH: ICD-10-CM

## 2023-01-25 DIAGNOSIS — U07.1 INFECTION DUE TO 2019 NOVEL CORONAVIRUS: ICD-10-CM

## 2023-01-25 PROCEDURE — 99207 PR NO CHARGE LOS: CPT | Mod: CS | Performed by: FAMILY MEDICINE

## 2023-01-25 NOTE — TELEPHONE ENCOUNTER
RN COVID TREATMENT VISIT  01/25/23    Ailyntavon Beck  72 year old  Current weight? 190#    Has the patient been seen by a primary care provider at an [facility] or [facility] within the past two years? Yes.   Have you been in close proximity to/do you have a known exposure to a person with a confirmed case of influenza? No.     Date of positive COVID test (PCR or at home)?  1/25/2023 home test    Current COVID symptoms: fever or chills, cough and congestion or runny nose    Date COVID symptoms began: 2d ago    Do you have any of the following conditions that place you at risk of being very sick from COVID-19? 65 years or older and chronic kidney disease  .bmi  Is patient eligible to continue? Yes, established patient, 12 years or older weighing at least 88.2 lbs, who has COVID symptoms that started in the past 5 days and is at risk for being very sick from COVID-19.       Have you received monoclonal antibodies or oral antiviral medications since testing positive to COVID-19? No    Are you currently hospitalized for COVID-19? No    Do you have a history of hepatitis? No    Are you currently pregnant or nursing? No    Do you have a clinically significant hypersensitivity to nirmatrelvir, ritonavir, or molnupiravir? No    Do you have any history of severe renal impairment (eGFR < 30mL/min)? No   GFR Estimate   Date Value Ref Range Status   09/15/2022 69 >60 mL/min/1.73m2 Final     Comment:     Effective December 21, 2021 eGFRcr in adults is calculated using the 2021 CKD-EPI creatinine equation which includes age and gender ( et al., NEJM, DOI: 10.1056/YJEHuw8311884)   03/14/2022 54 (L) >60 mL/min/1.73m2 Final     Comment:     Effective December 21, 2021 eGFRcr in adults is calculated using the 2021 CKD-EPI creatinine equation which includes age and gender (Napoleon et al., NEJM, DOI: 10.1056/TACBpp5325261)   03/04/2022 77 >60 mL/min/1.73m2 Final     Comment:     Effective December 21,  2021 eGFRcr in adults is calculated using the 2021 CKD-EPI creatinine equation which includes age and gender (Napoleon et al., NE, DOI: 10.1056/DCAPgi1318074)   05/19/2021 62 >60 mL/min/[1.73_m2] Final     Comment:     Non  GFR Calc  Starting 12/18/2018, serum creatinine based estimated GFR (eGFR) will be   calculated using the Chronic Kidney Disease Epidemiology Collaboration   (CKD-EPI) equation.     03/08/2021 51 (L) >60 mL/min/[1.73_m2] Final     Comment:     Non  GFR Calc  Starting 12/18/2018, serum creatinine based estimated GFR (eGFR) will be   calculated using the Chronic Kidney Disease Epidemiology Collaboration   (CKD-EPI) equation.     02/07/2020 65 >60 mL/min/[1.73_m2] Final     Comment:     Non  GFR Calc  Starting 12/18/2018, serum creatinine based estimated GFR (eGFR) will be   calculated using the Chronic Kidney Disease Epidemiology Collaboration   (CKD-EPI) equation.     03/20/2011 54 (L) >60 mL/min/1.7m2 Final   03/14/2011 55 (L) >60 mL/min/1.7m2 Final     GFR Estimate If Black   Date Value Ref Range Status   05/19/2021 72 >60 mL/min/[1.73_m2] Final     Comment:      GFR Calc  Starting 12/18/2018, serum creatinine based estimated GFR (eGFR) will be   calculated using the Chronic Kidney Disease Epidemiology Collaboration   (CKD-EPI) equation.     03/08/2021 59 (L) >60 mL/min/[1.73_m2] Final     Comment:      GFR Calc  Starting 12/18/2018, serum creatinine based estimated GFR (eGFR) will be   calculated using the Chronic Kidney Disease Epidemiology Collaboration   (CKD-EPI) equation.     02/07/2020 75 >60 mL/min/[1.73_m2] Final     Comment:      GFR Calc  Starting 12/18/2018, serum creatinine based estimated GFR (eGFR) will be   calculated using the Chronic Kidney Disease Epidemiology Collaboration   (CKD-EPI) equation.     03/20/2011 65 >60 mL/min/1.7m2 Final   03/14/2011 67 >60 mL/min/1.7m2 Final          Do you have any history of hepatic impairment or abnormalities (e.g. hepatic panel, ALT, AST, ALK Phos, bilirubin)? No    Have you had a coronary stent placed in the previous 6 months? No    Is patient eligible to continue?   Yes, patient meets all eligibility requirements for the RN COVID treatment (as denoted by all no responses above).     Current Outpatient Medications   Medication Sig Dispense Refill     allopurinol (ZYLOPRIM) 100 MG tablet Take 1 tablet (100 mg) by mouth daily 90 tablet 0     atenolol (TENORMIN) 25 MG tablet Take 1 tablet (25 mg) by mouth daily 90 tablet 3     atorvastatin (LIPITOR) 20 MG tablet Take 1 tablet (20 mg) by mouth daily 90 tablet 1     ibuprofen (ADVIL/MOTRIN) 800 MG tablet Take 1 tablet (800 mg) by mouth every 8 hours as needed for moderate pain 90 tablet 0     indomethacin (INDOCIN) 50 MG capsule Take 1 capsule (50 mg) by mouth 3 times daily as needed for moderate pain . Take with food. 30 capsule 0     Multiple Vitamins-Minerals (MULTIVITAMIN GUMMIES WOMENS) CHEW Take 1 chew tab by mouth every evening       triamterene-HCTZ (DYAZIDE) 37.5-25 MG capsule Take 1 capsule by mouth every evening 90 capsule 3       Medications from List 1 of the standing order (on medications that exclude the use of Paxlovid) that patient is taking: NONE. Is patient taking Deepa's Wort? No  Is patient taking Deepa's Wort or any meds from List 1? No.   Medications from List 2 of the standing order (on meds that provider needs to adjust) that patient is taking: NONE. Is patient on any of the meds from List 2? No.   Medications from List 3 of standing order (on meds that a RN needs to adjust) that patient is taking: atorvastatin (Lipitor): Instructed patient to stop atorvastatin while taking Paxlovid and restart atorvastatin 1 day after the completion of Paxlovid.  Is patient on any meds from List 3? Yes. Patient is on meds from list 3. No meds require a provider visit and at least one med  required RN to adjust.   In order of efficacy, Paxlovid has an approximate 90% reduction in hospitalization. Paxlovid can possibly cause altered sense of taste, diarrhea (loose, watery stools), high blood pressure, muscle aches.      Which treatment option does the patient prefer?   Paxlovid.   Lab Results   Component Value Date    GFRESTIMATED 69 09/15/2022       Was last eGFR reduced? Yes, eGFR 30-59 and will require reduced renal function dose of Paxlovid. Prescription sent to [facility].   [facility] [phone] [address] [place] [address]    Hours:  Mon-Fri: 8:00a - 5:00p     Drive Thru available    Temporary change to home medications: stop atorvastatin until one day after finished with Paxlovid.    All medication adjustments (holds, etc) were discussed with the patient and patient was asked to repeat back (teachback) their med adjustment.  Did patient understand med adjustment? Yes, patient repeated back and understood correctly.        Reviewed the following instructions with the patient:    Paxlovid (nimatrelvir and ritonavir)    How it works  Two medicines (nirmatrelvir and ritonavir) are taken together. They stop the virus from growing. Less amount of virus is easier for your body to fight.    How to take    Medicine comes in a daily container with both medicine tablets. Take by mouth twice daily (once in the morning, once at night) for 5 days.    The number of tablets to take varies by patient.    Don't chew or break capsules. Swallow whole.    When to take  Take as soon as possible after positive COVID-19 test result, and within 5 days of your first symptoms.    Possible side effects  Can cause altered sense of taste, diarrhea (loose, watery stools), high blood pressure, muscle aches.    Daysi Campoverde, RN

## 2023-01-25 NOTE — PATIENT INSTRUCTIONS
COVID-19 Outpatient Treatments  Your care team can help you find the best treatments for COVID-19. Talk to a health care provider or refer to the FDA medicine fact sheets below.    Important: You can't have Paxlovid or molnupiravir if you're starting the medicine more than 5 days after your symptoms have started.  Paxlovid: https://www.fda.gov/media/011909/download  Molnupiravir (Lagevrio): https://www.fda.gov/media/923478/download  Paxlovid (nimatrelvir and ritonavir)  How it works  Two medicines (nirmatrelvir and ritonavir) are taken together. They stop the virus from growing. Less amount of virus is easier for your body to fight.  Benefits  Lowers risk of a hospital stay or death from COVID-19.  How to take    Medicine comes in a daily container with both medicine tablets. Take by mouth twice daily (once in the morning, once at night) for 5 days.    The number of tablets to take varies by patient.    Don't chew or break capsules. Swallow whole.  When to take  Take as soon as possible after positive COVID-19 test result, and within 5 days of your first symptoms.  Who can take it  Patients must be 12 years or older, weigh at least 88 pounds, and have tested positive for COVID-19. Paxlovid is the preferred treatment for pregnant patients.  Possible side effects  Can cause altered sense of taste, diarrhea (loose, watery stools), high blood pressure, muscle aches.  Medicine conflicts    Some medicines may conflict with Paxlovid and may cause serious side effects.    Tell your care team about all the medicines you take, including prescription and over-the-counter medicines, vitamins, and herbal supplements.    Your care team will review your medicines to make sure that you can safely take Paxlovid.  Cautions    Paxlovid is not advised for patients with severe kidney or liver disease. If you have kidney or liver problems, the dose may need to be changed.    If you're pregnant or breastfeeding, talk to your care team  about your options.    If you take hormonal birth control (such as the Pill), then you or your partner should also use a non-hormonal form of birth control (such as a condom). Keep doing this for 1 menstrual cycle after your last dose of Paxlovid.  Molnupiravir (Lagevrio)  How it works  Stops the virus from growing. Less amount of virus is easier for your body to fight.  Benefits  Lowers risk of a hospital stay or death from COVID-19.  How to take  Take 4 capsules by mouth every 12 hours (4 in the morning and 4 at night) for 5 days. Don't chew or break capsules. Swallow whole.  When to take  Take as soon as possible after positive COVID-19 test result, and within 5 days of your first symptoms.  Who can take it  Patients must be 18 years or older and have tested positive for COVID-19.  Possible side effects  Diarrhea (loose, watery stools), nausea (feeling sick to your stomach), dizziness, headaches.  Medicine conflicts  Right now, there are no known conflicts with other drugs. But tell your care team about all medicines you take.  Cautions    This medicine is not advised for patients who are pregnant.    If you are someone who could become pregnant, use trusted birth control until 4 days after your last dose of molnupiravir.    If your partner could become pregnant, you should use trusted birth control until 3 months after your last dose of molnupiravir.  For informational purposes only. Not to replace the advice of your health care provider. Copyright   2022 [facility]. All rights reserved. Clinically reviewed by Gisselle Carlson, PharmD, BCACP. [facility] [id number] - REV 12/22.

## 2023-01-25 NOTE — TELEPHONE ENCOUNTER
S: Positive Covid home test 12:30 today.  B: Went to a wedding 4d ago  A: Dry, scratchy Cough, sore throat, chills, afebrile,   Symptoms started 1d ago, head congestion, nasal congestion,     Moderna vaccine x2 and one Moderna booster.    Denies: shortness of breath, wheezing, asthma, heart or lung disease, weak immune system, fatigue, headache, loss of smell or taste, muscle pain, sore throat    R: home care treatment and will do RN protocol for Covid treatment.  Daysi Campoverde RN     Reason for Disposition    [1] COVID-19 diagnosed by positive lab test (e.g., PCR, rapid self-test kit) AND [2] mild symptoms (e.g., cough, fever, others) AND [3] no complications or SOB    Additional Information    Negative: SEVERE difficulty breathing (e.g., struggling for each breath, speaks in single words)    Negative: Difficult to awaken or acting confused (e.g., disoriented, slurred speech)    Negative: Bluish (or gray) lips or face now    Negative: Shock suspected (e.g., cold/pale/clammy skin, too weak to stand, low BP, rapid pulse)    Negative: Sounds like a life-threatening emergency to the triager    Negative: [1] Diagnosed or suspected COVID-19 AND [2] symptoms lasting 3 or more weeks    Negative: [1] COVID-19 exposure AND [2] no symptoms    Negative: COVID-19 vaccine reaction suspected (e.g., fever, headache, muscle aches) occurring 1 to 3 days after getting vaccine    Negative: COVID-19 vaccine, questions about    Negative: [1] Lives with someone known to have influenza (flu test positive) AND [2] flu-like symptoms (e.g., cough, runny nose, sore throat, SOB; with or without fever)    Negative: [1] Adult with possible COVID-19 symptoms AND [2] triager concerned about severity of symptoms or other causes    Negative: COVID-19 and breastfeeding, questions about    Negative: SEVERE or constant chest pain or pressure  (Exception: Mild central chest pain, present only when coughing.)    Negative: MODERATE difficulty  breathing (e.g., speaks in phrases, SOB even at rest, pulse 100-120)    Negative: Headache and stiff neck (can't touch chin to chest)    Negative: Oxygen level (e.g., pulse oximetry) 90 percent or lower    Negative: Chest pain or pressure  (Exception: MILD central chest pain, present only when coughing)    Negative: Patient sounds very sick or weak to the triager    Negative: [1] HIGH RISK patient AND [2] influenza is widespread in the community AND [3] ONE OR MORE respiratory symptoms: cough, sore throat, runny or stuffy nose    Negative: [1] HIGH RISK for severe COVID complications (e.g., weak immune system, age > 64 years, obesity with BMI of 30 or higher, pregnant, chronic lung disease or other chronic medical condition) AND [2] COVID symptoms (e.g., cough, fever)  (Exceptions: Already seen by PCP and no new or worsening symptoms.)    Negative: [1] Fever > 100.0 F (37.8 C) AND [2] bedridden (e.g., nursing home patient, CVA, chronic illness, recovering from surgery)    Negative: [1] Fever > 101 F (38.3 C) AND [2] over 60 years of age    Negative: Fever > 103 F (39.4 C)    Negative: MILD difficulty breathing (e.g., minimal/no SOB at rest, SOB with walking, pulse <100)    Negative: [1] COVID-19 infection suspected by caller or triager AND [2] mild symptoms (cough, fever, or others) AND [3] negative COVID-19 rapid test    Negative: Fever present > 3 days (72 hours)    Negative: [1] Fever returns after gone for over 24 hours AND [2] symptoms worse or not improved    Negative: [1] Continuous (nonstop) coughing interferes with work or school AND [2] no improvement using cough treatment per Care Advice    Negative: Cough present > 3 weeks    Negative: [1] COVID-19 diagnosed by positive lab test (e.g., PCR, rapid self-test kit) AND [2] NO symptoms (e.g., cough, fever, others)    Protocols used: CORONAVIRUS (COVID-19) DIAGNOSED OR [id number]

## 2023-03-10 DIAGNOSIS — I10 BENIGN ESSENTIAL HYPERTENSION: ICD-10-CM

## 2023-03-13 RX ORDER — ATENOLOL 25 MG/1
TABLET ORAL
Qty: 90 TABLET | Refills: 1 | Status: SHIPPED | OUTPATIENT
Start: 2023-03-13 | End: 2023-08-23

## 2023-03-13 NOTE — TELEPHONE ENCOUNTER
Prescription approved per [facility] Refill Protocol.    Will need appointment in August 2023  Daysi Campoverde RN

## 2023-05-27 ENCOUNTER — HEALTH MAINTENANCE LETTER (OUTPATIENT)
Age: 73
End: 2023-05-27

## 2023-08-14 ENCOUNTER — TELEPHONE (OUTPATIENT)
Dept: FAMILY MEDICINE | Facility: CLINIC | Age: 73
End: 2023-08-14
Payer: COMMERCIAL

## 2023-08-14 NOTE — TELEPHONE ENCOUNTER
Patient Quality Outreach    Patient is due for the following:   Colon Cancer Screening  Physical Annual Wellness Visit      Topic Date Due    COVID-19 Vaccine (5 - Moderna series) 09/23/2022       Next Steps:   Schedule a Annual Wellness Visit    Type of outreach:    Sent [employer] message.    Next Steps:  Reach out within 90 days via Letter.    Max number of attempts reached: No. Will try again in 90 days if patient still on fail list.    Questions for provider review:    None           Dhaval La CMA        Patient Quality Outreach Summary      Summary:    Patient is due/failing the following:   Colonoscopy, Annual wellness, date due: 03/31/23  Health Maintenance Due   Topic    Flu Vaccine (1)       Type of outreach:    Chart review performed, no outreach needed. and patient is scheduled for 10/04/23    Questions for provider review:    None                                                                                                                    Dhaval La CMA on 9/1/2023 at 2:04 PM

## 2023-08-22 DIAGNOSIS — I10 BENIGN ESSENTIAL HYPERTENSION: ICD-10-CM

## 2023-08-23 RX ORDER — ATENOLOL 25 MG/1
TABLET ORAL
Qty: 90 TABLET | Refills: 0 | Status: SHIPPED | OUTPATIENT
Start: 2023-08-23 | End: 2023-10-04

## 2023-08-23 NOTE — TELEPHONE ENCOUNTER
TC:    LOV was on 9/15/22. No future appointment in place. MA sent [facility] message on 8/14 advising appointment for AWV, but pt haven't read the [facility] message yet. So, please call pt to help schedule an appointment. Thanks.     Refilled 3 months supply.    Tiara RN  Patient Advocate Liason (PAL)  [facility]  Ph. [phone] / Fax. [phone]

## 2023-08-24 DIAGNOSIS — I10 BENIGN ESSENTIAL HYPERTENSION: ICD-10-CM

## 2023-08-25 RX ORDER — TRIAMTERENE AND HYDROCHLOROTHIAZIDE 37.5; 25 MG/1; MG/1
1 CAPSULE ORAL EVERY EVENING
Qty: 90 CAPSULE | Refills: 0 | Status: SHIPPED | OUTPATIENT
Start: 2023-08-25 | End: 2023-10-20

## 2023-08-25 NOTE — TELEPHONE ENCOUNTER
Medication is being filled for 1 time refill only due to:  Patient needs to be seen because it has been more than one year since last visit.    Em refill sent. Pt is already scheduled with April [facility] for 10/04/23    Prescription approved per [facility] Refill Protocol.  Cecilia EVANS RN

## 2023-10-04 ENCOUNTER — OFFICE VISIT (OUTPATIENT)
Dept: FAMILY MEDICINE | Facility: CLINIC | Age: 73
End: 2023-10-04
Payer: COMMERCIAL

## 2023-10-04 VITALS
SYSTOLIC BLOOD PRESSURE: 132 MMHG | HEART RATE: 70 BPM | DIASTOLIC BLOOD PRESSURE: 80 MMHG | HEIGHT: 65 IN | OXYGEN SATURATION: 98 % | WEIGHT: 197.2 LBS | TEMPERATURE: 98.1 F | RESPIRATION RATE: 16 BRPM | BODY MASS INDEX: 32.86 KG/M2

## 2023-10-04 DIAGNOSIS — Z12.11 COLON CANCER SCREENING: ICD-10-CM

## 2023-10-04 DIAGNOSIS — Z00.00 ROUTINE GENERAL MEDICAL EXAMINATION AT A HEALTH CARE FACILITY: Primary | ICD-10-CM

## 2023-10-04 DIAGNOSIS — I10 BENIGN ESSENTIAL HYPERTENSION: ICD-10-CM

## 2023-10-04 DIAGNOSIS — N18.31 STAGE 3A CHRONIC KIDNEY DISEASE (H): ICD-10-CM

## 2023-10-04 DIAGNOSIS — Z78.0 POST-MENOPAUSAL: ICD-10-CM

## 2023-10-04 DIAGNOSIS — M1A.09X0 CHRONIC GOUT OF MULTIPLE SITES, UNSPECIFIED CAUSE: ICD-10-CM

## 2023-10-04 DIAGNOSIS — E78.5 HYPERLIPIDEMIA, UNSPECIFIED HYPERLIPIDEMIA TYPE: ICD-10-CM

## 2023-10-04 DIAGNOSIS — R73.01 ELEVATED FASTING GLUCOSE: ICD-10-CM

## 2023-10-04 LAB
ERYTHROCYTE [DISTWIDTH] IN BLOOD BY AUTOMATED COUNT: 15.5 % (ref 10–15)
HBA1C MFR BLD: 5.4 % (ref 0–5.6)
HCT VFR BLD AUTO: 41 % (ref 35–47)
HGB BLD-MCNC: 12.7 G/DL (ref 11.7–15.7)
MCH RBC QN AUTO: 26.2 PG (ref 26.5–33)
MCHC RBC AUTO-ENTMCNC: 31 G/DL (ref 31.5–36.5)
MCV RBC AUTO: 85 FL (ref 78–100)
PLATELET # BLD AUTO: 347 10E3/UL (ref 150–450)
RBC # BLD AUTO: 4.84 10E6/UL (ref 3.8–5.2)
WBC # BLD AUTO: 8.5 10E3/UL (ref 4–11)

## 2023-10-04 PROCEDURE — 82306 VITAMIN D 25 HYDROXY: CPT | Performed by: FAMILY MEDICINE

## 2023-10-04 PROCEDURE — 36415 COLL VENOUS BLD VENIPUNCTURE: CPT | Performed by: FAMILY MEDICINE

## 2023-10-04 PROCEDURE — 80053 COMPREHEN METABOLIC PANEL: CPT | Performed by: FAMILY MEDICINE

## 2023-10-04 PROCEDURE — 85027 COMPLETE CBC AUTOMATED: CPT | Performed by: FAMILY MEDICINE

## 2023-10-04 PROCEDURE — 99397 PER PM REEVAL EST PAT 65+ YR: CPT | Performed by: FAMILY MEDICINE

## 2023-10-04 PROCEDURE — 80061 LIPID PANEL: CPT | Performed by: FAMILY MEDICINE

## 2023-10-04 PROCEDURE — 84550 ASSAY OF BLOOD/URIC ACID: CPT | Performed by: FAMILY MEDICINE

## 2023-10-04 PROCEDURE — 83036 HEMOGLOBIN GLYCOSYLATED A1C: CPT | Performed by: FAMILY MEDICINE

## 2023-10-04 PROCEDURE — 84443 ASSAY THYROID STIM HORMONE: CPT | Performed by: FAMILY MEDICINE

## 2023-10-04 PROCEDURE — 99214 OFFICE O/P EST MOD 30 MIN: CPT | Mod: 25 | Performed by: FAMILY MEDICINE

## 2023-10-04 RX ORDER — ATENOLOL 25 MG/1
25 TABLET ORAL DAILY
Qty: 90 TABLET | Refills: 3 | Status: SHIPPED | OUTPATIENT
Start: 2023-10-04

## 2023-10-04 SDOH — HEALTH STABILITY: PHYSICAL HEALTH: ON AVERAGE, HOW MANY DAYS PER WEEK DO YOU ENGAGE IN MODERATE TO STRENUOUS EXERCISE (LIKE A BRISK WALK)?: 1 DAY

## 2023-10-04 ASSESSMENT — ENCOUNTER SYMPTOMS
NAUSEA: 0
FREQUENCY: 0
PARESTHESIAS: 0
DIARRHEA: 0
ABDOMINAL PAIN: 0
DYSURIA: 0
CONSTIPATION: 0
DIZZINESS: 0
PALPITATIONS: 0
SORE THROAT: 0
CHILLS: 0
HEMATOCHEZIA: 0
HEMATURIA: 0
COUGH: 0
ARTHRALGIAS: 1
NERVOUS/ANXIOUS: 0
JOINT SWELLING: 0
HEADACHES: 0
SHORTNESS OF BREATH: 0
FEVER: 0
BREAST MASS: 0
MYALGIAS: 0
WEAKNESS: 0
EYE PAIN: 0
HEARTBURN: 0

## 2023-10-04 ASSESSMENT — SOCIAL DETERMINANTS OF HEALTH (SDOH)
HOW OFTEN DO YOU ATTENT MEETINGS OF THE CLUB OR ORGANIZATION YOU BELONG TO?: NEVER
DO YOU BELONG TO ANY CLUBS OR ORGANIZATIONS SUCH AS CHURCH GROUPS UNIONS, FRATERNAL OR ATHLETIC GROUPS, OR SCHOOL GROUPS?: NO
IN A TYPICAL WEEK, HOW MANY TIMES DO YOU TALK ON THE PHONE WITH FAMILY, FRIENDS, OR NEIGHBORS?: MORE THAN THREE TIMES A WEEK

## 2023-10-04 ASSESSMENT — PAIN SCALES - GENERAL: PAINLEVEL: NO PAIN (0)

## 2023-10-04 ASSESSMENT — LIFESTYLE VARIABLES: HOW OFTEN DO YOU HAVE A DRINK CONTAINING ALCOHOL: NEVER

## 2023-10-04 ASSESSMENT — ACTIVITIES OF DAILY LIVING (ADL): CURRENT_FUNCTION: NO ASSISTANCE NEEDED

## 2023-10-04 NOTE — PROGRESS NOTES
"SUBJECTIVE:   Ailyn is a 73 year old who presents for Preventive Visit.      10/4/2023    10:18 AM   Additional Questions   Roomed by Kylah Aranda     Here for wellness visit.    Stopped taking her statin, working on dietary changes, little butter or bread.      Are you in the first 12 months of your Medicare coverage?  No    Healthy Habits:     In general, how would you rate your overall health?  Good    Frequency of exercise:  1 day/week    Duration of exercise:  Less than 15 minutes    Do you usually eat at least 4 servings of fruit and vegetables a day, include whole grains    & fiber and avoid regularly eating high fat or \"junk\" foods?  No    Taking medications regularly:  Yes    Medication side effects:  None    Ability to successfully perform activities of daily living:  No assistance needed    Home Safety:  No safety concerns identified    Hearing Impairment:  No hearing concerns    In the past 6 months, have you been bothered by leaking of urine? Yes    In general, how would you rate your overall mental or emotional health?  Good    Additional concerns today:  No      Today's PHQ-2 Score:       10/4/2023    10:18 AM   PHQ-2 ( 1999 [employer])   Q1: Little interest or pleasure in doing things 0   Q2: Feeling down, depressed or hopeless 0   PHQ-2 Score 0   Q1: Little interest or pleasure in doing things Not at all   Q2: Feeling down, depressed or hopeless Not at all   PHQ-2 Score 0           Have you ever done Advance Care Planning? (For example, a Health Directive, POLST, or a discussion with a medical provider or your loved ones about your wishes): No, advance care planning information given to patient to review.  Patient declined advance care planning discussion at this time.     Fall risk  Fallen 2 or more times in the past year?: No  Any fall with injury in the past year?: No      Cognitive Screening   1) Repeat 3 items (Leader, Season, Table)    2) Clock draw: NORMAL  3) 3 item recall: Recalls 3 " objects  Results: 3 items recalled: COGNITIVE IMPAIRMENT LESS LIKELY    Mini-CogTM Copyright ANA LUISA Kennedy. Licensed by the author for use in [facility]; reprinted with permission (colt@.[email or web address]). All rights reserved.        Reviewed and updated as needed this visit by clinical staff   Tobacco  Allergies  Meds  Problems  Med Hx  Surg Hx  Fam Hx          Reviewed and updated as needed this visit by Provider   Tobacco  Allergies  Meds  Problems  Med Hx  Surg Hx  Fam Hx         Social History     Tobacco Use    Smoking status: Never    Smokeless tobacco: Never   Substance Use Topics    Alcohol use: Not Currently             10/4/2023    10:18 AM   Alcohol Use   Prescreen: >3 drinks/day or >7 drinks/week? Not Applicable     Do you have a current opioid prescription? No  Do you use any other controlled substances or medications that are not prescribed by a provider? None    Current providers sharing in care for this patient include:   Patient Care Team:  Salud Etienne MD as PCP - General (Family Practice)  Salud Etienne MD as Assigned PCP    The following health maintenance items are reviewed in Epic and correct as of today:  Health Maintenance   Topic Date Due    DEXA  Never done    COLORECTAL CANCER SCREENING  Never done    HEMOGLOBIN  03/14/2023    MICROALBUMIN  03/29/2023    INFLUENZA VACCINE (1) 09/01/2023    COVID-19 Vaccine (5 - 2023-24 season) 09/01/2023    BMP  09/15/2023    LIPID  09/15/2023    MAMMO SCREENING  07/29/2024    MEDICARE ANNUAL WELLNESS VISIT  10/04/2024    ANNUAL REVIEW OF HM ORDERS  10/04/2024    FALL RISK ASSESSMENT  10/04/2024    DTAP/TDAP/TD IMMUNIZATION (2 - Td or Tdap) 11/22/2027    ADVANCE CARE PLANNING  10/04/2028    PHQ-2 (once per calendar year)  Completed    Pneumococcal Vaccine: 65+ Years  Completed    URINALYSIS  Completed    ZOSTER IMMUNIZATION  Completed    HEPATITIS C SCREENING  Addressed    IPV IMMUNIZATION  Aged Out    HPV  IMMUNIZATION  Aged Out    MENINGITIS IMMUNIZATION  Aged Out     Lab work is in process  Labs reviewed in EPIC  BP Readings from Last 3 Encounters:   10/04/23 132/80   09/15/22 130/80   08/15/22 137/81    Wt Readings from Last 3 Encounters:   10/04/23 89.4 kg (197 lb 3.2 oz)   09/15/22 86 kg (189 lb 11.2 oz)   08/15/22 88 kg (194 lb)                  Patient Active Problem List   Diagnosis    Elevated fasting glucose    Essential hypertension    Gout    HLD (hyperlipidemia)    Vitiligo    Stage 3a chronic kidney disease (H)     Past Surgical History:   Procedure Laterality Date    CT TOTAL HIP ARTHROPLASTY      right    XR ELBOW SURGERY VITALIY RIGHT         Social History     Tobacco Use    Smoking status: Never    Smokeless tobacco: Never   Substance Use Topics    Alcohol use: Not Currently     Family History   Problem Relation Age of Onset    Hypertension Mother     Emphysema Father     Breast Cancer Sister     Prostate Cancer Brother          Current Outpatient Medications   Medication Sig Dispense Refill    atenolol (TENORMIN) 25 MG tablet Take 1 tablet (25 mg) by mouth daily 90 tablet 3    ibuprofen (ADVIL/MOTRIN) 800 MG tablet Take 1 tablet (800 mg) by mouth every 8 hours as needed for moderate pain 90 tablet 0    triamterene-HCTZ (DYAZIDE) 37.5-25 MG capsule Take 1 capsule by mouth every evening 90 capsule 0    allopurinol (ZYLOPRIM) 100 MG tablet Take 1 tablet (100 mg) by mouth daily (Patient not taking: Reported on 10/4/2023) 90 tablet 0    atorvastatin (LIPITOR) 20 MG tablet Take 1 tablet (20 mg) by mouth daily (Patient not taking: Reported on 10/4/2023) 90 tablet 1    indomethacin (INDOCIN) 50 MG capsule Take 1 capsule (50 mg) by mouth 3 times daily as needed for moderate pain . Take with food. (Patient not taking: Reported on 10/4/2023) 30 capsule 0     No Known Allergies  Recent Labs   Lab Test 09/15/22  1638 03/29/22  0900 03/14/22  2309 03/04/22  1048 03/04/22  1048 05/19/21  1630 03/08/21  1030  "02/07/20  0929   A1C  --  5.3  --   --   --   --  5.4  --    * 141*  --   --   --   --  109* 128*   HDL 66 51  --   --   --   --  41* 44*   TRIG 78 131  --   --   --   --  156* 140   ALT 23  --   --   --  27 28 24 29   CR 0.89  --  1.09*   < > 0.81 0.92 1.09* 0.90   GFRESTIMATED 69  --  54*   < > 77 62 51* 65   GFRESTBLACK  --   --   --   --   --  72 59* 75   POTASSIUM 3.7  --  4.2   < > 3.7 4.1 3.2* 3.9   TSH  --   --   --   --   --   --  1.67 2.21    < > = values in this interval not displayed.      Mammogram Screening: Mammogram Screening: Recommended mammography every 1-2 years with patient discussion and risk factor consideration    Review of Systems   Constitutional:  Negative for chills and fever.   HENT:  Negative for congestion, ear pain, hearing loss and sore throat.    Eyes:  Negative for pain and visual disturbance.   Respiratory:  Negative for cough and shortness of breath.    Cardiovascular:  Negative for chest pain, palpitations and peripheral edema.   Gastrointestinal:  Negative for abdominal pain, constipation, diarrhea, heartburn, hematochezia and nausea.   Breasts:  Negative for tenderness, breast mass and discharge.   Genitourinary:  Positive for urgency. Negative for dysuria, frequency, genital sores, hematuria, pelvic pain, vaginal bleeding and vaginal discharge.   Musculoskeletal:  Positive for arthralgias. Negative for joint swelling and myalgias.   Skin:  Negative for rash.   Neurological:  Negative for dizziness, weakness, headaches and paresthesias.   Psychiatric/Behavioral:  Negative for mood changes. The patient is not nervous/anxious.          OBJECTIVE:   /80 (BP Location: Right arm, Patient Position: Sitting, Cuff Size: Adult Large)   Pulse 70   Temp 98.1  F (36.7  C) (Oral)   Resp 16   Ht 1.651 m (5' 5\")   Wt 89.4 kg (197 lb 3.2 oz)   LMP  (LMP Unknown)   SpO2 98%   Breastfeeding No   BMI 32.82 kg/m   Estimated body mass index is 32.82 kg/m  as calculated from " "the following:    Height as of this encounter: 1.651 m (5' 5\").    Weight as of this encounter: 89.4 kg (197 lb 3.2 oz).  Physical Exam  GENERAL: healthy, alert and no distress  RESP: lungs clear to auscultation - no rales, rhonchi or wheezes  CV: regular rates and rhythm  PSYCH: mentation appears normal, affect normal/bright    Diagnostic Test Results:  Labs reviewed in Epic    ASSESSMENT / PLAN:       ICD-10-CM    1. Routine general medical examination at a health care facility  Z00.00 TSH with free T4 reflex     CBC with platelets     Vitamin D Deficiency     TSH with free T4 reflex     CBC with platelets     Vitamin D Deficiency      2. Hyperlipidemia, unspecified hyperlipidemia type  E78.5 Lipid panel reflex to direct LDL Fasting     Comprehensive metabolic panel (BMP + Alb, Alk Phos, ALT, AST, Total. Bili, TP)     Lipid panel reflex to direct LDL Fasting     Comprehensive metabolic panel (BMP + Alb, Alk Phos, ALT, AST, Total. Bili, TP)      3. Elevated fasting glucose  R73.01 Hemoglobin A1c     Hemoglobin A1c      4. Chronic gout of multiple sites, unspecified cause  M1A.09X0 Uric acid     Uric acid      5. Stage 3a chronic kidney disease (H)  N18.31 Comprehensive metabolic panel (BMP + Alb, Alk Phos, ALT, AST, Total. Bili, TP)     Albumin Random Urine Quantitative with Creat Ratio     Comprehensive metabolic panel (BMP + Alb, Alk Phos, ALT, AST, Total. Bili, TP)     Albumin Random Urine Quantitative with Creat Ratio      6. Benign essential hypertension  I10 Comprehensive metabolic panel (BMP + Alb, Alk Phos, ALT, AST, Total. Bili, TP)     atenolol (TENORMIN) 25 MG tablet     Comprehensive metabolic panel (BMP + Alb, Alk Phos, ALT, AST, Total. Bili, TP)      7. Post-menopausal  Z78.0 DX Hip/Pelvis/Spine      8. Colon cancer screening  Z12.11 Colonoscopy Screening  Referral          Patient has been advised of split billing requirements and indicates understanding: Yes      COUNSELING:  Reviewed " preventive health counseling, as reflected in patient instructions        She reports that she has never smoked. She has never used smokeless tobacco.      Appropriate preventive services were discussed with this patient, including applicable screening as appropriate for fall prevention, nutrition, physical activity, Tobacco-use cessation, weight loss and cognition.  Checklist reviewing preventive services available has been given to the patient.    Reviewed patients plan of care and provided an AVS. The Basic Care Plan (routine screening as documented in Health Maintenance) for Ailyn meets the Care Plan requirement. This Care Plan has been established and reviewed with the Patient.          Salud Donovan MD  [facility]    Identified Health Risks:  I have reviewed Opioid Use Disorder and Substance Use Disorder risk factors and made any needed referrals.

## 2023-10-05 LAB
ALBUMIN SERPL BCG-MCNC: 4.1 G/DL (ref 3.5–5.2)
ALP SERPL-CCNC: 53 U/L (ref 35–104)
ALT SERPL W P-5'-P-CCNC: 17 U/L (ref 0–50)
ANION GAP SERPL CALCULATED.3IONS-SCNC: 12 MMOL/L (ref 7–15)
AST SERPL W P-5'-P-CCNC: 22 U/L (ref 0–45)
BILIRUB SERPL-MCNC: 0.5 MG/DL
BUN SERPL-MCNC: 23.6 MG/DL (ref 8–23)
CALCIUM SERPL-MCNC: 9.8 MG/DL (ref 8.8–10.2)
CHLORIDE SERPL-SCNC: 100 MMOL/L (ref 98–107)
CHOLEST SERPL-MCNC: 199 MG/DL
CREAT SERPL-MCNC: 0.89 MG/DL (ref 0.51–0.95)
DEPRECATED HCO3 PLAS-SCNC: 26 MMOL/L (ref 22–29)
EGFRCR SERPLBLD CKD-EPI 2021: 68 ML/MIN/1.73M2
GLUCOSE SERPL-MCNC: 105 MG/DL (ref 70–99)
HDLC SERPL-MCNC: 43 MG/DL
LDLC SERPL CALC-MCNC: 126 MG/DL
NONHDLC SERPL-MCNC: 156 MG/DL
POTASSIUM SERPL-SCNC: 4.2 MMOL/L (ref 3.4–5.3)
PROT SERPL-MCNC: 8 G/DL (ref 6.4–8.3)
SODIUM SERPL-SCNC: 138 MMOL/L (ref 135–145)
TRIGL SERPL-MCNC: 151 MG/DL
TSH SERPL DL<=0.005 MIU/L-ACNC: 1.71 UIU/ML (ref 0.3–4.2)
URATE SERPL-MCNC: 9.1 MG/DL (ref 2.4–5.7)
VIT D+METAB SERPL-MCNC: 10 NG/ML (ref 20–50)

## 2023-10-07 DIAGNOSIS — E55.9 VITAMIN D DEFICIENCY: Primary | ICD-10-CM

## 2023-10-19 DIAGNOSIS — I10 BENIGN ESSENTIAL HYPERTENSION: ICD-10-CM

## 2023-10-20 RX ORDER — TRIAMTERENE AND HYDROCHLOROTHIAZIDE 37.5; 25 MG/1; MG/1
1 CAPSULE ORAL EVERY EVENING
Qty: 90 CAPSULE | Refills: 1 | Status: SHIPPED | OUTPATIENT
Start: 2023-10-20 | End: 2024-04-09

## 2023-10-20 RX ORDER — TRIAMTERENE AND HYDROCHLOROTHIAZIDE 37.5; 25 MG/1; MG/1
1 CAPSULE ORAL EVERY EVENING
Qty: 90 CAPSULE | Refills: 1 | Status: SHIPPED | OUTPATIENT
Start: 2023-10-20 | End: 2023-10-20

## 2023-10-20 NOTE — TELEPHONE ENCOUNTER
1st attempt to refill triamterene/hydrochlorothiazide failed.  2nd attempt made.  Daysi Campoverde RN

## 2023-11-21 ENCOUNTER — MYC REFILL (OUTPATIENT)
Dept: FAMILY MEDICINE | Facility: CLINIC | Age: 73
End: 2023-11-21
Payer: COMMERCIAL

## 2023-11-21 DIAGNOSIS — E78.5 HYPERLIPIDEMIA, UNSPECIFIED HYPERLIPIDEMIA TYPE: ICD-10-CM

## 2023-11-21 RX ORDER — ATORVASTATIN CALCIUM 20 MG/1
20 TABLET, FILM COATED ORAL DAILY
Qty: 90 TABLET | Refills: 3 | Status: SHIPPED | OUTPATIENT
Start: 2023-11-21

## 2024-04-08 ENCOUNTER — NURSE TRIAGE (OUTPATIENT)
Dept: FAMILY MEDICINE | Facility: CLINIC | Age: 74
End: 2024-04-08
Payer: COMMERCIAL

## 2024-04-08 NOTE — TELEPHONE ENCOUNTER
Situation:  Pain just above the waistline started 3/28, then 3/30 had knife stabbing pain (10/10, had debated going to ED) and then went away. It started again last night and gradually increased since then.     Background:  Sedentary job, does a lot of sitting. Hx gout, kidney disease, hypertension.    Assessment:  -Pain located above waist line on back, starts on the R side and radiates further over on the R side. Feels like pressure. It comes and goes.   -Moderate pain (Interferes with normal activities or awakens from sleep), rates 1/10 at rest but increases up to a 9/10 with standing up, leaning one direction, rolling over in bed, walking. Better when not moving.  -Last weekend took ibuprofen/tylenol and tried heat. Ibuprofen/tylenol helped some at that time. Hasn't tried anything yet today.  -Denies back overuse/injury, hx or known kidney stones (though she suspected that could be what was going on; daughter has had those), neurologic sx (weakness, numbness, changes with bowel/bladder control), fever, abdomen pain, burning with urination, blood in urine, chest pain, shortness of breath    Recommendation:  See in Office Today or Tomorrow. Scheduled for 4/9 office visit. Encouraged tylenol/ibuprofen and heat until appointment.    Reason for Disposition   Age > 50 and no history of prior similar back pain    Additional Information   Negative: Passed out (i.e., fainted, collapsed and was not responding)   Negative: Shock suspected (e.g., cold/pale/clammy skin, too weak to stand, low BP, rapid pulse)   Negative: Sounds like a life-threatening emergency to the triager   Negative: Major injury to the back (e.g., MVA, fall > 10 feet or 3 meters, penetrating injury, etc.)   Negative: Pain in the upper back over the ribs (rib cage) that radiates (travels) into the chest   Negative: Pain in the upper back over the ribs (rib cage) and worsened by coughing (or clearly increases with breathing)   Negative: Back pain during  pregnancy   Negative: SEVERE back pain of sudden onset and age > 60 years   Negative: SEVERE abdominal pain (e.g., excruciating)   Negative: Abdominal pain and age > 60 years   Negative: Unable to urinate (or only a few drops) and bladder feels very full   Negative: Loss of bladder or bowel control (urine or bowel incontinence; wetting self, leaking stool) of new-onset   Negative: Numbness (loss of sensation) in groin or rectal area   Negative: Pain radiates into groin, scrotum   Negative: Blood in urine (red, pink, or tea-colored)   Negative: Vomiting and pain over lower ribs of back (i.e., flank - kidney area)   Negative: Weakness of a leg or foot (e.g., unable to bear weight, dragging foot)   Negative: Patient sounds very sick or weak to the triager   Negative: Fever > 100.4 F (38.0 C) and flank pain   Negative: Pain or burning with passing urine (urination)   Negative: SEVERE back pain (e.g., excruciating, unable to do any normal activities) and not improved after pain medicine and CARE ADVICE   Negative: Numbness in an arm or hand (i.e., loss of sensation) and upper back pain   Negative: Numbness in a leg or foot (i.e., loss of sensation)   Negative: High-risk adult (e.g., history of cancer, history of HIV, or history of IV Drug Use)   Negative: Soft tissue infection (e.g., abscess, cellulitis) or other serious infection (e.g., bacteremia) in last 2 weeks   Negative: Painful rash with multiple small blisters grouped together (i.e., dermatomal distribution or 'band' or 'stripe')   Negative: Pain radiates into the thigh or further down the leg, and in both legs    Protocols used: Back Pain-A-[place]

## 2024-04-09 ENCOUNTER — HOSPITAL ENCOUNTER (OUTPATIENT)
Dept: CT IMAGING | Facility: CLINIC | Age: 74
Discharge: HOME | End: 2024-04-09
Attending: PHYSICIAN ASSISTANT | Admitting: PHYSICIAN ASSISTANT
Payer: COMMERCIAL

## 2024-04-09 ENCOUNTER — OFFICE VISIT (OUTPATIENT)
Dept: FAMILY MEDICINE | Facility: CLINIC | Age: 74
End: 2024-04-09
Payer: COMMERCIAL

## 2024-04-09 VITALS
DIASTOLIC BLOOD PRESSURE: 70 MMHG | OXYGEN SATURATION: 98 % | HEART RATE: 68 BPM | WEIGHT: 205.2 LBS | HEIGHT: 65 IN | SYSTOLIC BLOOD PRESSURE: 126 MMHG | RESPIRATION RATE: 12 BRPM | TEMPERATURE: 97.8 F | BODY MASS INDEX: 34.19 KG/M2

## 2024-04-09 DIAGNOSIS — R10.9 FLANK PAIN: Primary | ICD-10-CM

## 2024-04-09 DIAGNOSIS — R10.9 FLANK PAIN: ICD-10-CM

## 2024-04-09 DIAGNOSIS — M1A.09X0 CHRONIC GOUT OF MULTIPLE SITES, UNSPECIFIED CAUSE: ICD-10-CM

## 2024-04-09 DIAGNOSIS — Z12.11 SCREEN FOR COLON CANCER: ICD-10-CM

## 2024-04-09 DIAGNOSIS — N18.31 STAGE 3A CHRONIC KIDNEY DISEASE (H): ICD-10-CM

## 2024-04-09 DIAGNOSIS — N28.89 LEFT RENAL MASS: ICD-10-CM

## 2024-04-09 DIAGNOSIS — I10 BENIGN ESSENTIAL HYPERTENSION: ICD-10-CM

## 2024-04-09 LAB
ALBUMIN UR-MCNC: NEGATIVE MG/DL
ANION GAP SERPL CALCULATED.3IONS-SCNC: 13 MMOL/L (ref 7–15)
APPEARANCE UR: CLEAR
BACTERIA #/AREA URNS HPF: ABNORMAL /HPF
BILIRUB UR QL STRIP: NEGATIVE
BUN SERPL-MCNC: 22.7 MG/DL (ref 8–23)
CALCIUM SERPL-MCNC: 9.7 MG/DL (ref 8.8–10.2)
CHLORIDE SERPL-SCNC: 102 MMOL/L (ref 98–107)
COLOR UR AUTO: YELLOW
CREAT SERPL-MCNC: 0.93 MG/DL (ref 0.51–0.95)
DEPRECATED HCO3 PLAS-SCNC: 23 MMOL/L (ref 22–29)
EGFRCR SERPLBLD CKD-EPI 2021: 65 ML/MIN/1.73M2
GLUCOSE SERPL-MCNC: 99 MG/DL (ref 70–99)
GLUCOSE UR STRIP-MCNC: NEGATIVE MG/DL
HGB UR QL STRIP: ABNORMAL
KETONES UR STRIP-MCNC: NEGATIVE MG/DL
LEUKOCYTE ESTERASE UR QL STRIP: NEGATIVE
NITRATE UR QL: NEGATIVE
PH UR STRIP: 5.5 [PH] (ref 5–7)
POTASSIUM SERPL-SCNC: 4.2 MMOL/L (ref 3.4–5.3)
RBC #/AREA URNS AUTO: ABNORMAL /HPF
SODIUM SERPL-SCNC: 138 MMOL/L (ref 135–145)
SP GR UR STRIP: 1.02 (ref 1–1.03)
SQUAMOUS #/AREA URNS AUTO: ABNORMAL /LPF
UROBILINOGEN UR STRIP-ACNC: 0.2 E.U./DL
WBC #/AREA URNS AUTO: ABNORMAL /HPF

## 2024-04-09 PROCEDURE — 74176 CT ABD & PELVIS W/O CONTRAST: CPT

## 2024-04-09 PROCEDURE — 80048 BASIC METABOLIC PNL TOTAL CA: CPT | Performed by: PHYSICIAN ASSISTANT

## 2024-04-09 PROCEDURE — 99214 OFFICE O/P EST MOD 30 MIN: CPT | Performed by: PHYSICIAN ASSISTANT

## 2024-04-09 PROCEDURE — 36415 COLL VENOUS BLD VENIPUNCTURE: CPT | Performed by: PHYSICIAN ASSISTANT

## 2024-04-09 PROCEDURE — 81001 URINALYSIS AUTO W/SCOPE: CPT | Performed by: PHYSICIAN ASSISTANT

## 2024-04-09 RX ORDER — TRIAMTERENE AND HYDROCHLOROTHIAZIDE 37.5; 25 MG/1; MG/1
1 CAPSULE ORAL EVERY EVENING
Qty: 90 CAPSULE | Refills: 1 | Status: SHIPPED | OUTPATIENT
Start: 2024-04-09

## 2024-04-09 ASSESSMENT — ENCOUNTER SYMPTOMS: BACK PAIN: 1

## 2024-04-09 NOTE — PROGRESS NOTES
Assessment & Plan     Flank pain  Tenderness to palpation on right flank. No CVA tenderness. UA without blood. Further evaluation with CT abdomen/pelvis due to severity of pain and on going nature of symptoms.Kidney stone rule out needed.  Checking labs as noted below.  - UA Macroscopic with reflex to Microscopic and Culture - Lab Collect; Future  - Basic metabolic panel  (Ca, Cl, CO2, Creat, Gluc, K, Na, BUN); Future  - CT Abdomen Pelvis w/o Contrast; Future  - UA Macroscopic with reflex to Microscopic and Culture - Lab Collect  - Basic metabolic panel  (Ca, Cl, CO2, Creat, Gluc, K, Na, BUN)  - UA Microscopic with Reflex to Culture    Benign essential hypertension  Upon recheck normal. Continue use of current treatment.  - triamterene-HCTZ (DYAZIDE) 37.5-25 MG capsule; Take 1 capsule by mouth every evening    Screen for colon cancer  Due for screening colonoscopy. Last had in 2011 and was normal.  - Colonoscopy Screening  Referral; Future    Stage 2-3a chronic kidney disease (H)  Checking labs today.    Chronic gout of multiple sites, unspecified cause  Last uric acid was quite high but she is not currently taking the allopurinol. Discussed that I would recommend taking as she could have a gout flare that is more severe. At this time she is worried about her kidney function and does not wish to be on the allopurinol.    Left renal mass   Further evaluation with CT renal ordered.     Review of external notes as documented elsewhere in note  Review of the result(s) of each unique test - uric acid from October and UA from today  Ordering of each unique test    Subjective   Ailyn is a 73 year old, presenting for the following health issues:  Back Pain and Medication Request (Hydrochlorothiazide - switched to 90 days, send to [facility] in [place] )        4/9/2024     1:18 PM   Additional Questions   Roomed by AT     History of Present Illness       Back Pain:  She presents for follow up of back pain.  Patient's back pain is a recurring problem.  Location of back pain:  Right lower back  Description of back pain: stabbing  Back pain spreads: nowhere    Since last visit, how has back pain changed: unchanged  Since patient first noticed back pain, pain is: gradually worsening  Does back pain interfere with her job:  No  Has the patient tried any new treatments:  No       Reason for visit:  Back pain  Symptom onset:  3-4 weeks ago  Symptoms include:  Lower back pain  Symptom intensity:  Moderate  Symptom progression:  Staying the same  Had these symptoms before:  Yes    She eats 2-3 servings of fruits and vegetables daily.She consumes 0 sweetened beverage(s) daily.She exercises with enough effort to increase her heart rate 9 or less minutes per day.  She exercises with enough effort to increase her heart rate 3 or less days per week.   She is taking medications regularly.       Pain History:  When did you first notice your pain? 1 week (1 month ago had similar pain but improved)    Have you seen anyone else for your pain? Yes -   How has your pain affected your ability to work? 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 Back Pain  Onset/Duration: 1 week   Description:   Location of pain: low back right  Character of pain: stabbing and intermittent  Pain radiation: none, if anything radiates to the R flank   New numbness or weakness in legs, not attributed to pain: no   Intensity: Currently 1/10, At its worst 9-10/10, moderate  Progression of Symptoms: same and intermittent  History:   Specific cause: none  Pain interferes with job: No  History of back problems: Yes -   Any previous MRI or X-rays: None  Sees a specialist for back pain: No  Alleviating factors:   Improved by: acetaminophen (Tylenol) and NSAIDs    Precipitating factors:  Worsened by: Lifting, Bending, Standing, Sitting, and basic movements of turning/twisting, rolling  "over in bed  Therapies tried and outcome: acetaminophen (Tylenol) and NSAIDs - mild effect, gentle stretching helps        Objective    /70   Pulse 68   Temp 97.8  F (36.6  C) (Oral)   Resp 12   Ht 1.651 m (5' 5\")   Wt 93.1 kg (205 lb 3.2 oz)   LMP  (LMP Unknown)   SpO2 98%   BMI 34.15 kg/m    Body mass index is 34.15 kg/m .  Physical Exam   GENERAL: No acute distress  HEENT: Normocephalic,  : No CVA tenderness bilaterally.   EXTREMITIES: No midline tenderness over the thoracic or lumbar spine. Tenderness over the right lumbar paraspinous muscles. No tenderness over the left lumbar paraspinous muscles.  Right lower extremity: 5/5 strength with flexion of the knee, 5/5 strength with extension of the knee, 5/5 strength with flexion of the hip, 5/5 strength with dorsiflexion, 5/5 strength with plantar flexion. Straight leg raise negative  Left lower extremity: 5/5 strength with flexion of the knee, 5/5 strength with extension of the knee, 5/5 strength with flexion of the hip, 5/5 strength with dorsiflexion, 5/5 strength with plantar flexion. Straight leg raise negative.  NEURO: Alert and non-focal    Results for orders placed or performed in visit on 04/09/24 (from the past 24 hour(s))   UA Macroscopic with reflex to Microscopic and Culture - Lab Collect    Specimen: Urine, Midstream   Result Value Ref Range    Color Urine Yellow Colorless, Straw, Light Yellow, Yellow    Appearance Urine Clear Clear    Glucose Urine Negative Negative mg/dL    Bilirubin Urine Negative Negative    Ketones Urine Negative Negative mg/dL    Specific Gravity Urine 1.020 1.003 - 1.035    Blood Urine Trace (A) Negative    pH Urine 5.5 5.0 - 7.0    Protein Albumin Urine Negative Negative mg/dL    Urobilinogen Urine 0.2 0.2, 1.0 E.U./dL    Nitrite Urine Negative Negative    Leukocyte Esterase Urine Negative Negative   UA Microscopic with Reflex to Culture   Result Value Ref Range    Bacteria Urine Few (A) None Seen /HPF    RBC " Urine 0-2 0-2 /HPF /HPF    WBC Urine 0-5 0-5 /HPF /HPF    Squamous Epithelials Urine Few (A) None Seen /LPF    Narrative    Urine Culture not indicated           Signed Electronically by: Key Gaines PA-C

## 2024-04-10 ENCOUNTER — TELEPHONE (OUTPATIENT)
Dept: FAMILY MEDICINE | Facility: CLINIC | Age: 74
End: 2024-04-10
Payer: COMMERCIAL

## 2024-04-10 NOTE — TELEPHONE ENCOUNTER
Called patient and advised of below lab note and below.  Gave CT scheduling and PT scheduling #.  Patient agrees with plan.  Sophia Doe, RN    PT- please call [phone].     Jamarcus Solisra,     CT abdomen/pelvis shows no stones.  There is a new cyst on the left kidney. I will order testing as recommended to further assess.  There are stones in the gallbladder without signs of infection (chololithiasis).  There are diverticula (small out pouches from the colon) without signs of infection (diverticulitis).  There are some enlarged lymph nodes on the right side perhaps reactive (secondary to the pain you had a month ago or more recently).  There is arthritis in the low low back as well as the left hip.     I would recommend physical therapy for your symptoms and I will order the imaging needed to assess the left kidney more.  Please let me know if you have any questions.     Thank you,  Key

## 2024-04-10 NOTE — TELEPHONE ENCOUNTER
Please call patient to review CT results from yesterday (message sent in [employer] but has not been reviewed).  Also let her know electrolytes and kidney function look good.

## 2024-05-08 ENCOUNTER — HOSPITAL ENCOUNTER (OUTPATIENT)
Dept: CT IMAGING | Facility: CLINIC | Age: 74
Discharge: HOME | End: 2024-05-08
Attending: PHYSICIAN ASSISTANT | Admitting: PHYSICIAN ASSISTANT
Payer: COMMERCIAL

## 2024-05-08 DIAGNOSIS — N28.89 LEFT RENAL MASS: ICD-10-CM

## 2024-05-08 LAB
CREAT BLD-MCNC: 1 MG/DL (ref 0.5–1)
EGFRCR SERPLBLD CKD-EPI 2021: 59 ML/MIN/1.73M2

## 2024-05-08 PROCEDURE — 82565 ASSAY OF CREATININE: CPT

## 2024-05-08 PROCEDURE — 74178 CT ABD&PLV WO CNTR FLWD CNTR: CPT

## 2024-05-08 PROCEDURE — 250N000011 HC RX IP 250 OP 636: Performed by: RADIOLOGY

## 2024-05-08 PROCEDURE — 250N000009 HC RX 250: Performed by: RADIOLOGY

## 2024-05-08 RX ORDER — IOPAMIDOL 755 MG/ML
500 INJECTION, SOLUTION INTRAVASCULAR ONCE
Status: COMPLETED | OUTPATIENT
Start: 2024-05-08 | End: 2024-05-08

## 2024-05-08 RX ADMIN — IOPAMIDOL 100 ML: 755 INJECTION, SOLUTION INTRAVENOUS at 15:11

## 2024-05-08 RX ADMIN — SODIUM CHLORIDE 63 ML: 9 INJECTION, SOLUTION INTRAVENOUS at 15:11

## 2024-05-09 ENCOUNTER — TELEPHONE (OUTPATIENT)
Dept: FAMILY MEDICINE | Facility: CLINIC | Age: 74
End: 2024-05-09
Payer: COMMERCIAL

## 2024-05-09 DIAGNOSIS — N28.89 LEFT RENAL MASS: Primary | ICD-10-CM

## 2024-05-09 NOTE — TELEPHONE ENCOUNTER
I called patient and left message to call back clinic regarding CT results.    CT shows indeterminate mass of the kidney but recommending referral to urology for next steps. At this time cannot say if benign (non-cancerous) or cancerous but enough concern to warrant referral.

## 2024-05-09 NOTE — TELEPHONE ENCOUNTER
"Notified Patient of Key Gaines PA-C's message: \"CT shows indeterminate mass of the kidney but recommending referral to urology for next steps. At this time cannot say if benign (non-cancerous) or cancerous but enough concern to warrant referral.\"    I also gave her the number for the urology referral .    Person was given an opportunity to ask questions, verbalized understanding of plan, and is agreeable.    Lulu Velázquez RN on 5/9/2024 at 2:45 PM    "

## 2024-05-14 ENCOUNTER — VIRTUAL VISIT (OUTPATIENT)
Dept: UROLOGY | Facility: CLINIC | Age: 74
End: 2024-05-14
Payer: COMMERCIAL

## 2024-05-14 DIAGNOSIS — N28.1 ACQUIRED CYST OF KIDNEY: Primary | ICD-10-CM

## 2024-05-14 PROCEDURE — 99203 OFFICE O/P NEW LOW 30 MIN: CPT | Mod: 95 | Performed by: UROLOGY

## 2024-05-14 ASSESSMENT — PAIN SCALES - GENERAL: PAINLEVEL: NO PAIN (0)

## 2024-05-14 NOTE — LETTER
5/14/2024       RE: Ailyn Beck  [address] [facility] [address]     Dear Colleague,    Thank you for referring your patient, Ailyn Beck, to the [facility] [place] at [facility]. Please see a copy of my visit note below.    [facility]  Main Office: [address]  [address]  [place] [address]       CHIEF COMPLAINT:  Left renal cyst    HISTORY:   This is a 74-year-old woman who has had right-sided abdominal pain and had a noncontrast CT scan performed for evaluation.  There were no abnormalities found in the right kidney around the right side and it was recommended that she see physical therapy for her pain.  However, she did have 2 renal cysts discovered incidentally on the left kidney, the contralateral side.  She had a follow-up CT scan with contrast and it again showed 2 complex renal cysts.  She is asymptomatic on the left side.  She has no family history of kidney cancer.      PAST MEDICAL HISTORY:   Past Medical History:   Diagnosis Date    Elevated fasting glucose     Gout     Hyperlipidemia     Hypertension     Vitiligo        PAST SURGICAL HISTORY:     Past Surgical History:   Procedure Laterality Date    [place] TOTAL HIP ARTHROPLASTY      right    XR ELBOW SURGERY VITALIY RIGHT         FAMILY HISTORY:   Family History   Problem Relation Age of Onset    Hypertension Mother     Emphysema Father     Breast Cancer Sister     Prostate Cancer Brother        SOCIAL HISTORY:   Social History     Tobacco Use    Smoking status: Never    Smokeless tobacco: Never   Substance Use Topics    Alcohol use: Not Currently        ALLERGIES:  No Known Allergies    MEDICATIONS:     Current Outpatient Medications:     atenolol (TENORMIN) 25 MG tablet, Take 1 tablet (25 mg) by mouth daily, Disp: 90 tablet, Rfl: 3    atorvastatin (LIPITOR) 20 MG tablet, Take 1 tablet (20 mg) by mouth daily, Disp: 90 tablet, Rfl: 3    ibuprofen (ADVIL/MOTRIN)  800 MG tablet, Take 1 tablet (800 mg) by mouth every 8 hours as needed for moderate pain, Disp: 90 tablet, Rfl: 0    triamterene-HCTZ (DYAZIDE) 37.5-25 MG capsule, Take 1 capsule by mouth every evening, Disp: 90 capsule, Rfl: 1    REVIEW OF SYSTEMS:  Allergic/Immunologic: negative  Constitutional Symptoms: negative   Fever: none   Weight loss: none   Other: none  Hematologic/Lymphatic: negative  Integumentary: negative   Breast: negative   Hair: negative   Skin: negative   Skin: negative  Eyes: negative  Ears/Nose/Throat: negative  Respiratory: negative  Cardiovascular: negative  Gastrointestinal: negative  Genitourinary: negative  Musculoskeletal: negative  Neurologic: negative  Psychiatric: negative  Reproductive System: negative  Endocrine: negative      PHYSICAL EXAM:    General: Alert and oriented to time, place, and self. In NAD   HEENT: Head AT/NC, EOMI, CN Grossly intact   Lungs: no respiratory distress, or pursed lip breathing   Heart: No obvious jugular venous distension present   Musculoskeltal: Normal movements. Normal appearing musculature  Skin: no suspicious lesions or rashes   Neuro: Alert, oriented, speech and mentation normal; moving all 4 extremities equally.   Psych: affect and mood normal      Laboratory Studies:     Imaging Studies: I personally reviewed her CT scan images, both noncontrast and with contrast.  She has 2 renal cyst on the left side.  The cyst in the lower pole does have calcifications present in multiple septations.      CLINICAL IMPRESSION:   Left renal cysts    PLAN:   She has 2 complex left renal cyst discovered incidentally on CT scan.  These cysts are both quite small.  They are small enough that it is difficult to determine cystic versus solid masses, in particular the lower pole cyst.  I provided her reassurance that these cysts are quite small and if they are solid masses that may not require treatment.  However, I do agree with the radiology recommendation that she  should have an MRI of her kidneys in 3 months in order to reimage the kidneys and also help determine solid versus cystic lesion.  I discussed the test and she agreed to it.  We will go over the MRI results over virtual visit when it is complete.      Michael Adams M.D.      Virtual Visit Details    Type of service:  Video Visit     Originating Location (pt. Location): Home    Distant Location (provider location):  On-site  Platform used for Video Visit: Maritza

## 2024-05-14 NOTE — PROGRESS NOTES
[facility]  Main Office: [address]  [address]  [place] [address]       CHIEF COMPLAINT:  Left renal cyst    HISTORY:   This is a 74-year-old woman who has had right-sided abdominal pain and had a noncontrast CT scan performed for evaluation.  There were no abnormalities found in the right kidney around the right side and it was recommended that she see physical therapy for her pain.  However, she did have 2 renal cysts discovered incidentally on the left kidney, the contralateral side.  She had a follow-up CT scan with contrast and it again showed 2 complex renal cysts.  She is asymptomatic on the left side.  She has no family history of kidney cancer.      PAST MEDICAL HISTORY:   Past Medical History:   Diagnosis Date    Elevated fasting glucose     Gout     Hyperlipidemia     Hypertension     Vitiligo        PAST SURGICAL HISTORY:     Past Surgical History:   Procedure Laterality Date    [place] TOTAL HIP ARTHROPLASTY      right    XR ELBOW SURGERY VITALIY RIGHT         FAMILY HISTORY:   Family History   Problem Relation Age of Onset    Hypertension Mother     Emphysema Father     Breast Cancer Sister     Prostate Cancer Brother        SOCIAL HISTORY:   Social History     Tobacco Use    Smoking status: Never    Smokeless tobacco: Never   Substance Use Topics    Alcohol use: Not Currently        ALLERGIES:  No Known Allergies    MEDICATIONS:     Current Outpatient Medications:     atenolol (TENORMIN) 25 MG tablet, Take 1 tablet (25 mg) by mouth daily, Disp: 90 tablet, Rfl: 3    atorvastatin (LIPITOR) 20 MG tablet, Take 1 tablet (20 mg) by mouth daily, Disp: 90 tablet, Rfl: 3    ibuprofen (ADVIL/MOTRIN) 800 MG tablet, Take 1 tablet (800 mg) by mouth every 8 hours as needed for moderate pain, Disp: 90 tablet, Rfl: 0    triamterene-HCTZ (DYAZIDE) 37.5-25 MG capsule, Take 1 capsule by mouth every evening, Disp: 90 capsule, Rfl: 1    REVIEW OF SYSTEMS:  Allergic/Immunologic: negative  Constitutional Symptoms:  negative   Fever: none   Weight loss: none   Other: none  Hematologic/Lymphatic: negative  Integumentary: negative   Breast: negative   Hair: negative   Skin: negative   Skin: negative  Eyes: negative  Ears/Nose/Throat: negative  Respiratory: negative  Cardiovascular: negative  Gastrointestinal: negative  Genitourinary: negative  Musculoskeletal: negative  Neurologic: negative  Psychiatric: negative  Reproductive System: negative  Endocrine: negative      PHYSICAL EXAM:    General: Alert and oriented to time, place, and self. In NAD   HEENT: Head AT/NC, EOMI, CN Grossly intact   Lungs: no respiratory distress, or pursed lip breathing   Heart: No obvious jugular venous distension present   Musculoskeltal: Normal movements. Normal appearing musculature  Skin: no suspicious lesions or rashes   Neuro: Alert, oriented, speech and mentation normal; moving all 4 extremities equally.   Psych: affect and mood normal      Laboratory Studies:     Imaging Studies: I personally reviewed her CT scan images, both noncontrast and with contrast.  She has 2 renal cyst on the left side.  The cyst in the lower pole does have calcifications present in multiple septations.      CLINICAL IMPRESSION:   Left renal cysts    PLAN:   She has 2 complex left renal cyst discovered incidentally on CT scan.  These cysts are both quite small.  They are small enough that it is difficult to determine cystic versus solid masses, in particular the lower pole cyst.  I provided her reassurance that these cysts are quite small and if they are solid masses that may not require treatment.  However, I do agree with the radiology recommendation that she should have an MRI of her kidneys in 3 months in order to reimage the kidneys and also help determine solid versus cystic lesion.  I discussed the test and she agreed to it.  We will go over the MRI results over virtual visit when it is complete.      Michael Adams M.D.      Virtual Visit Details    Type  of service:  Video Visit     Originating Location (pt. Location): Home    Distant Location (provider location):  On-site  Platform used for Video Visit: Maritza

## 2024-05-14 NOTE — NURSING NOTE
Is the patient currently in the state of [place]? YES    Visit mode:VIDEO    If the visit is dropped, the patient can be reconnected by: VIDEO VISIT: Text to cell phone:   Telephone Information:   Mobile [phone]       Will anyone else be joining the visit? NO  (If patient encounters technical issues they should call [phone]    How would you like to obtain your AVS? MyChart    Are changes needed to the allergy or medication list? No    Are refills needed on medications prescribed by this physician? NO    Reason for visit: Consult    Olimpia LOZANO

## 2024-06-11 ENCOUNTER — TELEPHONE (OUTPATIENT)
Dept: UROLOGY | Facility: CLINIC | Age: 74
End: 2024-06-11
Payer: COMMERCIAL

## 2024-06-11 NOTE — TELEPHONE ENCOUNTER
----- Message from Dayna Noel sent at 5/14/2024  2:01 PM CDT -----  Regarding: 3 month follow up  Return in about 3 months (around 8/14/2024) for MRI, video visit for results.    SDB  5/14/24

## 2024-06-12 ENCOUNTER — OFFICE VISIT (OUTPATIENT)
Dept: FAMILY MEDICINE | Facility: CLINIC | Age: 74
End: 2024-06-12
Payer: COMMERCIAL

## 2024-06-12 VITALS
HEART RATE: 86 BPM | DIASTOLIC BLOOD PRESSURE: 76 MMHG | TEMPERATURE: 98.2 F | HEIGHT: 65 IN | BODY MASS INDEX: 34.27 KG/M2 | RESPIRATION RATE: 14 BRPM | OXYGEN SATURATION: 97 % | SYSTOLIC BLOOD PRESSURE: 140 MMHG | WEIGHT: 205.7 LBS

## 2024-06-12 DIAGNOSIS — R21 RASH AND NONSPECIFIC SKIN ERUPTION: ICD-10-CM

## 2024-06-12 DIAGNOSIS — L98.9 SKIN LESION: Primary | ICD-10-CM

## 2024-06-12 PROCEDURE — 99213 OFFICE O/P EST LOW 20 MIN: CPT

## 2024-06-12 RX ORDER — KETOCONAZOLE 20 MG/G
CREAM TOPICAL DAILY
Qty: 30 G | Refills: 0 | Status: SHIPPED | OUTPATIENT
Start: 2024-06-12

## 2024-06-12 ASSESSMENT — PAIN SCALES - GENERAL: PAINLEVEL: NO PAIN (0)

## 2024-06-12 NOTE — PROGRESS NOTES
"  Assessment & Plan     (L98.9) Skin lesion  (primary encounter diagnosis)  (R21) Rash and nonspecific skin eruption  Comment: suspect seborrheic keratosis possibly. However, shared decision to have biopsied to definitively identify. Patient surrounding skin irritated, would like to treat for fungal cause given area is consistently moist. Ketoconazole ordered. Discussed having patient follow up after about a week if necessary if not improving. Patient can utilize baby powder to keep area dry as well. Continue to shower to keep area clean. Patient to be scheduled w/ Key Gaines PA-C for procedure only punch biopsy in 2 weeks. Discussed medication risks and benefits of ketoconazole with patient in detail with patient verbal understanding. Patient fully understands and is agreeable with plan of care, at this point patient will follow up as needed unless acute concerns arise in the meantime.  Plan: PRIMARY CARE FOLLOW-UP SCHEDULING, ketoconazole (NIZORAL) 2 % external cream    BMI  Estimated body mass index is 34.23 kg/m  as calculated from the following:    Height as of this encounter: 1.651 m (5' 5\").    Weight as of this encounter: 93.3 kg (205 lb 11.2 oz).       Jenny Robertson is a 74 year old, presenting for the following health issues:  Derm Problem (Spots on back and breast)        6/12/2024     2:52 PM   Additional Questions   Roomed by Courtney Saldivar     History of Present Illness       Reason for visit:  Shin check  Symptom onset:  3-4 weeks ago  Symptom intensity:  Mild  Symptom progression:  Worsening  Had these symptoms before:  No  What makes it worse:  No  What makes it better:  No    She eats 2-3 servings of fruits and vegetables daily.She consumes 0 sweetened beverage(s) daily.She exercises with enough effort to increase her heart rate 9 or less minutes per day.  She exercises with enough effort to increase her heart rate 3 or less days per week.   She is taking medications regularly.     Skin " "Lesion  Onset/Duration: 1 yr  Description  Location: circular   Color: Red  Border description: raised, red, scaly  Character: round, raised, red  Itching: some/mild  Bleeding:  YES- 1 week ago, possible due to being scratched  Intensity:  moderate  Progression of Symptoms:  worsening and constant  Accompanying signs and symptoms:   Bleeding: some  Scaling: rough  Excessive sun exposure/tanning: No  Sunscreen used: Pt uses sunscreen when in prolonged sun exposed.   History:           Any previous history of skin cancer: Brother had possible spots removed  Any family history of melanoma: No  Previous episodes of similar lesion: No  Precipitating or alleviating factors: none  Therapies tried and outcome: hydrocortisone cream -  not effective    Review of Systems  Constitutional, HEENT, cardiovascular, pulmonary, GI, , musculoskeletal, neuro, skin, endocrine and psych systems are negative, except as otherwise noted.      Objective    BP (!) 140/76 (BP Location: Right arm, Patient Position: Sitting, Cuff Size: Adult Regular)   Pulse 86   Temp 98.2  F (36.8  C) (Oral)   Resp 14   Ht 1.651 m (5' 5\")   Wt 93.3 kg (205 lb 11.2 oz)   LMP  (LMP Unknown)   SpO2 97%   BMI 34.23 kg/m    Body mass index is 34.23 kg/m .  Physical Exam  Vitals and nursing note reviewed.   Constitutional:       General: She is not in acute distress.     Appearance: Normal appearance. She is not ill-appearing.   Cardiovascular:      Rate and Rhythm: Normal rate.   Pulmonary:      Effort: Pulmonary effort is normal.   Skin:     General: Skin is warm and dry.      Findings: Lesion and rash present.      Comments: See picture for details   Neurological:      Mental Status: She is alert.   Psychiatric:         Mood and Affect: Mood normal.         Behavior: Behavior normal.         Thought Content: Thought content normal.         Judgment: Judgment normal.        Signed Electronically by: JUNI River CNP    "

## 2024-06-12 NOTE — Clinical Note
Marina Mackey, I saw Ailyn today for a skin lesion under the left breast. Seems possible seborrheic keratosis but shared decision to biopsy for confirmation. She has a bit of a rash surrounding so treating for fungal prior to biopsy. She requested to have you for the visit, should be procedure only visit. Let me know if you have any questions.  JUNI River CNP

## 2024-06-26 ENCOUNTER — OFFICE VISIT (OUTPATIENT)
Dept: FAMILY MEDICINE | Facility: CLINIC | Age: 74
End: 2024-06-26
Payer: COMMERCIAL

## 2024-06-26 ENCOUNTER — TELEPHONE (OUTPATIENT)
Dept: UROLOGY | Facility: CLINIC | Age: 74
End: 2024-06-26

## 2024-06-26 VITALS
BODY MASS INDEX: 34.39 KG/M2 | HEIGHT: 65 IN | WEIGHT: 206.41 LBS | HEART RATE: 78 BPM | SYSTOLIC BLOOD PRESSURE: 132 MMHG | DIASTOLIC BLOOD PRESSURE: 75 MMHG | TEMPERATURE: 98 F | OXYGEN SATURATION: 96 % | RESPIRATION RATE: 12 BRPM

## 2024-06-26 DIAGNOSIS — L82.1 SEBORRHEIC KERATOSES: Primary | ICD-10-CM

## 2024-06-26 DIAGNOSIS — L98.9 SKIN LESION: ICD-10-CM

## 2024-06-26 DIAGNOSIS — R10.9 FLANK PAIN: ICD-10-CM

## 2024-06-26 PROCEDURE — 99213 OFFICE O/P EST LOW 20 MIN: CPT | Performed by: PHYSICIAN ASSISTANT

## 2024-06-26 NOTE — TELEPHONE ENCOUNTER
----- Message from Dayna MAHMOOD sent at 5/14/2024  2:01 PM CDT -----  Regarding: 3 month follow up  Return in about 3 months (around 8/14/2024) for MRI, video visit for results.    CARLIE  5/14/24

## 2024-07-19 ENCOUNTER — TELEPHONE (OUTPATIENT)
Dept: UROLOGY | Facility: CLINIC | Age: 74
End: 2024-07-19
Payer: COMMERCIAL

## 2024-08-12 ENCOUNTER — HOSPITAL ENCOUNTER (OUTPATIENT)
Dept: MRI IMAGING | Facility: CLINIC | Age: 74
Discharge: HOME | End: 2024-08-12
Attending: UROLOGY | Admitting: UROLOGY
Payer: COMMERCIAL

## 2024-08-12 DIAGNOSIS — N28.1 ACQUIRED CYST OF KIDNEY: ICD-10-CM

## 2024-08-12 PROCEDURE — 255N000002 HC RX 255 OP 636: Performed by: UROLOGY

## 2024-08-12 PROCEDURE — A9585 GADOBUTROL INJECTION: HCPCS | Performed by: UROLOGY

## 2024-08-12 PROCEDURE — 74183 MRI ABD W/O CNTR FLWD CNTR: CPT

## 2024-08-12 RX ORDER — GADOBUTROL 604.72 MG/ML
0.1 INJECTION INTRAVENOUS ONCE
Status: COMPLETED | OUTPATIENT
Start: 2024-08-12 | End: 2024-08-12

## 2024-08-12 RX ADMIN — GADOBUTROL 10 ML: 604.72 INJECTION INTRAVENOUS at 15:43

## 2024-08-16 ENCOUNTER — OFFICE VISIT (OUTPATIENT)
Dept: UROLOGY | Facility: CLINIC | Age: 74
End: 2024-08-16
Payer: COMMERCIAL

## 2024-08-16 VITALS
SYSTOLIC BLOOD PRESSURE: 153 MMHG | HEIGHT: 65 IN | BODY MASS INDEX: 32.65 KG/M2 | WEIGHT: 196 LBS | HEART RATE: 91 BPM | DIASTOLIC BLOOD PRESSURE: 84 MMHG

## 2024-08-16 DIAGNOSIS — N28.1 ACQUIRED CYST OF KIDNEY: Primary | ICD-10-CM

## 2024-08-16 PROCEDURE — 99214 OFFICE O/P EST MOD 30 MIN: CPT | Performed by: UROLOGY

## 2024-08-16 ASSESSMENT — PAIN SCALES - GENERAL: PAINLEVEL: MILD PAIN (3)

## 2024-08-16 NOTE — LETTER
"8/16/2024       RE: Ailyn Beck  [address]  [facility] [address]     Dear Colleague,    Thank you for referring your patient, Ailyn Beck, to the [facility] [place] at [facility]. Please see a copy of my visit note below.    Office Visit Note  [facility]  [phone]    Aug 16, 2024    [unfilled]    1950    UROLOGIC DIAGNOSES:    Renal cysts    CURRENT INTERVENTIONS:        History:    Ailyn had a follow-up MRI of her kidneys performed.      Imaging: I personally reviewed her MRI images.  The renal cyst located anteriorly in her left kidney has a somewhat thickened septation present.    Labs:      MEDICATIONS:    Current Outpatient Medications:      atenolol (TENORMIN) 25 MG tablet, Take 1 tablet (25 mg) by mouth daily, Disp: 90 tablet, Rfl: 3     ibuprofen (ADVIL/MOTRIN) 800 MG tablet, Take 1 tablet (800 mg) by mouth every 8 hours as needed for moderate pain, Disp: 90 tablet, Rfl: 0     triamterene-HCTZ (DYAZIDE) 37.5-25 MG capsule, Take 1 capsule by mouth every evening, Disp: 90 capsule, Rfl: 1     atorvastatin (LIPITOR) 20 MG tablet, Take 1 tablet (20 mg) by mouth daily (Patient not taking: Reported on 6/12/2024), Disp: 90 tablet, Rfl: 3     ketoconazole (NIZORAL) 2 % external cream, Apply topically daily (Patient not taking: Reported on 8/16/2024), Disp: 30 g, Rfl: 0    ALLERGIES:   No Known Allergies    REVIEW OF SYSTEMS: Ten point review of systems without change as outlined in HPI    SURGICAL HISTORY:    Past Surgical History:   Procedure Laterality Date     [place] TOTAL HIP ARTHROPLASTY      right     XR ELBOW SURGERY VITALIY RIGHT           PHYSICAL EXAM:    BP (!) 153/84   Pulse 91   Ht 1.651 m (5' 5\")   Wt 88.9 kg (196 lb)   LMP  (LMP Unknown)   BMI 32.62 kg/m      Constitutional: Well developed. Conversant and in no acute distress  Eyes: Anicteric sclera, conjunctiva clear, normal extraocular " movements  ENT: Normocephalic and atraumatic,   Skin: Warm and dry. No rashes or lesions  Cardiac: No peripheral edema  Back/Flank: Not done  CNS/PNS: Normal musculature and movements, moves all extremities normally  Respiratory: Normal non-labored breathing  Abdomen: Soft nontender and nondistended  Peripheral Vascular: No peripheral edema  Mental Status/Psych: Alert and Oriented x 3. Normal mood and affect      External Genitalia: Not done  Bladder: Not done  Urethra: Not done   Vagina: Not done    Cystoscopy:  Not Done      Urinalysis:  UA RESULTS:  Recent Labs   Lab Test 04/09/24  1418   COLOR Yellow   APPEARANCE Clear   URINEGLC Negative   URINEBILI Negative   URINEKETONE Negative   SG 1.020   UBLD Trace*   URINEPH 5.5   PROTEIN Negative   UROBILINOGEN 0.2   NITRITE Negative   LEUKEST Negative   RBCU 0-2   WBCU 0-5         IMPRESSION:    Renal cysts    PLAN:    We discussed her MRI results.  The renal cysts are unchanged in size.  The cyst located anterior left kidney does have a slightly thickened septation, but it is a very small cyst.  I agree that it should be monitored.  I recommended annual surveillance for the next 3 years.  If after 3 years there are no changes she can probably stop surveillance at that time.  I will have her follow-up with our advanced practice provider on an annual basis for MRI surveillance of the renal cyst.      Michael Adams M.D.          Again, thank you for allowing me to participate in the care of your patient.      Sincerely,    Michael Adams MD

## 2024-08-16 NOTE — NURSING NOTE
Chief Complaint   Patient presents with    Renal cyst     Review MRI     Pt denies gross hematuria or dysuria.    Kallie Huber, EMT

## 2024-08-16 NOTE — PROGRESS NOTES
"Office Visit Note  [facility]  [phone]    Aug 16, 2024    [unfilled]    1950    UROLOGIC DIAGNOSES:    Renal cysts    CURRENT INTERVENTIONS:        History:    Ailyn had a follow-up MRI of her kidneys performed.      Imaging: I personally reviewed her MRI images.  The renal cyst located anteriorly in her left kidney has a somewhat thickened septation present.    Labs:      MEDICATIONS:    Current Outpatient Medications:     atenolol (TENORMIN) 25 MG tablet, Take 1 tablet (25 mg) by mouth daily, Disp: 90 tablet, Rfl: 3    ibuprofen (ADVIL/MOTRIN) 800 MG tablet, Take 1 tablet (800 mg) by mouth every 8 hours as needed for moderate pain, Disp: 90 tablet, Rfl: 0    triamterene-HCTZ (DYAZIDE) 37.5-25 MG capsule, Take 1 capsule by mouth every evening, Disp: 90 capsule, Rfl: 1    atorvastatin (LIPITOR) 20 MG tablet, Take 1 tablet (20 mg) by mouth daily (Patient not taking: Reported on 6/12/2024), Disp: 90 tablet, Rfl: 3    ketoconazole (NIZORAL) 2 % external cream, Apply topically daily (Patient not taking: Reported on 8/16/2024), Disp: 30 g, Rfl: 0    ALLERGIES:   No Known Allergies    REVIEW OF SYSTEMS: Ten point review of systems without change as outlined in HPI    SURGICAL HISTORY:    Past Surgical History:   Procedure Laterality Date    [place] TOTAL HIP ARTHROPLASTY      right    XR ELBOW SURGERY VITALIY RIGHT           PHYSICAL EXAM:    BP (!) 153/84   Pulse 91   Ht 1.651 m (5' 5\")   Wt 88.9 kg (196 lb)   LMP  (LMP Unknown)   BMI 32.62 kg/m      Constitutional: Well developed. Conversant and in no acute distress  Eyes: Anicteric sclera, conjunctiva clear, normal extraocular movements  ENT: Normocephalic and atraumatic,   Skin: Warm and dry. No rashes or lesions  Cardiac: No peripheral edema  Back/Flank: Not done  CNS/PNS: Normal musculature and movements, moves all extremities normally  Respiratory: Normal non-labored breathing  Abdomen: Soft nontender and nondistended  Peripheral Vascular: " No peripheral edema  Mental Status/Psych: Alert and Oriented x 3. Normal mood and affect      External Genitalia: Not done  Bladder: Not done  Urethra: Not done   Vagina: Not done    Cystoscopy:  Not Done      Urinalysis:  UA RESULTS:  Recent Labs   Lab Test 04/09/24  1418   COLOR Yellow   APPEARANCE Clear   URINEGLC Negative   URINEBILI Negative   URINEKETONE Negative   SG 1.020   UBLD Trace*   URINEPH 5.5   PROTEIN Negative   UROBILINOGEN 0.2   NITRITE Negative   LEUKEST Negative   RBCU 0-2   WBCU 0-5         IMPRESSION:    Renal cysts    PLAN:    We discussed her MRI results.  The renal cysts are unchanged in size.  The cyst located anterior left kidney does have a slightly thickened septation, but it is a very small cyst.  I agree that it should be monitored.  I recommended annual surveillance for the next 3 years.  If after 3 years there are no changes she can probably stop surveillance at that time.  I will have her follow-up with our advanced practice provider on an annual basis for MRI surveillance of the renal cyst.      Michael Adams M.D.

## 2024-08-27 ENCOUNTER — TELEPHONE (OUTPATIENT)
Dept: FAMILY MEDICINE | Facility: CLINIC | Age: 74
End: 2024-08-27
Payer: COMMERCIAL

## 2024-08-27 NOTE — TELEPHONE ENCOUNTER
Patient Quality Outreach    Patient is due for the following:   Diabetes -  Microalbumin  Colon Cancer Screening  Breast Cancer Screening - Mammogram    Next Steps:   Patient has upcoming appointment, these items will be addressed at that time. Patient is scheduled on 10/21/24    Type of outreach:    Chart review performed, no outreach needed.      Questions for provider review:    None           Maye Atkins MA

## 2024-10-09 ENCOUNTER — ANCILLARY PROCEDURE (OUTPATIENT)
Dept: GENERAL RADIOLOGY | Facility: CLINIC | Age: 74
End: 2024-10-09
Attending: FAMILY MEDICINE
Payer: COMMERCIAL

## 2024-10-09 ENCOUNTER — OFFICE VISIT (OUTPATIENT)
Dept: ORTHOPEDICS | Facility: CLINIC | Age: 74
End: 2024-10-09
Payer: COMMERCIAL

## 2024-10-09 VITALS
DIASTOLIC BLOOD PRESSURE: 66 MMHG | SYSTOLIC BLOOD PRESSURE: 130 MMHG | HEIGHT: 65 IN | WEIGHT: 200 LBS | BODY MASS INDEX: 33.32 KG/M2

## 2024-10-09 DIAGNOSIS — M79.672 BILATERAL FOOT PAIN: ICD-10-CM

## 2024-10-09 DIAGNOSIS — M79.671 BILATERAL FOOT PAIN: Primary | ICD-10-CM

## 2024-10-09 DIAGNOSIS — M19.071 PRIMARY OSTEOARTHRITIS OF BOTH FEET: ICD-10-CM

## 2024-10-09 DIAGNOSIS — M19.072 PRIMARY OSTEOARTHRITIS OF BOTH FEET: ICD-10-CM

## 2024-10-09 DIAGNOSIS — M79.671 BILATERAL FOOT PAIN: ICD-10-CM

## 2024-10-09 DIAGNOSIS — M79.672 BILATERAL FOOT PAIN: Primary | ICD-10-CM

## 2024-10-09 PROCEDURE — 99204 OFFICE O/P NEW MOD 45 MIN: CPT | Performed by: FAMILY MEDICINE

## 2024-10-09 PROCEDURE — 73630 X-RAY EXAM OF FOOT: CPT | Mod: TC | Performed by: FAMILY MEDICINE

## 2024-10-09 RX ORDER — DICLOFENAC SODIUM 75 MG/1
75 TABLET, DELAYED RELEASE ORAL 2 TIMES DAILY PRN
Qty: 60 TABLET | Refills: 0 | Status: SHIPPED | OUTPATIENT
Start: 2024-10-09

## 2024-10-09 NOTE — PATIENT INSTRUCTIONS
1. Bilateral foot pain    2. Primary osteoarthritis of both feet      -Patient has bilateral foot pain due to arthritis  -Patient will start formal physical therapy and home exercise program  -An order was placed for custom orthotics  -Patient may purchase over-the-counter shoe inserts with a metatarsal pad to offload the MTP joints  -Patient will start diclofenac 75 mg twice daily for the next 2 weeks and then on an as-needed basis as pain improves.  Patient may also continue with Tylenol for breakthrough pain  -Patient will follow-up in 4 weeks for reevaluation and progression of activity.  -Call direct clinic number [[phone]] at any time with questions or concerns.    Albert Yeo MD [facility]  [facility]

## 2024-10-09 NOTE — LETTER
10/9/2024      Ailyn Beck  [address] [facility] [address]      Dear Colleague,    Thank you for referring your patient, Ailyn Beck, to the [facility] [place]. Please see a copy of my visit note below.    ASSESSMENT & PLAN  Patient Instructions     1. Bilateral foot pain    2. Primary osteoarthritis of both feet      -Patient has bilateral foot pain due to arthritis  -Patient will start formal physical therapy and home exercise program  -An order was placed for custom orthotics  -Patient may purchase over-the-counter shoe inserts with a metatarsal pad to offload the MTP joints  -Patient will start diclofenac 75 mg twice daily for the next 2 weeks and then on an as-needed basis as pain improves.  Patient may also continue with Tylenol for breakthrough pain  -Patient will follow-up in 4 weeks for reevaluation and progression of activity.  -Call direct clinic number [[phone]] at any time with questions or concerns.    Albert Yeo MD [facility]  [facility] [place]        -----    SUBJECTIVE  Ailyn Beck is a/an 74 year old female who is seen as a self referral for evaluation of bilateral foot pain (R>L). The patient is seen by themselves.    Onset: 2 month(s) ago. Reports insidious onset without acute precipitating event.  Location of Pain: bilateral MTP joints  Rating of Pain at worst: 8/10  Rating of Pain Currently: 5/10  Worsened by: walking  Better with: rest / activity avoidance  Treatments tried: rest/activity avoidance, Tylenol, and Ibuprofen  Associated symptoms: no distal numbness or tingling; denies swelling or warmth  Orthopedic history: YES - patient reports intermittent h/o gout in ankles and feet/toes  Relevant surgical history: NO  Social history: social history: retired, traveling to [place] next month    Past Medical History:   Diagnosis Date     Elevated fasting glucose      Gout       Hyperlipidemia      Hypertension      Vitiligo      Social History     Socioeconomic History     Marital status:      Spouse name: Pj     Number of children: 4   Occupational History     Occupation: [employer] /     Employer: [employer]   Tobacco Use     Smoking status: Never     Smokeless tobacco: Never   Vaping Use     Vaping status: Never Used   Substance and Sexual Activity     Alcohol use: Not Currently     Drug use: Never     Sexual activity: Not Currently     Partners: Male     Social Determinants of Health     Financial Resource Strain: Low Risk  (10/4/2023)    Financial Resource Strain      Within the past 12 months, have you or your family members you live with been unable to get utilities (heat, electricity) when it was really needed?: No   Food Insecurity: Low Risk  (10/4/2023)    Food Insecurity      Within the past 12 months, did you worry that your food would run out before you got money to buy more?: No      Within the past 12 months, did the food you bought just not last and you didn t have money to get more?: No   Transportation Needs: Low Risk  (10/4/2023)    Transportation Needs      Within the past 12 months, has lack of transportation kept you from medical appointments, getting your medicines, non-medical meetings or appointments, work, or from getting things that you need?: No   Physical Activity: Unknown (10/4/2023)    Exercise Vital Sign      Days of Exercise per Week: 1 day   Stress: No Stress Concern Present (10/4/2023)    [employer] [place] [employer] - Occupational Stress Questionnaire      Feeling of Stress : Only a little   Social Connections: Unknown (10/4/2023)    Social Connection and Isolation Panel [NHANES]      Frequency of Communication with Friends and Family: More than three times a week      Active Member of Clubs or Organizations: No      Attends Club or Organization Meetings: Never      Marital Status:    Interpersonal Safety:  "Low Risk  (4/9/2024)    Interpersonal Safety      Do you feel physically and emotionally safe where you currently live?: Yes      Within the past 12 months, have you been hit, slapped, kicked or otherwise physically hurt by someone?: No      Within the past 12 months, have you been humiliated or emotionally abused in other ways by your partner or ex-partner?: No   Housing Stability: Low Risk  (10/4/2023)    Housing Stability      Do you have housing? : Yes      Are you worried about losing your housing?: No         Patient's past medical, surgical, social, and family histories were reviewed today and no changes are noted.    REVIEW OF SYSTEMS:  10 point ROS is negative other than symptoms noted above in HPI, Past Medical History or as stated below  Constitutional: NEGATIVE for fever, chills, change in weight  Skin: NEGATIVE for worrisome rashes, moles or lesions  GI/: NEGATIVE for bowel or bladder changes  Neuro: NEGATIVE for weakness, dizziness or paresthesias    OBJECTIVE:  /66   Ht 1.651 m (5' 5\")   Wt 90.7 kg (200 lb)   LMP  (LMP Unknown)   BMI 33.28 kg/m     General: healthy, alert and in no distress  HEENT: no scleral icterus or conjunctival erythema  Skin: no suspicious lesions or rash. No jaundice.  CV:  no pedal edema  Resp: normal respiratory effort without conversational dyspnea   Psych: normal mood and affect  Gait: normal steady gait with appropriate coordination and balance  Neuro: Normal light sensory exam of lower extremity  MSK:  BILATERAL FOOT  Inspection:    no swelling or ecchymosis  Palpation:    Tender about the 2nd, 3rd, 4th, 5th MTP joints. Otherwise remainder of bony/ligamentous landmarks are non-tender.  Range of Motion:     Grossly intact and non-painful  Strength:    Grossly intact in all planes  Special Tests:    Positive: MTP stress      Independent visualization of the below image:  Recent Results (from the past 24 hour(s))   XR Foot Bilateral G/E 3 Views    Narrative    " Moderate right, mild left first MTP and IP joint space narrowing with   osteophytes, moderate to severe degenerative changes of entire midfoot,   fights at the origin of the plantar fascia and insertion of Achilles   tendon.  No acute fracture or dislocation.             Albert Yeo MD [facility]       Again, thank you for allowing me to participate in the care of your patient.        Sincerely,        Albert Yeo, MD

## 2024-10-09 NOTE — PROGRESS NOTES
ASSESSMENT & PLAN  Patient Instructions     1. Bilateral foot pain    2. Primary osteoarthritis of both feet      -Patient has bilateral foot pain due to arthritis  -Patient will start formal physical therapy and home exercise program  -An order was placed for custom orthotics  -Patient may purchase over-the-counter shoe inserts with a metatarsal pad to offload the MTP joints  -Patient will start diclofenac 75 mg twice daily for the next 2 weeks and then on an as-needed basis as pain improves.  Patient may also continue with Tylenol for breakthrough pain  -Patient will follow-up in 4 weeks for reevaluation and progression of activity.  -Call direct clinic number [[phone]] at any time with questions or concerns.    Albert Yeo MD [facility]  [facility]        -----    SUBJECTIVE  Ailyn Beck is a/an 74 year old female who is seen as a self referral for evaluation of bilateral foot pain (R>L). The patient is seen by themselves.    Onset: 2 month(s) ago. Reports insidious onset without acute precipitating event.  Location of Pain: bilateral MTP joints  Rating of Pain at worst: 8/10  Rating of Pain Currently: 5/10  Worsened by: walking  Better with: rest / activity avoidance  Treatments tried: rest/activity avoidance, Tylenol, and Ibuprofen  Associated symptoms: no distal numbness or tingling; denies swelling or warmth  Orthopedic history: YES - patient reports intermittent h/o gout in ankles and feet/toes  Relevant surgical history: NO  Social history: social history: retired, traveling to [place] next month    Past Medical History:   Diagnosis Date    Elevated fasting glucose     Gout     Hyperlipidemia     Hypertension     Vitiligo      Social History     Socioeconomic History    Marital status:      Spouse name: Pj    Number of children: 4   Occupational History    Occupation: creditialing /     Employer: [employer]   Tobacco  Use    Smoking status: Never    Smokeless tobacco: Never   Vaping Use    Vaping status: Never Used   Substance and Sexual Activity    Alcohol use: Not Currently    Drug use: Never    Sexual activity: Not Currently     Partners: Male     Social Determinants of Health     Financial Resource Strain: Low Risk  (10/4/2023)    Financial Resource Strain     Within the past 12 months, have you or your family members you live with been unable to get utilities (heat, electricity) when it was really needed?: No   Food Insecurity: Low Risk  (10/4/2023)    Food Insecurity     Within the past 12 months, did you worry that your food would run out before you got money to buy more?: No     Within the past 12 months, did the food you bought just not last and you didn t have money to get more?: No   Transportation Needs: Low Risk  (10/4/2023)    Transportation Needs     Within the past 12 months, has lack of transportation kept you from medical appointments, getting your medicines, non-medical meetings or appointments, work, or from getting things that you need?: No   Physical Activity: Unknown (10/4/2023)    Exercise Vital Sign     Days of Exercise per Week: 1 day   Stress: No Stress Concern Present (10/4/2023)    [place] [place] [employer] - Occupational Stress Questionnaire     Feeling of Stress : Only a little   Social Connections: Unknown (10/4/2023)    Social Connection and Isolation Panel [NHANES]     Frequency of Communication with Friends and Family: More than three times a week     Active Member of Clubs or Organizations: No     Attends Club or Organization Meetings: Never     Marital Status:    Interpersonal Safety: Low Risk  (4/9/2024)    Interpersonal Safety     Do you feel physically and emotionally safe where you currently live?: Yes     Within the past 12 months, have you been hit, slapped, kicked or otherwise physically hurt by someone?: No     Within the past 12 months, have you been humiliated  "or emotionally abused in other ways by your partner or ex-partner?: No   Housing Stability: Low Risk  (10/4/2023)    Housing Stability     Do you have housing? : Yes     Are you worried about losing your housing?: No         Patient's past medical, surgical, social, and family histories were reviewed today and no changes are noted.    REVIEW OF SYSTEMS:  10 point ROS is negative other than symptoms noted above in HPI, Past Medical History or as stated below  Constitutional: NEGATIVE for fever, chills, change in weight  Skin: NEGATIVE for worrisome rashes, moles or lesions  GI/: NEGATIVE for bowel or bladder changes  Neuro: NEGATIVE for weakness, dizziness or paresthesias    OBJECTIVE:  /66   Ht 1.651 m (5' 5\")   Wt 90.7 kg (200 lb)   LMP  (LMP Unknown)   BMI 33.28 kg/m     General: healthy, alert and in no distress  HEENT: no scleral icterus or conjunctival erythema  Skin: no suspicious lesions or rash. No jaundice.  CV:  no pedal edema  Resp: normal respiratory effort without conversational dyspnea   Psych: normal mood and affect  Gait: normal steady gait with appropriate coordination and balance  Neuro: Normal light sensory exam of lower extremity  MSK:  BILATERAL FOOT  Inspection:    no swelling or ecchymosis  Palpation:    Tender about the 2nd, 3rd, 4th, 5th MTP joints. Otherwise remainder of bony/ligamentous landmarks are non-tender.  Range of Motion:     Grossly intact and non-painful  Strength:    Grossly intact in all planes  Special Tests:    Positive: MTP stress      Independent visualization of the below image:  Recent Results (from the past 24 hour(s))   XR Foot Bilateral G/E 3 Views    Narrative    Moderate right, mild left first MTP and IP joint space narrowing with   osteophytes, moderate to severe degenerative changes of entire midfoot,   fights at the origin of the plantar fascia and insertion of Achilles   tendon.  No acute fracture or dislocation.             Albert Yeo MD " [facility]

## 2024-10-10 DIAGNOSIS — I10 BENIGN ESSENTIAL HYPERTENSION: ICD-10-CM

## 2024-10-10 ASSESSMENT — ACTIVITIES OF DAILY LIVING (ADL)
PUTTING_ON_YOUR_SHOES_OR_SOCKS: QUITE A BIT OF DIFFICULTY
PLEASE_INDICATE_YOR_PRIMARY_REASON_FOR_REFERRAL_TO_THERAPY:: FOOT AND/OR ANKLE
SITTING_FOR_1_HOUR: NO DIFFICULTY
WALKING_A_MILE: EXTREME DIFFICULTY OR UNABLE TO PERFORM ACTIVITY
STANDING_FOR_1_HOUR: QUITE A BIT OF DIFFICULTY
LEFS_SCORE(%): INCOMPLETE
RUNNING_ON_EVEN_GROUND: EXTREME DIFFICULTY OR UNABLE TO PERFORM ACTIVITY
RUNNING_ON_UNEVEN_GROUND: EXTREME DIFFICULTY OR UNABLE TO PERFORM ACTIVITY
ANY_OF_YOUR_USUAL_WORK,_HOUSEWORK_OR_SCHOOL_ACTIVITIES: MODERATE DIFFICULTY
LIFTING_AN_OBJECT,_LIKE_A_BAG_OF_GROCERIES_FROM_THE_FLOOR: MODERATE DIFFICULTY
GETTING_INTO_OR_OUT_OF_A_CAR: A LITTLE BIT OF DIFFICULTY
SQUATTING: EXTREME DIFFICULTY OR UNABLE TO PERFORM ACTIVITY
PERFORMING_LIGHT_ACTIVITIES_AROUND_YOUR_HOME: A LITTLE BIT OF DIFFICULTY
ROLLING_OVER_IN_BED: A LITTLE BIT OF DIFFICULTY
SHOPPING: EXTREME DIFFICULTY OR UNABLE TO PERFORM ACTIVITY
LEFS_RAW_SCORE: INCOMPLETE
PERFORMING_HEAVY_ACTIVITIES_AROUND_YOUR_HOME: QUITE A BIT OF DIFFICULTY
WALKING_BETWEEN_ROOMS: A LITTLE BIT OF DIFFICULTY
YOUR_USUAL_HOBBIES,_RECREATIONAL_OR_SPORTING_ACTIVITIES: QUITE A BIT OF DIFFICULTY
MAKING_SHARP_TURNS_WHILE_RUNNING_FAST: EXTREME DIFFICULTY OR UNABLE TO PERFORM ACTIVITY
WALKING_2_BLOCKS: A LITTLE BIT OF DIFFICULTY
GOING_UP_OR_DOWN_10_STAIRS: QUITE A BIT OF DIFFICULTY

## 2024-10-11 ENCOUNTER — THERAPY VISIT (OUTPATIENT)
Dept: PHYSICAL THERAPY | Facility: CLINIC | Age: 74
End: 2024-10-11
Attending: FAMILY MEDICINE
Payer: COMMERCIAL

## 2024-10-11 DIAGNOSIS — M19.072 PRIMARY OSTEOARTHRITIS OF BOTH FEET: ICD-10-CM

## 2024-10-11 DIAGNOSIS — M19.071 PRIMARY OSTEOARTHRITIS OF BOTH FEET: ICD-10-CM

## 2024-10-11 DIAGNOSIS — M79.671 BILATERAL FOOT PAIN: ICD-10-CM

## 2024-10-11 DIAGNOSIS — M79.672 BILATERAL FOOT PAIN: ICD-10-CM

## 2024-10-11 PROCEDURE — 97110 THERAPEUTIC EXERCISES: CPT | Mod: GP | Performed by: PHYSICAL THERAPIST

## 2024-10-11 PROCEDURE — 97161 PT EVAL LOW COMPLEX 20 MIN: CPT | Mod: GP | Performed by: PHYSICAL THERAPIST

## 2024-10-11 RX ORDER — ATENOLOL 25 MG/1
25 TABLET ORAL DAILY
Qty: 90 TABLET | Refills: 0 | Status: SHIPPED | OUTPATIENT
Start: 2024-10-11 | End: 2024-10-30

## 2024-10-11 NOTE — PROGRESS NOTES
PHYSICAL THERAPY EVALUATION  Type of Visit: Evaluation       Fall Risk Screen:  Fall screen completed by: PT  Have you fallen 2 or more times in the past year?: No  Have you fallen and had an injury in the past year?: No  Is patient a fall risk?: No    Subjective       Pt reporting B foot pain (R>L) starting about two months ago. Pt reports Increased pain in feet the longer she walks, especially at MTP joints. Got prescription for anti inflammatory but hasn't picked it up yet, also has order for custom orthotics. Did get inserts that help to support MTPs and that has helped a little bit. X-ray showing extensive arthritis throughout feet. 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 Pt bought a recumbent bike to use at home to help her actiivty level. Of note, pt reports her feet have grown 3.5 sizes over her adult life and shoes that fit her a few years ago are now too small. Pt going to [place] the end of November and hoping to be able to walk more at that time.     Finally, pt also endorsing R hip pain especially in anterior hip with any hip flexion. She does have history of R ASHLEY, thinking about getting this checked out at [facility].     Presenting condition or subjective complaint: Foot pain when walking.  Date of onset: 10/09/24 ([facility] order)    Relevant medical history: Arthritis; High blood pressure; Overweight   Dates & types of surgery: 2011 Right hip replacement    Prior diagnostic imaging/testing results: X-ray       XRay results: Moderate right, mild left first MTP and IP joint space narrowing with osteophytes, moderate to severe degenerative changes of entire midfoot, fights at the origin of the plantar fascia and insertion of Achilles tendon.  No acute fracture or dislocation.     Prior therapy history for the same diagnosis, illness or injury: No      Living Environment  Social support: With family members   [facility]   Stairs to enter the home: No       Ramp: No   Stairs inside the home: No       Help at home: None  Equipment owned:       Employment: Yes   Hobbies/Interests: Knitting. Reading. Swimming    Patient goals for therapy: Walk further.    Pain assessment: Pain present     Objective   FOOT/ANKLE EVALUATION  PAIN: Pain Level at Rest: 0/10  Pain Level with Use: 8/10 at first, now generally 4/10  INTEGUMENTARY (edema, incisions): WNL  GAIT:   Weightbearing Status: WBAT  Assistive Device(s): None  Gait Deviations:  pt ambulates with flat foot contact, slow speed, and very short step length  BALANCE/PROPRIOCEPTION: Single Leg Stance Eyes Open (seconds): 1-2 second each side   WEIGHT BEARING ALIGNMENT:  fair arch support in standing, more rigid foot  ROM:  Reduced motion at MTPs especially into flexion, ankle ROM WNL     Assessment & Plan   CLINICAL IMPRESSIONS  Medical Diagnosis: B foot pain    Treatment Diagnosis: R>L foot pain   Impression/Assessment: Patient is a 74 year old female with R>L foot complaints.  The following significant findings have been identified: Pain, Decreased ROM/flexibility, Decreased joint mobility, Decreased strength, Impaired balance, Impaired gait, Impaired muscle performance, and Decreased activity tolerance. These impairments interfere with their ability to perform self care tasks, recreational activities, household chores, household mobility, and community mobility as compared to previous level of function.     Clinical Decision Making (Complexity):  Clinical Presentation: Stable/Uncomplicated  Clinical Presentation Rationale: based on medical and personal factors listed in PT evaluation  Clinical Decision Making (Complexity): Low complexity    PLAN OF CARE  Treatment Interventions:  Interventions: Gait Training, Manual Therapy, Neuromuscular Re-education, Therapeutic Activity, Therapeutic Exercise    Long Term Goals     PT Goal 1  Goal Identifier:  ambulation  Goal Description: pt will be able to ambulate 10 minutes without limitation of foot pain  Target Date: 01/03/25      Frequency of Treatment: once per week  Duration of Treatment: 12 weeks    Recommended Referrals to Other Professionals:  NA  Education Assessment:   Learner/Method: Patient  Education Comments: Pt educated on PT role and plan of care    Risks and benefits of evaluation/treatment have been explained.   Patient/Family/caregiver agrees with Plan of Care.     Evaluation Time:     PT Eval, Low Complexity Minutes (13555): 20     Signing Clinician: Jeni Sales PT

## 2024-10-13 ENCOUNTER — HEALTH MAINTENANCE LETTER (OUTPATIENT)
Age: 74
End: 2024-10-13

## 2024-10-21 ENCOUNTER — THERAPY VISIT (OUTPATIENT)
Dept: PHYSICAL THERAPY | Facility: CLINIC | Age: 74
End: 2024-10-21
Attending: FAMILY MEDICINE
Payer: COMMERCIAL

## 2024-10-21 DIAGNOSIS — M79.672 BILATERAL FOOT PAIN: Primary | ICD-10-CM

## 2024-10-21 DIAGNOSIS — M79.671 BILATERAL FOOT PAIN: Primary | ICD-10-CM

## 2024-10-21 DIAGNOSIS — M19.071 PRIMARY OSTEOARTHRITIS OF BOTH FEET: ICD-10-CM

## 2024-10-21 DIAGNOSIS — M19.072 PRIMARY OSTEOARTHRITIS OF BOTH FEET: ICD-10-CM

## 2024-10-21 PROCEDURE — 97112 NEUROMUSCULAR REEDUCATION: CPT | Mod: GP

## 2024-10-21 PROCEDURE — 97110 THERAPEUTIC EXERCISES: CPT | Mod: GP

## 2024-10-30 ENCOUNTER — OFFICE VISIT (OUTPATIENT)
Dept: FAMILY MEDICINE | Facility: CLINIC | Age: 74
End: 2024-10-30
Payer: COMMERCIAL

## 2024-10-30 VITALS
DIASTOLIC BLOOD PRESSURE: 75 MMHG | WEIGHT: 210.2 LBS | HEIGHT: 65 IN | BODY MASS INDEX: 35.02 KG/M2 | RESPIRATION RATE: 14 BRPM | SYSTOLIC BLOOD PRESSURE: 132 MMHG | TEMPERATURE: 97.8 F | OXYGEN SATURATION: 97 % | HEART RATE: 64 BPM

## 2024-10-30 DIAGNOSIS — M19.071 PRIMARY OSTEOARTHRITIS OF BOTH FEET: ICD-10-CM

## 2024-10-30 DIAGNOSIS — I10 BENIGN ESSENTIAL HYPERTENSION: ICD-10-CM

## 2024-10-30 DIAGNOSIS — M1A.09X0 CHRONIC GOUT OF MULTIPLE SITES, UNSPECIFIED CAUSE: ICD-10-CM

## 2024-10-30 DIAGNOSIS — Z23 NEED FOR COVID-19 VACCINE: ICD-10-CM

## 2024-10-30 DIAGNOSIS — N28.89 RENAL MASS, LEFT: ICD-10-CM

## 2024-10-30 DIAGNOSIS — Z12.31 VISIT FOR SCREENING MAMMOGRAM: ICD-10-CM

## 2024-10-30 DIAGNOSIS — Z00.00 ROUTINE GENERAL MEDICAL EXAMINATION AT A HEALTH CARE FACILITY: Primary | ICD-10-CM

## 2024-10-30 DIAGNOSIS — E55.9 VITAMIN D DEFICIENCY: ICD-10-CM

## 2024-10-30 DIAGNOSIS — E78.5 HYPERLIPIDEMIA, UNSPECIFIED HYPERLIPIDEMIA TYPE: ICD-10-CM

## 2024-10-30 DIAGNOSIS — Z23 NEEDS FLU SHOT: ICD-10-CM

## 2024-10-30 DIAGNOSIS — Z78.0 ASYMPTOMATIC POSTMENOPAUSAL STATUS: ICD-10-CM

## 2024-10-30 DIAGNOSIS — M25.551 HIP PAIN, RIGHT: ICD-10-CM

## 2024-10-30 DIAGNOSIS — N28.1 SIMPLE RENAL CYST: ICD-10-CM

## 2024-10-30 DIAGNOSIS — M19.072 PRIMARY OSTEOARTHRITIS OF BOTH FEET: ICD-10-CM

## 2024-10-30 DIAGNOSIS — R73.01 ELEVATED FASTING GLUCOSE: ICD-10-CM

## 2024-10-30 DIAGNOSIS — L80 VITILIGO: ICD-10-CM

## 2024-10-30 DIAGNOSIS — N18.31 STAGE 3A CHRONIC KIDNEY DISEASE (H): ICD-10-CM

## 2024-10-30 LAB
EST. AVERAGE GLUCOSE BLD GHB EST-MCNC: 103 MG/DL
HBA1C MFR BLD: 5.2 % (ref 0–5.6)
HGB BLD-MCNC: 12 G/DL (ref 11.7–15.7)

## 2024-10-30 PROCEDURE — 80061 LIPID PANEL: CPT | Performed by: NURSE PRACTITIONER

## 2024-10-30 PROCEDURE — 99397 PER PM REEVAL EST PAT 65+ YR: CPT | Mod: 25 | Performed by: NURSE PRACTITIONER

## 2024-10-30 PROCEDURE — 90662 IIV NO PRSV INCREASED AG IM: CPT | Performed by: NURSE PRACTITIONER

## 2024-10-30 PROCEDURE — 90480 ADMN SARSCOV2 VAC 1/ONLY CMP: CPT | Performed by: NURSE PRACTITIONER

## 2024-10-30 PROCEDURE — 82570 ASSAY OF URINE CREATININE: CPT | Performed by: NURSE PRACTITIONER

## 2024-10-30 PROCEDURE — 85018 HEMOGLOBIN: CPT | Performed by: NURSE PRACTITIONER

## 2024-10-30 PROCEDURE — 36415 COLL VENOUS BLD VENIPUNCTURE: CPT | Performed by: NURSE PRACTITIONER

## 2024-10-30 PROCEDURE — 99214 OFFICE O/P EST MOD 30 MIN: CPT | Mod: 25 | Performed by: NURSE PRACTITIONER

## 2024-10-30 PROCEDURE — 84550 ASSAY OF BLOOD/URIC ACID: CPT | Performed by: NURSE PRACTITIONER

## 2024-10-30 PROCEDURE — 91320 SARSCV2 VAC 30MCG TRS-SUC IM: CPT | Performed by: NURSE PRACTITIONER

## 2024-10-30 PROCEDURE — 82306 VITAMIN D 25 HYDROXY: CPT | Performed by: NURSE PRACTITIONER

## 2024-10-30 PROCEDURE — 82043 UR ALBUMIN QUANTITATIVE: CPT | Performed by: NURSE PRACTITIONER

## 2024-10-30 PROCEDURE — 83036 HEMOGLOBIN GLYCOSYLATED A1C: CPT | Performed by: NURSE PRACTITIONER

## 2024-10-30 PROCEDURE — G0008 ADMIN INFLUENZA VIRUS VAC: HCPCS | Performed by: NURSE PRACTITIONER

## 2024-10-30 RX ORDER — TRIAMTERENE AND HYDROCHLOROTHIAZIDE 37.5; 25 MG/1; MG/1
1 CAPSULE ORAL EVERY EVENING
Qty: 90 CAPSULE | Refills: 3 | Status: SHIPPED | OUTPATIENT
Start: 2024-10-30

## 2024-10-30 RX ORDER — ATENOLOL 25 MG/1
25 TABLET ORAL DAILY
Qty: 90 TABLET | Refills: 3 | Status: SHIPPED | OUTPATIENT
Start: 2024-10-30

## 2024-10-30 RX ORDER — ATORVASTATIN CALCIUM 20 MG/1
20 TABLET, FILM COATED ORAL DAILY
Qty: 90 TABLET | Refills: 3 | Status: SHIPPED | OUTPATIENT
Start: 2024-10-30

## 2024-10-30 SDOH — HEALTH STABILITY: PHYSICAL HEALTH: ON AVERAGE, HOW MANY DAYS PER WEEK DO YOU ENGAGE IN MODERATE TO STRENUOUS EXERCISE (LIKE A BRISK WALK)?: 1 DAY

## 2024-10-30 ASSESSMENT — SOCIAL DETERMINANTS OF HEALTH (SDOH): HOW OFTEN DO YOU GET TOGETHER WITH FRIENDS OR RELATIVES?: TWICE A WEEK

## 2024-10-30 ASSESSMENT — PAIN SCALES - GENERAL: PAINLEVEL_OUTOF10: MILD PAIN (3)

## 2024-10-30 NOTE — PROGRESS NOTES
Preventive Care Visit  [facility]  JUNI Jauregui CNP, Family Medicine  Oct 30, 2024          Assessment & Plan   (Z00.00) Routine general medical examination at a health care facility  (primary encounter diagnosis)  Comment: See patient instructions.   Plan: REVIEW OF HEALTH MAINTENANCE PROTOCOL ORDERS    (Z12.31) Visit for screening mammogram  Comment:   Plan: MA Screening Bilateral w/ [place]    (I10) Benign essential hypertension  Comment: Stable.  Continue with current medications.    Plan: atenolol (TENORMIN) 25 MG tablet, Hepatic         function panel, triamterene-HCTZ (DYAZIDE)         37.5-25 MG capsule    (E78.5) Hyperlipidemia, unspecified hyperlipidemia type  Comment: Recheck lipids and liver tests today.  Been on lipid med for 1 month.  Requesting labs.   Plan: atorvastatin (LIPITOR) 20 MG tablet, Hepatic         function panel, triamterene-HCTZ (DYAZIDE)         37.5-25 MG capsule    (N18.31) Stage 3a chronic kidney disease (H)  Comment: Stable.   Plan: Albumin Random Urine Quantitative with Creat         Ratio, Hemoglobin    (N28.1) Simple renal cyst  Comment:     (N28.89) Renal mass, left  Comment: Continue monitor.  Recheck in 6 months; due after 2-12-25.      (M25.551) Hip pain, right  Comment: Increased pain.  Requesting PT.    Plan: Vitamin D Deficiency, Physical Therapy          Referral    (Z78.0) Asymptomatic postmenopausal status  Comment:  Would like screening.   Plan: DEXA HIP/PELVIS/SPINE - Future    (L80) Vitiligo  Comment: Stable.  Doesn't go outside much.      (M19.071,  M19.072) Primary osteoarthritis of both feet  Comment: Follows Podiatry and PT.  Has new shoes and inserts.     (M1A.09X0) Chronic gout of multiple sites, unspecified cause  Comment: Recheck.   Plan: Uric acid    (R73.01) Elevated fasting glucose  Comment: Screen for diabetes.   Plan: Hemoglobin A1c    (E55.9) Vitamin D deficiency  Comment:   Plan: Vitamin D  "Deficiency    (Z23) Need for COVID-19 vaccine  Comment:   Plan: COVID-19 12+ (PFIZER)    (Z23) Needs flu shot  Comment:   Plan: INFLUENZA HIGH DOSE, TRIVALENT, PF (FLUZONE)       Patient instructions:   Shots  Labs.   Start PT for right hip.   Follow-up in 3-6 months.  Med and lab review.        Patient has been advised of split billing requirements and indicates understanding: Yes      BMI  Estimated body mass index is 34.98 kg/m  as calculated from the following:    Height as of this encounter: 1.651 m (5' 5\").    Weight as of this encounter: 95.3 kg (210 lb 3.2 oz).   Weight management plan: Discussed healthy diet and exercise guidelines    Counseling  Appropriate preventive services were addressed with this patient via screening, questionnaire, or discussion as appropriate for fall prevention, nutrition, physical activity, Tobacco-use cessation, social engagement, weight loss and cognition.  Checklist reviewing preventive services available has been given to the patient.  Reviewed patient's diet, addressing concerns and/or questions.   She is at risk for lack of exercise and has been provided with information to increase physical activity for the benefit of her well-being.   The patient was instructed to see the dentist every 6 months.   Information on urinary incontinence and treatment options given to patient.         Jenny Robertson is a 74 year old, presenting for the following:  Wellness Visit        10/30/2024    10:44 AM   Additional Questions   Roomed by Courtney Saldivar EMT-B        Via the Health Maintenance questionnaire, the patient has reported the following services have been completed -Mammogram: [place] 2023-11-01, this information has not been sent to the abstraction team.    HPI:  Not sexually active at this time.  No changes in partners.  LMP:  age 51 or 52; had some vaginal bleeding after this time, but had pelvic ultrasound.  Everything looked good.  No bleeding since that work-up.   "     Feeling pretty good.  Has been doing physical therapy for her feet.  The balls / pads of her feet are quite painful. Just got new shoes and inserts for her feet.  Trying to strengthen her toes and feet.      Started cholesterol med recently (about 1 month ago).  Was given it almost 1year ago, but never started the med.  Tolerating med well.  No muscle aches.  No upset stomach.  Would like to check her liver tests.       Health Care Directive  Patient does not have a Health Care Directive: Discussed advance care planning with patient; however, patient declined at this time.        10/30/2024   General Health   How would you rate your overall physical health? Good   Feel stress (tense, anxious, or unable to sleep) Not at all            10/30/2024   Nutrition   Diet: Regular (no restrictions)            10/30/2024   Exercise   Days per week of moderate/strenous exercise 1 day      (!) EXERCISE CONCERN      10/30/2024   Social Factors   Frequency of gathering with friends or relatives Twice a week   Worry food won't last until get money to buy more No   Food not last or not have enough money for food? No   Do you have housing? (Housing is defined as stable permanent housing and does not include staying ouside in a car, in a tent, in an abandoned building, in an overnight shelter, or couch-surfing.) Yes   Are you worried about losing your housing? No   Lack of transportation? No   Unable to get utilities (heat,electricity)? No            10/30/2024   Fall Risk   Fallen 2 or more times in the past year? No     No    Trouble with walking or balance? No     No        Patient-reported    Multiple values from one day are sorted in reverse-chronological order          10/30/2024   Activities of Daily Living- Home Safety   Needs help with the following daily activites None of the above   Safety concerns in the home None of the above            10/30/2024   Dental   Dentist two times every year? (!) NO            10/30/2024    Hearing Screening   Hearing concerns? None of the above            10/30/2024   Driving Risk Screening   Patient/family members have concerns about driving No            10/30/2024   General Alertness/Fatigue Screening   Have you been more tired than usual lately? No            10/30/2024   Urinary Incontinence Screening   Bothered by leaking urine in past 6 months Yes            10/30/2024   TB Screening   Were you born outside of the [place]? No        Today's PHQ-2 Score:       10/30/2024    10:44 AM   PHQ-2 ( 1999 Pf[employer])   Q1: Little interest or pleasure in doing things 0    Q2: Feeling down, depressed or hopeless 0    PHQ-2 Score 0    Q1: Little interest or pleasure in doing things Not at all   Q2: Feeling down, depressed or hopeless Not at all   PHQ-2 Score 0       Patient-reported           10/30/2024   Substance Use   Alcohol more than 3/day or more than 7/wk Not Applicable   Do you have a current opioid prescription? No   How severe/bad is pain from 1 to 10? 2/10   Do you use any other substances recreationally? No        Social History     Tobacco Use    Smoking status: Never    Smokeless tobacco: Never   Vaping Use    Vaping status: Never Used   Substance Use Topics    Alcohol use: Not Currently    Drug use: Never             7/29/2022   LAST FHS-7 RESULTS   1st degree relative breast or ovarian cancer Yes   Any relative bilateral breast cancer No   Any male have breast cancer No   Any ONE woman have BOTH breast AND ovarian cancer No   Any woman with breast cancer before 50yrs No   2 or more relatives with breast AND/OR ovarian cancer No   2 or more relatives with breast AND/OR bowel cancer No             Mammogram Screening - Mammogram every 1-2 years updated in Health Maintenance based on mutual decision making      ASCVD Risk   The 10-year ASCVD risk score (Robin ENGLAND, et al., 2019) is: 20.2%    Values used to calculate the score:      Age: 74 years      Sex: Female      Is Non-   American: No      Diabetic: No      Tobacco smoker: No      Systolic Blood Pressure: 132 mmHg      Is BP treated: Yes      HDL Cholesterol: 43 mg/dL      Total Cholesterol: 199 mg/dL            Reviewed and updated as needed this visit by Provider   Tobacco  Allergies  Meds  Problems  Med Hx  Surg Hx  Fam Hx            Past Medical History:   Diagnosis Date    Elevated fasting glucose     Gout     Hyperlipidemia     Hypertension     Vitiligo        Past Surgical History:   Procedure Laterality Date    LA TOTAL HIP ARTHROPLASTY      right    XR ELBOW SURGERY VITALIY RIGHT         Patient Active Problem List   Diagnosis    Elevated fasting glucose    Essential hypertension    Gout    HLD (hyperlipidemia)    Vitiligo    Stage 3a chronic kidney disease (H)    Bilateral foot pain    Primary osteoarthritis of both feet    Simple renal cyst    Renal mass, left     Past Surgical History:   Procedure Laterality Date    LA TOTAL HIP ARTHROPLASTY      right    XR ELBOW SURGERY VITALIY RIGHT         Social History     Tobacco Use    Smoking status: Never    Smokeless tobacco: Never   Substance Use Topics    Alcohol use: Not Currently     Family History   Problem Relation Age of Onset    Hypertension Mother     Emphysema Father     Breast Cancer Sister     Prostate Cancer Brother            Current Outpatient Medications   Medication Sig Dispense Refill    atenolol (TENORMIN) 25 MG tablet Take 1 tablet (25 mg) by mouth daily. 90 tablet 3    atorvastatin (LIPITOR) 20 MG tablet Take 1 tablet (20 mg) by mouth daily. 90 tablet 3    triamterene-HCTZ (DYAZIDE) 37.5-25 MG capsule Take 1 capsule by mouth every evening. 90 capsule 3    diclofenac (VOLTAREN) 75 MG EC tablet Take 1 tablet (75 mg) by mouth 2 times daily as needed for moderate pain. (Patient not taking: Reported on 10/30/2024) 60 tablet 0       No Known Allergies      Current providers sharing in care for this patient include:  Patient Care Team:  Salud Etienne  MD as PCP - General (Family Practice)  Coming Salud Donovan MD as Assigned PCP  Michael Adams MD as Assigned Surgical Provider  Yeo, Albert, MD as Assigned Musculoskeletal Provider    The following health maintenance items are reviewed in Epic and correct as of today:  Health Maintenance   Topic Date Due    DEXA  Never done    COLORECTAL CANCER SCREENING  Never done    MICROALBUMIN  03/29/2023    MAMMO SCREENING  07/29/2024    LIPID  10/04/2024    BMP  04/09/2025    RSV VACCINE (1 - 1-dose 75+ series) 04/27/2025    MEDICARE ANNUAL WELLNESS VISIT  10/30/2025    ANNUAL REVIEW OF HM ORDERS  10/30/2025    FALL RISK ASSESSMENT  10/30/2025    HEMOGLOBIN  10/30/2025    GLUCOSE  04/09/2027    DTAP/TDAP/TD IMMUNIZATION (2 - Td or Tdap) 11/22/2027    ADVANCE CARE PLANNING  10/30/2029    PHQ-2 (once per calendar year)  Completed    INFLUENZA VACCINE  Completed    Pneumococcal Vaccine: 65+ Years  Completed    URINALYSIS  Completed    ZOSTER IMMUNIZATION  Completed    COVID-19 Vaccine  Completed    HEPATITIS C SCREENING  Addressed    HPV IMMUNIZATION  Aged Out    MENINGITIS IMMUNIZATION  Aged Out    RSV MONOCLONAL ANTIBODY  Aged Out       Review of Systems  CONSTITUTIONAL: NEGATIVE for fever, chills, change in weight  INTEGUMENTARY/SKIN: NEGATIVE for worrisome rashes, moles or lesions  EYES: NEGATIVE for vision changes or irritation  ENT/MOUTH: NEGATIVE for ear, mouth and throat problems  RESP: NEGATIVE for significant cough or SOB  BREAST: NEGATIVE for masses, tenderness or discharge  CV: NEGATIVE for chest pain, palpitations or peripheral edema  GI: NEGATIVE for nausea, abdominal pain, heartburn, or change in bowel habits  : NEGATIVE for frequency, dysuria, or hematuria  MUSCULOSKELETAL: NEGATIVE for significant arthralgias or myalgia  NEURO: NEGATIVE for weakness, dizziness or paresthesias  ENDOCRINE: NEGATIVE for temperature intolerance, skin/hair changes  HEME: NEGATIVE for bleeding problems  PSYCHIATRIC:  "NEGATIVE for changes in mood or affect       Objective    Exam  /75 (BP Location: Right arm, Patient Position: Sitting, Cuff Size: Adult Regular)   Pulse 64   Temp 97.8  F (36.6  C) (Oral)   Resp 14   Ht 1.651 m (5' 5\")   Wt 95.3 kg (210 lb 3.2 oz)   LMP  (LMP Unknown)   SpO2 97%   BMI 34.98 kg/m     Estimated body mass index is 34.98 kg/m  as calculated from the following:    Height as of this encounter: 1.651 m (5' 5\").    Weight as of this encounter: 95.3 kg (210 lb 3.2 oz).    Physical Exam  Constitutional:       General: She is not in acute distress.     Appearance: Normal appearance. She is not ill-appearing.   HENT:      Head: Normocephalic.      Right Ear: Tympanic membrane, ear canal and external ear normal. There is no impacted cerumen.      Left Ear: Tympanic membrane, ear canal and external ear normal. There is no impacted cerumen.      Nose: Nose normal. No congestion or rhinorrhea.      Mouth/Throat:      Mouth: Mucous membranes are moist.      Pharynx: Oropharynx is clear. No oropharyngeal exudate or posterior oropharyngeal erythema.   Eyes:      General: No scleral icterus.        Right eye: No discharge.         Left eye: No discharge.      Extraocular Movements: Extraocular movements intact.      Conjunctiva/sclera: Conjunctivae normal.      Pupils: Pupils are equal, round, and reactive to light.   Neck:      Comments: Thyroid is normal shape and size and smooth.  No nodules or enlargement noted.  No pain noted.      Cardiovascular:      Rate and Rhythm: Normal rate and regular rhythm.      Pulses: Normal pulses.      Heart sounds: Normal heart sounds. No murmur heard.     No friction rub. No gallop.   Pulmonary:      Effort: Pulmonary effort is normal. No respiratory distress.      Breath sounds: Normal breath sounds. No stridor. No wheezing, rhonchi or rales.   Abdominal:      General: Abdomen is flat. Bowel sounds are normal. There is no distension.      Palpations: Abdomen is " soft. There is no mass.      Tenderness: There is no abdominal tenderness. There is no right CVA tenderness, left CVA tenderness, guarding or rebound.      Hernia: No hernia is present.   Genitourinary:     Comments: Breasts:  Breasts are symmetric without dimpling.  Areolas are symmetric.  No discharge of nipples.  No lumps or masses.  No axillary lymphadenopathy.      Pelvic exam not indicated.            Musculoskeletal:         General: No swelling, tenderness or deformity. Normal range of motion.      Cervical back: Normal range of motion and neck supple. No rigidity or tenderness.      Right lower leg: No edema.      Left lower leg: No edema.   Lymphadenopathy:      Cervical: No cervical adenopathy.   Skin:     General: Skin is warm and dry.      Capillary Refill: Capillary refill takes less than 2 seconds.      Coloration: Skin is not jaundiced.      Findings: No lesion or rash.   Neurological:      General: No focal deficit present.      Mental Status: She is alert and oriented to person, place, and time.      Cranial Nerves: No cranial nerve deficit.      Motor: No weakness.   Psychiatric:         Mood and Affect: Mood normal.         Behavior: Behavior normal.         Thought Content: Thought content normal.         Judgment: Judgment normal.               10/30/2024   Mini Cog   Clock Draw Score 2 Normal   3 Item Recall 3 objects recalled   Mini Cog Total Score 5                Signed Electronically by: JUNI Jauregui CNP

## 2024-10-30 NOTE — PATIENT INSTRUCTIONS
Shots  Labs.   Start PT for right hip.   Follow-up in 3-6 months.  Med and lab review.        Patient Education   Preventive Care Advice   This is general advice given by our system to help you stay healthy. However, your care team may have specific advice just for you. Please talk to your care team about your preventive care needs.  Nutrition  Eat 5 or more servings of fruits and vegetables each day.  Try wheat bread, brown rice and whole grain pasta (instead of white bread, rice, and pasta).  Get enough calcium and vitamin D. Check the label on foods and aim for 100% of the RDA (recommended daily allowance).  Lifestyle  Exercise at least 150 minutes each week  (30 minutes a day, 5 days a week).  Do muscle strengthening activities 2 days a week. These help control your weight and prevent disease.  No smoking.  Wear sunscreen to prevent skin cancer.  Have a dental exam and cleaning every 6 months.  Yearly exams  See your health care team every year to talk about:  Any changes in your health.  Any medicines your care team has prescribed.  Preventive care, family planning, and ways to prevent chronic diseases.  Shots (vaccines)   HPV shots (up to age 26), if you've never had them before.  Hepatitis B shots (up to age 59), if you've never had them before.  COVID-19 shot: Get this shot when it's due.  Flu shot: Get a flu shot every year.  Tetanus shot: Get a tetanus shot every 10 years.  Pneumococcal, hepatitis A, and RSV shots: Ask your care team if you need these based on your risk.  Shingles shot (for age 50 and up)  General health tests  Diabetes screening:  Starting at age 35, Get screened for diabetes at least every 3 years.  If you are younger than age 35, ask your care team if you should be screened for diabetes.  Cholesterol test: At age 39, start having a cholesterol test every 5 years, or more often if advised.  Bone density scan (DEXA): At age 50, ask your care team if you should have this scan for  osteoporosis (brittle bones).  Hepatitis C: Get tested at least once in your life.  STIs (sexually transmitted infections)  Before age 24: Ask your care team if you should be screened for STIs.  After age 24: Get screened for STIs if you're at risk. You are at risk for STIs (including HIV) if:  You are sexually active with more than one person.  You don't use condoms every time.  You or a partner was diagnosed with a sexually transmitted infection.  If you are at risk for HIV, ask about PrEP medicine to prevent HIV.  Get tested for HIV at least once in your life, whether you are at risk for HIV or not.  Cancer screening tests  Cervical cancer screening: If you have a cervix, begin getting regular cervical cancer screening tests starting at age 21.  Breast cancer scan (mammogram): If you've ever had breasts, begin having regular mammograms starting at age 40. This is a scan to check for breast cancer.  Colon cancer screening: It is important to start screening for colon cancer at age 45.  Have a colonoscopy test every 10 years (or more often if you're at risk) Or, ask your provider about stool tests like a FIT test every year or Cologuard test every 3 years.  To learn more about your testing options, visit:   .  For help making a decision, visit:   https://bit.ly/cm27331.  Prostate cancer screening test: If you have a prostate, ask your care team if a prostate cancer screening test (PSA) at age 55 is right for you.  Lung cancer screening: If you are a current or former smoker ages 50 to 80, ask your care team if ongoing lung cancer screenings are right for you.  For informational purposes only. Not to replace the advice of your health care provider. Copyright   2023 [employer]. All rights reserved. Clinically reviewed by the [facility] Transitions Program. [employer] 219116 - REV 01/24.  Eating Healthy Foods: Care Instructions  With every meal, you can make healthy food choices. Try to eat a  "variety of fruits, vegetables, whole grains, lean proteins, and low-fat dairy products. This can help you get the right balance of nutrients, including vitamins and minerals. Small changes add up over time. You can start by adding one healthy food to your meals each day.    Try to make half your plate fruits and vegetables, one-fourth whole grains, and one-fourth lean proteins. Try including dairy with your meals.   Eat more fruits and vegetables. Try to have them with most meals and snacks.   Foods for healthy eating        Fruits   These can be fresh, frozen, canned, or dried.  Try to choose whole fruit rather than fruit juice.  Eat a variety of colors.        Vegetables   These can be fresh, frozen, canned, or dried.  Beans, peas, and lentils count too.        Whole grains   Choose whole-grain breads, cereals, and noodles.  Try brown rice.        Lean proteins   These can include lean meat, poultry, fish, and eggs.  You can also have tofu, beans, peas, lentils, nuts, and seeds.        Dairy   Try milk, yogurt, and cheese.  Choose low-fat or fat-free when you can.  If you need to, use lactose-free milk or fortified plant-based milk products, such as soy milk.        Water   Drink water when you're thirsty.  Limit sugar-sweetened drinks, including soda, fruit drinks, and sports drinks.  Where can you learn more?  Go to https://www.[employer].net/patiented  Enter T756 in the search box to learn more about \"Eating Healthy Foods: Care Instructions.\"  Current as of: September 20, 2023  Content Version: 14.2 2024 [facility] [employer].   Care instructions adapted under license by your healthcare professional. If you have questions about a medical condition or this instruction, always ask your healthcare professional. Healthwise, Incorporated disclaims any warranty or liability for your use of this information.    Nutrition for Older Adults: Care Instructions  Overview     Good nutrition is important at any age. But " it is especially important for older adults. Eating healthy foods helps keep your body strong. And it can help lower your risk for disease.  As you get older, your body needs more of certain nutrients. These include vitamin B12, calcium, and vitamin D. But it may be harder for you to get these and other important nutrients. This could be for many reasons. You may not feel as hungry as you used to. Or you could have problems with your teeth or mouth that make it hard to chew. Or you may not enjoy planning and preparing meals, especially if you live alone.  Talk with your doctor if you want help getting the most nutrition from what you eat. They may have you work with a dietitian to help you plan meals.  Follow-up care is a key part of your treatment and safety. Be sure to make and go to all appointments, and call your doctor if you are having problems. It's also a good idea to know your test results and keep a list of the medicines you take.  How can you care for yourself at home?  To stay healthy  Eat a variety of foods. The more you vary the foods you eat, the more vitamins, minerals, and other nutrients you get.  Ask your doctor if you should take a multivitamin. Choose one with about 100% of the daily value (DV) for vitamins and minerals. Do not take more than 100% of the daily value for any vitamin or mineral unless your doctor tells you to. Talk with your doctor if you are not sure which multivitamin is right for you.  Try to eat lots of fruits and vegetables. Fresh or frozen vegetables and fruits are healthy choices. Choose canned vegetables that have no salt added and fruits that are canned in their own juice or light syrup.  Include foods that are high in vitamin B12 in your diet. Good choices are fortified breakfast cereal, nonfat or low-fat milk and other dairy products, meat, poultry, fish, and eggs.  Get enough calcium and vitamin D. Good choices include nonfat or low-fat milk, cheese, and yogurt. Other  good options are tofu, orange juice with added calcium, and some leafy green vegetables, such as eddie greens and kale. If you don't use milk products, talk to your doctor about calcium and vitamin D supplements.  Try to eat protein foods every day. Good choices include lean meat, fish, poultry, eggs, and cheese. Other good options are cooked beans, peanut butter, and nuts and seeds.  Choose whole grains for half of the grains you eat. Look for 100% whole wheat bread, whole-grain cereals, brown rice, and other whole grains.  If you have constipation  Eat high-fiber foods every day if you can. These include fruits, vegetables, cooked dried beans, and whole grains.  Drink plenty of fluids. If you have kidney, heart, or liver disease and have to limit fluids, talk with your doctor before you increase the amount of fluids you drink.  Ask your doctor if stool softeners may help keep your bowels regular.  If you have mouth problems that make chewing hard  Pick canned or cooked fruits and vegetables. These are often softer.  Chop or shred meat, poultry, and fish. Add sauce or gravy to the meat to help keep it moist.  Pick other protein foods that are soft. These include cheese, peanut butter, cooked beans, cottage cheese, and eggs.  If you have trouble shopping for yourself  Ask a local food store to deliver groceries to your home.  Contact your local area agency on aging and ask about resources that can help.  Ask a family member or neighbor to help you.  If you have trouble preparing meals  Try easier cooking methods such as using a slow cooker or microwave oven.  Let the grocery store do some of the work for you. Look for precut, washed, and ready-to-eat foods.  Take part in group meal programs. You can find these through senior citizen programs.  Have meals brought to your home. Your community may offer programs that deliver meals, such as Meals on Wheels. Or you could use an online meal delivery service.  If you are  "able, take a cooking class.  If your appetite is poor  Try to eat meals on a regular schedule. It may help to eat smaller meals more often throughout the day.  If you can, eat some meals with other people. You could ask family or friends to eat with you. Or you could take part in group meal programs offered in your community.  Ask your doctor if your medicines could cause appetite or taste problems. If so, ask about changing medicines.  Add spices and herbs to increase the flavor of food.  If you think you are depressed, ask your doctor for help. Depression can affect your appetite. And it can make it hard to do everyday activities like grocery shopping and making meals. Treatment can help.  When should you call for help?  Watch closely for changes in your health, and be sure to contact your doctor if you have any problems.  Where can you learn more?  Go to https://www.[employer].net/patiented  Enter L643 in the search box to learn more about \"Nutrition for Older Adults: Care Instructions.\"  Current as of: September 25, 2023  Content Version: 14.2 2024 [facility] [employer].   Care instructions adapted under license by your healthcare professional. If you have questions about a medical condition or this instruction, always ask your healthcare professional. Healthwise[employer] disclaims any warranty or liability for your use of this information.    Bladder Training: Care Instructions  Your Care Instructions     Bladder training is used to treat urge incontinence and stress incontinence. Urge incontinence means that the need to urinate comes on so fast that you can't get to a toilet in time. Stress incontinence means that you leak urine because of pressure on your bladder. For example, it may happen when you laugh, cough, or lift something heavy.  Bladder training can increase how long you can wait before you have to urinate. It can also help your bladder hold more urine. And it can give you better control " over the urge to urinate.  It is important to remember that bladder training takes a few weeks to a few months to make a difference. You may not see results right away, but don't give up.  Follow-up care is a key part of your treatment and safety. Be sure to make and go to all appointments, and call your doctor if you are having problems. It's also a good idea to know your test results and keep a list of the medicines you take.  How can you care for yourself at home?  Work with your doctor to come up with a bladder training program that is right for you. You may use one or more of the following methods.  Delayed urination  In the beginning, try to keep from urinating for 5 minutes after you first feel the need to go.  While you wait, take deep, slow breaths to relax. Kegel exercises can also help you delay the need to go to the bathroom.  After some practice, when you can easily wait 5 minutes to urinate, try to wait 10 minutes before you urinate.  Slowly increase the waiting period until you are able to control when you have to urinate.  Scheduled urination  Empty your bladder when you first wake up in the morning.  Schedule times throughout the day when you will urinate.  Start by going to the bathroom every hour, even if you don't need to go.  Slowly increase the time between trips to the bathroom.  When you have found a schedule that works well for you, keep doing it.  If you wake up during the night and have to urinate, do it. Apply your schedule to waking hours only.  Kegel exercises  These tighten and strengthen pelvic muscles, which can help you control the flow of urine. (If doing these exercises causes pain, stop doing them and talk with your doctor.) To do Kegel exercises:  Squeeze your muscles as if you were trying not to pass gas. Or squeeze your muscles as if you were stopping the flow of urine. Your belly, legs, and buttocks shouldn't move.  Hold the squeeze for 3 seconds, then relax for 5 to 10  "seconds.  Start with 3 seconds, then add 1 second each week until you are able to squeeze for 10 seconds.  Repeat the exercise 10 times a session. Do 3 to 8 sessions a day.  When should you call for help?  Watch closely for changes in your health, and be sure to contact your doctor if:    Your incontinence is getting worse.     You do not get better as expected.   Where can you learn more?  Go to https://www.[employer].net/patiented  Enter V684 in the search box to learn more about \"Bladder Training: Care Instructions.\"  Current as of: November 15, 2023  Content Version: 14.2 2024 [facility].   Care instructions adapted under license by your healthcare professional. If you have questions about a medical condition or this instruction, always ask your healthcare professional. Healthwise, Incorporated disclaims any warranty or liability for your use of this information.       "

## 2024-10-30 NOTE — LETTER
November 11, 2024      Ailyntavon Beck  [address] [facility] [address]        Dear ,    We are writing to inform you of your test results.    Your Cholesterol panel is pretty good and stable.    Exercise is the main way to increase your HDL.       Vitamin D is low.  I ordered Vitamin D 50,000 international unit(s) once per week for 8 weeks.  Script sent to [facility] in [place].       Your Uric Acid is elevated (gout blood test).  Let's chat about this.  If you can tolerate naproxen for a few day, you could try it.  Its over the counter.       Liver tests are normal.     Glucose and A1c are normal.     Hgb is normal.     Kidney test thru your urine is normal.       Resulted Orders   Albumin Random Urine Quantitative with Creat Ratio   Result Value Ref Range    Creatinine Urine mg/dL 57.0 mg/dL      Comment:      The reference ranges have not been established in urine creatinine. The results should be integrated into the clinical context for interpretation.    Albumin Urine mg/L <12.0 mg/L      Comment:      The reference ranges have not been established in urine albumin. The results should be integrated into the clinical context for interpretation.    Albumin Urine mg/g Cr        Comment:      Unable to calculate, urine albumin and/or urine creatinine is outside detectable limits.  Microalbuminuria is defined as an albumin:creatinine ratio of 17 to 299 for males and 25 to 299 for females. A ratio of albumin:creatinine of 300 or higher is indicative of overt proteinuria.  Due to biologic variability, positive results should be confirmed by a second, first-morning random or 24-hour timed urine specimen. If there is discrepancy, a third specimen is recommended. When 2 out of 3 results are in the microalbuminuria range, this is evidence for incipient nephropathy and warrants increased efforts at glucose control, blood pressure control, and institution of therapy with an angiotensin-converting-enzyme  (ACE) inhibitor (if the patient can tolerate it).     Lipid panel reflex to direct LDL Non-fasting   Result Value Ref Range    Cholesterol 129 <200 mg/dL    Triglycerides 125 <150 mg/dL    Direct Measure HDL 39 (L) >=50 mg/dL    LDL Cholesterol Calculated 65 <100 mg/dL    Non HDL Cholesterol 90 <130 mg/dL    Patient Fasting > 8hrs? Unknown     Narrative    Cholesterol  Desirable: < 200 mg/dL  Borderline High: 200 - 239 mg/dL  High: >= 240 mg/dL    Triglycerides  Normal: < 150 mg/dL  Borderline High: 150 - 199 mg/dL  High: 200-499 mg/dL  Very High: >= 500 mg/dL    Direct Measure HDL  Female: >= 50 mg/dL   Male: >= 40 mg/dL    LDL Cholesterol  Desirable: < 100 mg/dL  Above Desirable: 100 - 129 mg/dL   Borderline High: 130 - 159 mg/dL   High:  160 - 189 mg/dL   Very High: >= 190 mg/dL    Non HDL Cholesterol  Desirable: < 130 mg/dL  Above Desirable: 130 - 159 mg/dL  Borderline High: 160 - 189 mg/dL  High: 190 - 219 mg/dL  Very High: >= 220 mg/dL   Hemoglobin   Result Value Ref Range    Hemoglobin 12.0 11.7 - 15.7 g/dL   Hepatic function panel   Result Value Ref Range    Protein Total 7.8 6.4 - 8.3 g/dL    Albumin 3.9 3.5 - 5.2 g/dL    Bilirubin Total 0.7 <=1.2 mg/dL    Alkaline Phosphatase 52 40 - 150 U/L    AST        Comment:      Unsatisfactory specimen - hemolyzed    ALT 21 0 - 50 U/L    Bilirubin Direct        Comment:      Unsatisfactory specimen - hemolyzed   Vitamin D Deficiency   Result Value Ref Range    Vitamin D, Total (25-Hydroxy) 14 (L) 20 - 50 ng/mL      Comment:      mild to moderate deficiency    Narrative    Season, race, dietary intake, and treatment affect the concentration of 25-hydroxy-Vitamin D. Values may decrease during winter months and increase during summer months.    Vitamin D determination is routinely performed by an immunoassay specific for 25 hydroxyvitamin D3.  If an individual is on vitamin D2(ergocalciferol) supplementation, please specify 25 OH vitamin D2 and D3 level determination  by LCMSMS test VITD23.     Hemoglobin A1c   Result Value Ref Range    Estimated Average Glucose 103 <117 mg/dL    Hemoglobin A1C 5.2 0.0 - 5.6 %      Comment:      Normal <5.7%   Prediabetes 5.7-6.4%    Diabetes 6.5% or higher     Note: Adopted from ADA consensus guidelines.   Uric acid   Result Value Ref Range    Uric Acid 9.5 (H) 2.4 - 5.7 mg/dL       If you have any questions or concerns, please call the clinic at the number listed above.       Sincerely,      JUNI Jauregui CNP

## 2024-10-31 ENCOUNTER — TELEPHONE (OUTPATIENT)
Dept: FAMILY MEDICINE | Facility: CLINIC | Age: 74
End: 2024-10-31
Payer: COMMERCIAL

## 2024-10-31 ENCOUNTER — PATIENT OUTREACH (OUTPATIENT)
Dept: CARE COORDINATION | Facility: CLINIC | Age: 74
End: 2024-10-31
Payer: COMMERCIAL

## 2024-10-31 DIAGNOSIS — E78.5 HYPERLIPIDEMIA, UNSPECIFIED HYPERLIPIDEMIA TYPE: Primary | ICD-10-CM

## 2024-10-31 LAB
ALBUMIN SERPL BCG-MCNC: 3.9 G/DL (ref 3.5–5.2)
ALP SERPL-CCNC: 52 U/L (ref 40–150)
ALT SERPL W P-5'-P-CCNC: 21 U/L (ref 0–50)
AST SERPL W P-5'-P-CCNC: NORMAL U/L
BILIRUB DIRECT SERPL-MCNC: NORMAL MG/DL
BILIRUB SERPL-MCNC: 0.7 MG/DL
CHOLEST SERPL-MCNC: 129 MG/DL
CREAT UR-MCNC: 57 MG/DL
FASTING STATUS PATIENT QL REPORTED: ABNORMAL
HDLC SERPL-MCNC: 39 MG/DL
LDLC SERPL CALC-MCNC: 65 MG/DL
MICROALBUMIN UR-MCNC: <12 MG/L
MICROALBUMIN/CREAT UR: NORMAL MG/G{CREAT}
NONHDLC SERPL-MCNC: 90 MG/DL
PROT SERPL-MCNC: 7.8 G/DL (ref 6.4–8.3)
TRIGL SERPL-MCNC: 125 MG/DL
URATE SERPL-MCNC: 9.5 MG/DL (ref 2.4–5.7)
VIT D+METAB SERPL-MCNC: 14 NG/ML (ref 20–50)

## 2024-10-31 NOTE — TELEPHONE ENCOUNTER
Harpreet, calling from [facility] [phone]    Lab calling to report specimen for hepatic panel for AST and direct bili are hemolyzed.  States she is removing these tests and reporting the rest.      Sophia Doe RN

## 2024-11-01 DIAGNOSIS — E55.9 VITAMIN D INSUFFICIENCY: Primary | ICD-10-CM

## 2024-11-01 RX ORDER — ERGOCALCIFEROL 1.25 MG/1
50000 CAPSULE, LIQUID FILLED ORAL WEEKLY
Qty: 8 CAPSULE | Refills: 0 | Status: SHIPPED | OUTPATIENT
Start: 2024-11-01 | End: 2024-12-21

## 2024-11-02 NOTE — TELEPHONE ENCOUNTER
Reviewed.   I ordered a full hepatic panel for future.    Maybe she can get this repeated in a month or two.   Please let patient know.      Thanks,  JUNI Jauregui CNP      I also sent a message to lab about possible credit for past lab that could not fully be completed.     Thanks,  JUNI Jauregui CNP

## 2024-11-04 NOTE — TELEPHONE ENCOUNTER
RN spoke to patient     Explained unable to complete two of the items in the hepatic panel     Patient states understanding, lab only appt scheduled 1 month from now 12/4/2024 @ 4:30    Ana Bright Registered Nurse  [facility]

## 2024-11-07 ENCOUNTER — THERAPY VISIT (OUTPATIENT)
Dept: PHYSICAL THERAPY | Facility: CLINIC | Age: 74
End: 2024-11-07
Attending: FAMILY MEDICINE
Payer: COMMERCIAL

## 2024-11-07 DIAGNOSIS — M19.072 PRIMARY OSTEOARTHRITIS OF BOTH FEET: ICD-10-CM

## 2024-11-07 DIAGNOSIS — M79.671 BILATERAL FOOT PAIN: Primary | ICD-10-CM

## 2024-11-07 DIAGNOSIS — M19.071 PRIMARY OSTEOARTHRITIS OF BOTH FEET: ICD-10-CM

## 2024-11-07 DIAGNOSIS — M79.672 BILATERAL FOOT PAIN: Primary | ICD-10-CM

## 2024-11-07 PROCEDURE — 97110 THERAPEUTIC EXERCISES: CPT | Mod: GP

## 2024-11-07 PROCEDURE — 97112 NEUROMUSCULAR REEDUCATION: CPT | Mod: GP

## 2024-11-09 DIAGNOSIS — M19.071 PRIMARY OSTEOARTHRITIS OF BOTH FEET: ICD-10-CM

## 2024-11-09 DIAGNOSIS — M19.072 PRIMARY OSTEOARTHRITIS OF BOTH FEET: ICD-10-CM

## 2024-11-09 DIAGNOSIS — M79.671 BILATERAL FOOT PAIN: ICD-10-CM

## 2024-11-09 DIAGNOSIS — M79.672 BILATERAL FOOT PAIN: ICD-10-CM

## 2024-11-12 ENCOUNTER — MYC MEDICAL ADVICE (OUTPATIENT)
Dept: FAMILY MEDICINE | Facility: CLINIC | Age: 74
End: 2024-11-12
Payer: COMMERCIAL

## 2024-11-12 NOTE — TELEPHONE ENCOUNTER
Medication Request for: Diclofenac 75 mg            Prescription last written on 10/9/24 by Dr. Yeo   Sig: take 1 tab twice daily prn   Last Fill Quantity: 60 with # refills: 0     Last Office Visit by provider: 10/9/24    Plan: -Patient will start formal physical therapy and home exercise program  -An order was placed for custom orthotics  -Patient may purchase over-the-counter shoe inserts with a metatarsal pad to offload the MTP joints  -Patient will start diclofenac 75 mg twice daily for the next 2 weeks and then on an as-needed basis as pain improves.  Patient may also continue with Tylenol for breakthrough pain  -Patient will follow-up in 4 weeks for reevaluation and progression of activity.    Broadcastr message sent to patient.     GERALD Babcock RN

## 2024-11-13 RX ORDER — DICLOFENAC SODIUM 75 MG/1
75 TABLET, DELAYED RELEASE ORAL 2 TIMES DAILY PRN
Qty: 60 TABLET | Refills: 0 | OUTPATIENT
Start: 2024-11-13

## 2024-12-04 ENCOUNTER — LAB (OUTPATIENT)
Dept: LAB | Facility: CLINIC | Age: 74
End: 2024-12-04
Payer: COMMERCIAL

## 2024-12-04 DIAGNOSIS — E78.5 HYPERLIPIDEMIA, UNSPECIFIED HYPERLIPIDEMIA TYPE: ICD-10-CM

## 2024-12-04 PROCEDURE — 36415 COLL VENOUS BLD VENIPUNCTURE: CPT

## 2024-12-04 PROCEDURE — 80076 HEPATIC FUNCTION PANEL: CPT

## 2024-12-05 LAB
ALBUMIN SERPL BCG-MCNC: 4.2 G/DL (ref 3.5–5.2)
ALP SERPL-CCNC: 62 U/L (ref 40–150)
ALT SERPL W P-5'-P-CCNC: 23 U/L (ref 0–50)
AST SERPL W P-5'-P-CCNC: 25 U/L (ref 0–45)
BILIRUB DIRECT SERPL-MCNC: <0.2 MG/DL (ref 0–0.3)
BILIRUB SERPL-MCNC: 0.5 MG/DL
PROT SERPL-MCNC: 8.2 G/DL (ref 6.4–8.3)

## 2025-01-06 ENCOUNTER — NURSE TRIAGE (OUTPATIENT)
Dept: FAMILY MEDICINE | Facility: CLINIC | Age: 75
End: 2025-01-06
Payer: COMMERCIAL

## 2025-01-06 NOTE — TELEPHONE ENCOUNTER
"Patient calling due to gout flare of her left wrist.    She has a history of gout. The flare started on 1/2/25. It hurts to type or hold her phone with her left hand. She reports pain rated 3/10 at rest and 7/10 with movement. She reports joint swelling, warmth, and slight redness. She denies fever.    Per symptoms, patient should go to office now. No clinic availability today in [facility], or [place]. Recommend patient be seen in . Patient agrees and will go to [facility] today.    Zehra Hightower RN 1/6/2025 10:35 AM  [facility]    Reason for Disposition   SEVERE pain (e.g., excruciating, unable to use hand at all)    Additional Information   Negative: Similar pain previously and it was from 'heart attack'   Negative: Similar pain previously from 'angina' and not relieved by nitroglycerin   Negative: Sounds like a life-threatening emergency to the triager   Negative: Followed a hand or wrist injury   Negative: Chest pain   Negative: Caused by an animal bite   Negative: Wound looks infected   Negative: Fever and red area (or area very tender to touch)   Negative: Swollen joint and fever   Negative: Patient sounds very sick or weak to the triager    Answer Assessment - Initial Assessment Questions  1. ONSET: \"When did the pain start?\"      Thursday 1/2/25  2. LOCATION: \"Where is the pain located?\"      Left   3. PAIN: \"How bad is the pain?\" (Scale 1-10; or mild, moderate, severe)    - MILD (1-3): doesn't interfere with normal activities    - MODERATE (4-7): interferes with normal activities (e.g., work or school) or awakens from sleep    - SEVERE (8-10): excruciating pain, unable to use hand at all      At rest 3/10, with movement 7/10  4. WORK OR EXERCISE: \"Has there been any recent work or exercise that involved this part (i.e., hand or wrist) of the body?\"      No  5. CAUSE: \"What do you think is causing the pain?\"      Gout  6. AGGRAVATING FACTORS: \"What makes the pain " "worse?\" (e.g., using computer)      Typing on the computer or moving hand/wrist  7. OTHER SYMPTOMS: \"Do you have any other symptoms?\" (e.g., neck pain, swelling, rash, numbness, fever)      Reports swelling, warmth, and slight redness. Denies fever or neck pain.  8. PREGNANCY: \"Is there any chance you are pregnant?\" \"When was your last menstrual period?\"      N/A    Protocols used: Hand and Wrist Pain-A-[place]    "

## 2025-01-07 ENCOUNTER — TELEPHONE (OUTPATIENT)
Dept: NURSING | Facility: CLINIC | Age: 75
End: 2025-01-07
Payer: COMMERCIAL

## 2025-01-08 ENCOUNTER — VIRTUAL VISIT (OUTPATIENT)
Dept: INTERNAL MEDICINE | Facility: CLINIC | Age: 75
End: 2025-01-08
Payer: COMMERCIAL

## 2025-01-08 DIAGNOSIS — M10.042 ACUTE IDIOPATHIC GOUT OF LEFT HAND: ICD-10-CM

## 2025-01-08 PROCEDURE — 98006 SYNCH AUDIO-VIDEO EST MOD 30: CPT | Performed by: NURSE PRACTITIONER

## 2025-01-08 RX ORDER — PREDNISONE 20 MG/1
TABLET ORAL
Qty: 20 TABLET | Refills: 0 | Status: SHIPPED | OUTPATIENT
Start: 2025-01-08

## 2025-01-08 ASSESSMENT — PATIENT HEALTH QUESTIONNAIRE - PHQ9
SUM OF ALL RESPONSES TO PHQ QUESTIONS 1-9: 4
SUM OF ALL RESPONSES TO PHQ QUESTIONS 1-9: 4
10. IF YOU CHECKED OFF ANY PROBLEMS, HOW DIFFICULT HAVE THESE PROBLEMS MADE IT FOR YOU TO DO YOUR WORK, TAKE CARE OF THINGS AT HOME, OR GET ALONG WITH OTHER PEOPLE: VERY DIFFICULT

## 2025-01-08 NOTE — PATIENT INSTRUCTIONS
Prednisone   Take in morning with food    Take 3 tabs by mouth daily x 3 days, then 2 tabs daily x 3 days, then 1 tab daily x 3 days, then 1/2 tab daily x 3 days.     If not improving would need to be seen

## 2025-01-08 NOTE — PROGRESS NOTES
Ailyn is a 74 year old who is being evaluated via a billable video visit.    How would you like to obtain your AVS? MyChart  If the video visit is dropped, the invitation should be resent by: Text to cell phone: [phone]  Will anyone else be joining your video visit? No      Assessment & Plan     Acute idiopathic gout of left hand  Discussed   - predniSONE (DELTASONE) 20 MG tablet; Take 3 tabs by mouth daily x 3 days, then 2 tabs daily x 3 days, then 1 tab daily x 3 days, then 1/2 tab daily x 3 days.      30 minutes spent by me on the date of the encounter doing chart review, history and exam, documentation and further activities per the note        Patient Instructions   Prednisone   Take in morning with food    Take 3 tabs by mouth daily x 3 days, then 2 tabs daily x 3 days, then 1 tab daily x 3 days, then 1/2 tab daily x 3 days.     If not improving would need to be seen     Subjective   Ailyn is a 74 year old, presenting for the following health issues:  No chief complaint on file.    HPI   Gout 2002   Usually take ibuprofen and tylenol and get better     Left hand swelling and pain   Really bad last Friday   Lasting 4 days     Tried to go to UC x 4 and ER - wait for 5 hours   Was not seen anywhere     Last time last winter had a bad flare   allopurinol has been discussed in the past but she does not feel like she wants to do that-we discussed she could not do it even if she wanted to right now because do not start it when she is in a flare but when she is over that it could be a consideration      May be sugar increase triggered it     Did not tolerate indocin  Had issue with insurance covering colchicine  Prednisone worked in past      She declined any pain medication at this time   Does not usually need this         Review of Systems  Constitutional, neuro, ENT, endocrine, pulmonary, cardiac, gastrointestinal, genitourinary, musculoskeletal, integument and psychiatric systems are negative, except as  otherwise noted.      Objective           Vitals:  No vitals were obtained today due to virtual visit.    Physical Exam   GENERAL: alert and pain in left hand and swelling   EYES: Eyes grossly normal to inspection.  No discharge or erythema, or obvious scleral/conjunctival abnormalities.  RESP: No audible wheeze, cough, or visible cyanosis.    MS- swelling and pain left hand like previous gout   NEURO: Cranial nerves grossly intact.  Mentation and speech appropriate for age.  PSYCH: Appropriate affect, tone, and pace of words          Video-Visit Details    Type of service:  Video Visit   356 pm - 424  Originating Location (pt. Location): Home    Distant Location (provider location):  On-site  Platform used for Video Visit: Maritza  Signed Electronically by: JUNI Spence CNP

## 2025-01-08 NOTE — TELEPHONE ENCOUNTER
Patient is calling to request medication for gout flare up.  Her left hand is swollen and warm, painful.    Pt states that she tried to make an appt but there were none available and urgent cares are on capacity.  She is also wondering if she can get a refill on diclofenac.    Informed patient that she needs to be evaluated to receive treatment.  Advised to make an appt tomorrow and to call early or go on mychart early tomorrow to make an appt.  Also informed patient that diclofenac was prescribed by sports medicine.  She is to call tomorrow to request for refill.    Patient verbalized understanding.    Eryn Santiago, RN, BSN Nurse Triage Advisor 1/7/2025 6:18 PM

## 2025-01-14 ENCOUNTER — TELEPHONE (OUTPATIENT)
Dept: FAMILY MEDICINE | Facility: CLINIC | Age: 75
End: 2025-01-14
Payer: COMMERCIAL

## 2025-01-14 NOTE — TELEPHONE ENCOUNTER
Patient Quality Outreach    Patient is due for the following:   Breast Cancer Screening - Mammogram    Action(s) Taken:   Schedule a office visit for mammogram    Type of outreach:    Sent [employer] message.    Questions for provider review:    None           Mame Alicea, CMA

## 2025-01-29 DIAGNOSIS — E55.9 VITAMIN D INSUFFICIENCY: ICD-10-CM

## 2025-01-29 RX ORDER — ERGOCALCIFEROL 1.25 MG/1
CAPSULE, LIQUID FILLED ORAL
Qty: 8 CAPSULE | Refills: 0 | Status: SHIPPED | OUTPATIENT
Start: 2025-01-29

## 2025-04-06 DIAGNOSIS — E55.9 VITAMIN D INSUFFICIENCY: ICD-10-CM

## 2025-04-07 RX ORDER — ERGOCALCIFEROL 1.25 MG/1
CAPSULE, LIQUID FILLED ORAL
Qty: 8 CAPSULE | Refills: 0 | OUTPATIENT
Start: 2025-04-07

## 2025-06-03 ENCOUNTER — OFFICE VISIT (OUTPATIENT)
Dept: FAMILY MEDICINE | Facility: CLINIC | Age: 75
End: 2025-06-03
Payer: COMMERCIAL

## 2025-06-03 VITALS
DIASTOLIC BLOOD PRESSURE: 80 MMHG | RESPIRATION RATE: 20 BRPM | SYSTOLIC BLOOD PRESSURE: 130 MMHG | OXYGEN SATURATION: 98 % | BODY MASS INDEX: 36.16 KG/M2 | HEIGHT: 64 IN | HEART RATE: 64 BPM | TEMPERATURE: 97.8 F | WEIGHT: 211.8 LBS

## 2025-06-03 DIAGNOSIS — N18.31 STAGE 3A CHRONIC KIDNEY DISEASE (H): ICD-10-CM

## 2025-06-03 DIAGNOSIS — L20.82 FLEXURAL ECZEMA: ICD-10-CM

## 2025-06-03 DIAGNOSIS — I10 BENIGN ESSENTIAL HYPERTENSION: ICD-10-CM

## 2025-06-03 DIAGNOSIS — M10.042 ACUTE IDIOPATHIC GOUT OF LEFT HAND: ICD-10-CM

## 2025-06-03 DIAGNOSIS — E78.5 HYPERLIPIDEMIA, UNSPECIFIED HYPERLIPIDEMIA TYPE: Primary | ICD-10-CM

## 2025-06-03 LAB
ANION GAP SERPL CALCULATED.3IONS-SCNC: 14 MMOL/L (ref 7–15)
BUN SERPL-MCNC: 28.3 MG/DL (ref 8–23)
CALCIUM SERPL-MCNC: 9.8 MG/DL (ref 8.8–10.4)
CHLORIDE SERPL-SCNC: 103 MMOL/L (ref 98–107)
CHOLEST SERPL-MCNC: 130 MG/DL
CREAT SERPL-MCNC: 1.03 MG/DL (ref 0.51–0.95)
EGFRCR SERPLBLD CKD-EPI 2021: 56 ML/MIN/1.73M2
FASTING STATUS PATIENT QL REPORTED: YES
FASTING STATUS PATIENT QL REPORTED: YES
GLUCOSE SERPL-MCNC: 111 MG/DL (ref 70–99)
HCO3 SERPL-SCNC: 23 MMOL/L (ref 22–29)
HDLC SERPL-MCNC: 38 MG/DL
LDLC SERPL CALC-MCNC: 62 MG/DL
NONHDLC SERPL-MCNC: 92 MG/DL
POTASSIUM SERPL-SCNC: 4.1 MMOL/L (ref 3.4–5.3)
SODIUM SERPL-SCNC: 140 MMOL/L (ref 135–145)
TRIGL SERPL-MCNC: 150 MG/DL

## 2025-06-03 PROCEDURE — 99214 OFFICE O/P EST MOD 30 MIN: CPT

## 2025-06-03 PROCEDURE — 3079F DIAST BP 80-89 MM HG: CPT

## 2025-06-03 PROCEDURE — 80061 LIPID PANEL: CPT

## 2025-06-03 PROCEDURE — G2211 COMPLEX E/M VISIT ADD ON: HCPCS

## 2025-06-03 PROCEDURE — 3075F SYST BP GE 130 - 139MM HG: CPT

## 2025-06-03 PROCEDURE — 80048 BASIC METABOLIC PNL TOTAL CA: CPT

## 2025-06-03 PROCEDURE — 36415 COLL VENOUS BLD VENIPUNCTURE: CPT

## 2025-06-03 RX ORDER — VALSARTAN 80 MG/1
80 TABLET ORAL DAILY
Qty: 90 TABLET | Refills: 0 | Status: SHIPPED | OUTPATIENT
Start: 2025-06-03 | End: 2025-09-01

## 2025-06-03 RX ORDER — TRIAMCINOLONE ACETONIDE 1 MG/G
CREAM TOPICAL 2 TIMES DAILY
Qty: 30 G | Refills: 0 | Status: SHIPPED | OUTPATIENT
Start: 2025-06-03

## 2025-06-03 RX ORDER — HYDROCHLOROTHIAZIDE 12.5 MG/1
12.5 CAPSULE ORAL DAILY
Qty: 10 CAPSULE | Refills: 0 | Status: SHIPPED | OUTPATIENT
Start: 2025-06-03 | End: 2025-06-13

## 2025-06-03 RX ORDER — ATORVASTATIN CALCIUM 20 MG/1
20 TABLET, FILM COATED ORAL DAILY
Qty: 90 TABLET | Refills: 1 | Status: SHIPPED | OUTPATIENT
Start: 2025-06-03

## 2025-06-03 RX ORDER — PREDNISONE 20 MG/1
TABLET ORAL
Qty: 20 TABLET | Refills: 0 | Status: SHIPPED | OUTPATIENT
Start: 2025-06-03

## 2025-06-03 RX ORDER — ATENOLOL 25 MG/1
25 TABLET ORAL DAILY
Qty: 90 TABLET | Refills: 1 | Status: SHIPPED | OUTPATIENT
Start: 2025-06-03

## 2025-06-03 NOTE — PROGRESS NOTES
Assessment & Plan     (E78.5) Hyperlipidemia, unspecified hyperlipidemia type  (primary encounter diagnosis)  Comment:  lipid panel needing to be updated.  Needs refill on Lipitor.  Appropriate today.  Will follow-up on lipid panel whether further direction is necessary. Patient fully understands and is agreeable with plan of care, at this point patient will follow up as needed unless acute concerns arise in the meantime.  Plan: Lipid panel reflex to direct LDL Fasting,         atorvastatin (LIPITOR) 20 MG tablet    (I10) Benign essential hypertension  Comment: Stable today.  However, patient continues to have frequent gout flares.  Patient has had conversations of starting allopurinol.  However, patient does take hydrochlorothiazide which certainly contributes to risk of gout flares.  Shared decision to taper off Dyazide and switch to valsartan.  Will refill patient's atenolol.  Will follow-up with patient in 1 month. Discussed medication risks and benefits of valsartan with patient in detail with patient verbal understanding. Patient fully understands and is agreeable with plan of care, at this point patient will follow up as needed unless acute concerns arise in the meantime.  Plan: hydrochlorothiazide (MICROZIDE) 12.5 MG         capsule, valsartan (DIOVAN) 80 MG tablet,         atenolol (TENORMIN) 25 MG tablet, PRIMARY CARE         FOLLOW-UP SCHEDULING    (M10.042) Acute idiopathic gout of left hand  Comment: Active flare.  Will treat with prednisone taper.  Patient has tolerated this in the past.  Plan: predniSONE (DELTASONE) 20 MG tablet    (N18.31) Stage 3a chronic kidney disease (H)  Comment: Patient due for labs.  BMP ordered.  Will follow-up on results and whether further direction is necessary.  Plan: BASIC METABOLIC PANEL    (L20.82) Flexural eczema  Comment: Patient has flexural rash on bilateral inner arms.  Suspect flexural eczema.  Will treat with triamcinolone. Discussed medication risks and  "benefits of triamcinolone with patient in detail with patient verbal understanding.  Recommend consistent emollient use such as Aquaphor or Vaseline as well.  Patient fully understands and is agreeable with plan of care, at this point patient will follow up as needed unless acute concerns arise in the meantime.  Plan: triamcinolone (KENALOG) 0.1 % external cream     The longitudinal plan of care for the diagnosis(es)/condition(s) as documented were addressed during this visit. Due to the added complexity in care, I will continue to support Ailyn in the subsequent management and with ongoing continuity of care.    BMI  Estimated body mass index is 36.36 kg/m  as calculated from the following:    Height as of this encounter: 1.626 m (5' 4\").    Weight as of this encounter: 96.1 kg (211 lb 12.8 oz).     Follow-up    Follow-up Visit   Expected date:  Jul 03, 2025 (Approximate)      Follow Up Appointment Details:     Follow-up with whom?: Me    Follow-Up for what?: Chronic Disease f/u    Chronic Disease f/u: Hypertension    How?: In Person                 Subjective   Ailyn is a 75 year old, presenting for the following health issues:  Lipids (/), Hypertension, Recheck Medication (Requesting gout medication and would like a 90 day supply on certain meds ), and Derm Problem (Rash on both arms )        6/3/2025    10:20 AM   Additional Questions   Roomed by Anaya   Accompanied by self     History of Present Illness       Reason for visit:  Need medication renewed and cholesterol checked   She is taking medications regularly.        Hyperlipidemia Follow-Up    Are you regularly taking any medication or supplement to lower your cholesterol?   Yes- Atorvastatin  Are you having muscle aches or other side effects that you think could be caused by your cholesterol lowering medication?  No    Hypertension Follow-up    Do you check your blood pressure regularly outside of the clinic? No  Are you following a low salt diet? " "No  Are your blood pressures ever more than 140 on the top number (systolic) OR more   than 90 on the bottom number (diastolic), for example 140/90? No    BP Readings from Last 2 Encounters:   06/03/25 130/80   10/30/24 132/75     How many servings of fruits and vegetables do you eat daily?  2-3  On average, how many sweetened beverages do you drink each day (Examples: soda, juice, sweet tea, etc.  Do NOT count diet or artificially sweetened beverages)?   0  How many days per week do you exercise enough to make your heart beat faster? 3 or less  How many minutes a day do you exercise enough to make your heart beat faster? 9 or less  How many days per week do you miss taking your medication? 0      Review of Systems  Constitutional, HEENT, cardiovascular, pulmonary, gi and gu systems are negative, except as otherwise noted.      Objective    /80 (BP Location: Right arm, Patient Position: Sitting, Cuff Size: Adult Regular)   Pulse 64   Temp 97.8  F (36.6  C) (Oral)   Resp 20   Ht 1.626 m (5' 4\")   Wt 96.1 kg (211 lb 12.8 oz)   LMP  (LMP Unknown)   SpO2 98%   BMI 36.36 kg/m    Body mass index is 36.36 kg/m .  Physical Exam  Vitals and nursing note reviewed.   Constitutional:       Appearance: Normal appearance. She is well-developed. She is not ill-appearing or toxic-appearing.   Cardiovascular:      Rate and Rhythm: Normal rate.   Pulmonary:      Effort: Pulmonary effort is normal.   Neurological:      Mental Status: She is alert.   Psychiatric:         Attention and Perception: Attention and perception normal.         Mood and Affect: Mood normal.         Speech: Speech normal.         Behavior: Behavior normal. Behavior is cooperative.         Thought Content: Thought content normal.         Judgment: Judgment normal.        Signed Electronically by: JUNI River CNP    "

## 2025-06-04 ENCOUNTER — RESULTS FOLLOW-UP (OUTPATIENT)
Dept: FAMILY MEDICINE | Facility: CLINIC | Age: 75
End: 2025-06-04
Payer: COMMERCIAL

## 2025-06-04 NOTE — TELEPHONE ENCOUNTER
RN called and spoke with patient-     Relayed message from JUNI River CNP (see previous note). Follow-up appt scheduled for 7/3/25. Patient was given an opportunity to ask questions, verbalized understanding of plan, and is agreeable.     Jessica WILEY RN  [facility]

## 2025-06-10 DIAGNOSIS — I10 BENIGN ESSENTIAL HYPERTENSION: ICD-10-CM

## 2025-06-11 DIAGNOSIS — I10 BENIGN ESSENTIAL HYPERTENSION: ICD-10-CM

## 2025-06-11 RX ORDER — HYDROCHLOROTHIAZIDE 12.5 MG/1
1 CAPSULE ORAL DAILY
Qty: 90 CAPSULE | OUTPATIENT
Start: 2025-06-11

## 2025-06-11 RX ORDER — HYDROCHLOROTHIAZIDE 12.5 MG/1
CAPSULE ORAL
Qty: 10 CAPSULE | Refills: 0 | OUTPATIENT
Start: 2025-06-11

## 2025-06-11 RX ORDER — HYDROCHLOROTHIAZIDE 12.5 MG/1
12.5 CAPSULE ORAL DAILY
Qty: 30 CAPSULE | Refills: 0 | Status: SHIPPED | OUTPATIENT
Start: 2025-06-11

## 2025-06-11 NOTE — TELEPHONE ENCOUNTER
Please call patient:      I refilled the hydrochlorothiazide for 30 days, but no more refills until seen in clinic for follow-up.  Keep appointment of 7-3-25 or be seen sooner.      Thank you,   JUNI Jauregui CNP

## 2025-06-11 NOTE — TELEPHONE ENCOUNTER
Called patient. Left generic voice message advising requested medication refill request was sent in, [facility] message sent with further details, and to return call at [phone] if any further questions.    KATYA LynchN RN  [facility]

## 2025-06-25 ENCOUNTER — NURSE TRIAGE (OUTPATIENT)
Dept: FAMILY MEDICINE | Facility: CLINIC | Age: 75
End: 2025-06-25
Payer: COMMERCIAL

## 2025-06-25 NOTE — TELEPHONE ENCOUNTER
Nurse Triage SBAR    Is this a 2nd Level Triage? YES, LICENSED PRACTITIONER REVIEW IS REQUIRED    Situation: Swelling in bilateral legs/ankles/feet    Background: Was recently weaned off her diuretic due to risk for gout flare ups with the hydrochlorothiazide.  Stopped it on June 19th.  Started Valsartan in place-was taking 40mg then.  Now taking 80mg-started on the 19th not taken Valsartan yet today-BP now 172/89, 173/89.  Yesterday was 150/76.  HR 66.  But she was only taking 3/4 of a tablet of the Valsartan for most of the days except the last two days she took the full tablet. She's been taking the valsartan in the evening-should she change to morning?     Assessment:   Feeling anxious right now.    BP elevated at 172/89 and 173/89, declines any symptoms including difficulty breathing, chest pain, blurry vision, or other neuro symptoms.    She complains of swelling from her feet up into her legs-her calves are tight.    She has gained between 5-10 lbs-but not sure on the accuracy of her scale.      Protocol Recommended Disposition:   See in Office Within 3 Days, See in Office Today, See More Appropriate Protocol    Recommendation: Recommended to be seen today-no appointments available.  Pt did not want to go to  so appointment tomorrow was scheduled 6/26 in [place] with JUNI Alfaro CNP at 1:30pm.  Huddled with Dr. Crowe as patient wondering if can take the diuretic now but MD declines and to wait for appointment.  Appointment for tomorrow okay per Dr. Crowe as well.      Does the patient meet one of the following criteria for ADS visit consideration? 16+ years old, with an [facility] PCP     TIP  Providers, please consider if this condition is appropriate for management at one of our Acute and Diagnostic Services sites.     If patient is a good candidate, please use dotphrase <dot>triageresponse and select Refer to ADS to document.      Reason for Disposition   MODERATE swelling of both ankles (e.g.,  swelling extends up to the knees) AND new-onset or getting worse   Leg swelling (e.g., ankle swelling extends up to shins or knees)   Systolic BP >= 160 OR Diastolic >= 100    Additional Information   Negative: Sounds like a life-threatening emergency to the triager   Negative: Chest pain   Negative: Followed an insect bite and has localized swelling (e.g., small area of puffy or swollen skin)   Negative: Followed a knee injury   Negative: Ankle injury   Negative: Pregnant with leg swelling or edema   Negative: Difficulty breathing at rest   Negative: Entire foot is cool or blue in comparison to other side   Negative: Cast on leg or ankle and has increasing pain   Negative: Can't walk or can barely stand (new-onset)   Negative: Fever and red area (or area very tender to touch)   Negative: Patient sounds very sick or weak to the triager   Negative: SEVERE swelling (e.g., swelling extends above knee, entire leg is swollen, weeping fluid)   Negative: Pregnant 20 or more weeks and sudden weight gain (i.e., > 2 lbs, 1 kg in one week)   Negative: Thigh or calf pain and only 1 side and present > 1 hour   Negative: Thigh, calf, or ankle swelling in only one leg   Negative: Thigh, calf, or ankle swelling in both legs, but one side is definitely more swollen (Exception: Longstanding difference between legs.)   Negative: Difficulty breathing with exertion AND new-onset or getting worse   Negative: Chest Pain   Negative: Followed an ankle injury   Negative: Followed a bee sting and has localized swelling (e.g., small area of puffy or swollen skin)   Negative: Followed an insect bite and has localized swelling (e.g., small area of puffy or swollen skin)   Negative: Ankle pain is main symptom   Negative: Swelling of calf or leg is main symptom   Negative: Sounds like a life-threatening emergency to the triager   Negative: Symptom is main concern (e.g., headache, chest pain)   Negative: Low blood pressure is main concern    Negative: Systolic BP >= 160 OR Diastolic >= 100, and any cardiac (e.g., breathing difficulty, chest pain) or neurologic symptoms (e.g., new-onset blurred or double vision)   Negative: Pregnant 20 or more weeks (or postpartum < 6 weeks) with new hand or face swelling   Negative: Pregnant 20 or more weeks (or postpartum < 6 weeks) and Systolic BP >= 160 OR Diastolic >= 110   Negative: Patient sounds very sick or weak to the triager   Negative: Systolic BP >= 200 OR Diastolic >= 120 and having NO cardiac or neurologic symptoms   Negative: Pregnant 20 or more weeks (or postpartum < 6 weeks) with Systolic BP >= 140 OR Diastolic >= 90   Negative: Systolic BP >= 180 OR Diastolic >= 110, and missed most recent dose of blood pressure medication   Negative: Systolic BP >= 180 OR Diastolic >= 110   Negative: Patient wants to be seen   Negative: Ran out of BP medications   Negative: Taking BP medications and feels is having side effects (e.g., impotence, cough, dizziness)    Protocols used: Ankle Swelling-A-OH, Leg Swelling and Edema-A-OH, Blood Pressure - High-A-OH    Nayeli Chen RN BSN  Clinic Nurse  [facility]

## 2025-06-26 ENCOUNTER — OFFICE VISIT (OUTPATIENT)
Dept: FAMILY MEDICINE | Facility: CLINIC | Age: 75
End: 2025-06-26
Payer: COMMERCIAL

## 2025-06-26 ENCOUNTER — RESULTS FOLLOW-UP (OUTPATIENT)
Dept: FAMILY MEDICINE | Facility: CLINIC | Age: 75
End: 2025-06-26

## 2025-06-26 VITALS
RESPIRATION RATE: 18 BRPM | HEIGHT: 64 IN | BODY MASS INDEX: 37.28 KG/M2 | DIASTOLIC BLOOD PRESSURE: 74 MMHG | OXYGEN SATURATION: 98 % | TEMPERATURE: 98.1 F | HEART RATE: 65 BPM | WEIGHT: 218.4 LBS | SYSTOLIC BLOOD PRESSURE: 146 MMHG

## 2025-06-26 DIAGNOSIS — I10 ESSENTIAL HYPERTENSION: ICD-10-CM

## 2025-06-26 DIAGNOSIS — R60.0 BILATERAL LEG EDEMA: Primary | ICD-10-CM

## 2025-06-26 DIAGNOSIS — R60.0 BILATERAL LEG EDEMA: ICD-10-CM

## 2025-06-26 LAB
ALBUMIN UR-MCNC: NEGATIVE MG/DL
APPEARANCE UR: CLEAR
BILIRUB UR QL STRIP: NEGATIVE
COLOR UR AUTO: YELLOW
GLUCOSE UR STRIP-MCNC: NEGATIVE MG/DL
HGB UR QL STRIP: NEGATIVE
KETONES UR STRIP-MCNC: NEGATIVE MG/DL
LEUKOCYTE ESTERASE UR QL STRIP: NEGATIVE
NITRATE UR QL: NEGATIVE
PH UR STRIP: 5.5 [PH] (ref 5–7)
SP GR UR STRIP: <=1.005 (ref 1–1.03)
UROBILINOGEN UR STRIP-ACNC: 0.2 E.U./DL

## 2025-06-26 RX ORDER — FUROSEMIDE 20 MG/1
10 TABLET ORAL DAILY PRN
Qty: 15 TABLET | Refills: 0 | Status: SHIPPED | OUTPATIENT
Start: 2025-06-26

## 2025-06-26 RX ORDER — FUROSEMIDE 20 MG/1
TABLET ORAL
Qty: 45 TABLET | OUTPATIENT
Start: 2025-06-26

## 2025-06-26 ASSESSMENT — ENCOUNTER SYMPTOMS
GASTROINTESTINAL NEGATIVE: 1
DIFFICULTY URINATING: 0
PALPITATIONS: 0
DIZZINESS: 0
FEVER: 0
CHEST TIGHTNESS: 0
SHORTNESS OF BREATH: 0
CHILLS: 0
MUSCULOSKELETAL NEGATIVE: 1
HEADACHES: 0

## 2025-06-26 ASSESSMENT — PAIN SCALES - GENERAL: PAINLEVEL_OUTOF10: MILD PAIN (2)

## 2025-06-26 NOTE — PROGRESS NOTES
"  Assessment & Plan     Bilateral leg edema  New onset bilateral LE edema after stopping her chronic hydrochlorothiazide. No prior work up for this. Edema mild today but Pt did take 1 dose of hydrochlorothiazide last night and reports good improvement today. Will check labs today to evaluate for possible causes. Will trial low dose of furosemide PRN only. Keep close follow up with regular clinic next week to reassess and determine if further med adjustments/labs needed. Questions answered. Precautions discussed.    - furosemide (LASIX) 20 MG tablet; Take 0.5 tablets (10 mg) by mouth daily as needed (swelling).  - NT-proBNP; Future  - TSH with free T4 reflex; Future  - UA Macroscopic with reflex to Microscopic and Culture - Lab Collect; Future  - Comprehensive metabolic panel (BMP + Alb, Alk Phos, ALT, AST, Total. Bili, TP)      Essential hypertension  BP above goal today but just made transition to new medications last week. Will keep valsartan dose the same for now and keep regular follow up with primary clinic next week. Continue to monitor at home.           BMI  Estimated body mass index is 37.49 kg/m  as calculated from the following:    Height as of this encounter: 1.626 m (5' 4\").    Weight as of this encounter: 99.1 kg (218 lb 6.4 oz).   Weight management plan: Patient was referred to their PCP to discuss a diet and exercise plan.      Future Appointments   Date Time Provider Department Center   7/3/2025 10:30 AM Marcelino Vargas APRN CNP [facility]   8/12/2025  3:30 PM [address]   9/8/2025  1:00 PM Vesta Diana PA-C [facility]         Jenny Robertson is a 75 year old, presenting for the following health issues:  Swelling (Bilateral legs/ankles/feet)        6/26/2025     1:08 PM   Additional Questions   Roomed by Joceline TOMAS     History of Present Illness       Reason for visit:  Selling in legs/ ankle/ feet  Symptom onset:  1-3 days ago  Symptoms include:  Swelling in feet/ankles/ " "legs  Symptom intensity:  Severe  Symptom progression:  Improving  Had these symptoms before:  No  What makes it better:  Hydrochlorthiazide    She eats 2-3 servings of fruits and vegetables daily.She consumes 0 sweetened beverage(s) daily.She exercises with enough effort to increase her heart rate 9 or less minutes per day.  She exercises with enough effort to increase her heart rate 3 or less days per week.   She is taking medications regularly.      Triaged by RN yesterday. Hydrochlorothiazide recently discontinued due to frequent gout flares. Transitioned to valsartan. Concern about increased leg swelling without diuretic. Home BP has also been elevated in 150s-170s/70-80s. Was so uncomfortable, she took an hydrochlorothiazide last night.  Swelling is down a lot already.  No prior issues with swelling. Was on hydrochlorothiazide over 10 years. Denies cardiac history. Is quite sedentary. Moderate sodium in diet.     Review of Systems   Constitutional:  Negative for chills and fever.   HENT: Negative.     Respiratory:  Negative for chest tightness and shortness of breath.    Cardiovascular:  Positive for leg swelling. Negative for chest pain and palpitations.   Gastrointestinal: Negative.    Genitourinary:  Negative for difficulty urinating.   Musculoskeletal: Negative.    Neurological:  Negative for dizziness and headaches.           Objective    BP (!) 146/74   Pulse 65   Temp 98.1  F (36.7  C) (Oral)   Resp 18   Ht 1.626 m (5' 4\")   Wt 99.1 kg (218 lb 6.4 oz)   LMP  (LMP Unknown)   SpO2 98%   BMI 37.49 kg/m    Body mass index is 37.49 kg/m .  BP Readings from Last 6 Encounters:   06/26/25 (!) 146/74   06/03/25 130/80   10/30/24 132/75   10/09/24 130/66   08/16/24 (!) 153/84   06/26/24 132/75     Wt Readings from Last 3 Encounters:   06/26/25 99.1 kg (218 lb 6.4 oz)   06/03/25 96.1 kg (211 lb 12.8 oz)   10/30/24 95.3 kg (210 lb 3.2 oz)       Physical Exam  Vitals and nursing note reviewed. "   Constitutional:       Appearance: Normal appearance.   HENT:      Head: Normocephalic.      Nose: Nose normal.      Mouth/Throat:      Mouth: Mucous membranes are moist.      Pharynx: Oropharynx is clear.   Eyes:      Conjunctiva/sclera: Conjunctivae normal.   Cardiovascular:      Rate and Rhythm: Normal rate and regular rhythm.      Heart sounds: Normal heart sounds.   Pulmonary:      Effort: Pulmonary effort is normal.      Breath sounds: Normal breath sounds.   Musculoskeletal:         General: Normal range of motion.      Right lower leg: Edema present.      Left lower leg: Edema present.      Comments: 1+ edema noted to bilateral ankles   Skin:     General: Skin is warm and dry.   Neurological:      General: No focal deficit present.      Mental Status: She is alert and oriented to person, place, and time.   Psychiatric:         Mood and Affect: Mood normal.         Behavior: Behavior normal.              Signed Electronically by: JUNI Smith CNP

## 2025-07-03 ENCOUNTER — OFFICE VISIT (OUTPATIENT)
Dept: FAMILY MEDICINE | Facility: CLINIC | Age: 75
End: 2025-07-03
Payer: COMMERCIAL

## 2025-07-03 VITALS
OXYGEN SATURATION: 97 % | DIASTOLIC BLOOD PRESSURE: 70 MMHG | BODY MASS INDEX: 35.74 KG/M2 | HEART RATE: 71 BPM | WEIGHT: 214.5 LBS | HEIGHT: 65 IN | TEMPERATURE: 98.6 F | SYSTOLIC BLOOD PRESSURE: 129 MMHG

## 2025-07-03 DIAGNOSIS — R60.0 BILATERAL LEG EDEMA: Primary | ICD-10-CM

## 2025-07-03 DIAGNOSIS — R79.89 ELEVATED BRAIN NATRIURETIC PEPTIDE (BNP) LEVEL: ICD-10-CM

## 2025-07-03 DIAGNOSIS — R21 RASH AND NONSPECIFIC SKIN ERUPTION: ICD-10-CM

## 2025-07-03 DIAGNOSIS — R01.1 HEART MURMUR: ICD-10-CM

## 2025-07-03 DIAGNOSIS — I10 BENIGN ESSENTIAL HYPERTENSION: ICD-10-CM

## 2025-07-03 RX ORDER — VALSARTAN 80 MG/1
80 TABLET ORAL DAILY
Qty: 90 TABLET | Refills: 1 | Status: SHIPPED | OUTPATIENT
Start: 2025-07-03

## 2025-07-03 RX ORDER — FUROSEMIDE 20 MG/1
20 TABLET ORAL DAILY PRN
Qty: 30 TABLET | Refills: 0 | Status: SHIPPED | OUTPATIENT
Start: 2025-07-03

## 2025-07-03 ASSESSMENT — PAIN SCALES - GENERAL: PAINLEVEL_OUTOF10: NO PAIN (0)

## 2025-07-03 NOTE — PROGRESS NOTES
Assessment & Plan     (R60.0) Bilateral leg edema  (primary encounter diagnosis)  Comment: Improved.  Patient's BNP elevated from recent visit.  Would like to stay with Lasix.  Continue 20 mg daily.  Patient's BMP adequate.  Will repeat BMP next week to trend.  Echocardiogram to evaluate new murmur and elevated BNP. Will follow up on results and whether further direction is necessary. Patient fully understands and is agreeable with plan of care, at this point patient will follow up as needed unless acute concerns arise in the meantime.  Plan: furosemide (LASIX) 20 MG tablet, NT-proBNP,         PRIMARY CARE FOLLOW-UP SCHEDULING    (I10) Benign essential hypertension  Comment: Stable.  No changes to valsartan.  As above per Lasix.  Follow-up if noting blood pressure >140/90 routinely at home. Patient fully understands and is agreeable with plan of care, at this point patient will follow up as needed unless acute concerns arise in the meantime.  Plan: valsartan (DIOVAN) 80 MG tablet    (R01.1) Heart murmur  (R79.89) Elevated brain natriuretic peptide (BNP) level  Comment: New murmur noted.  Recent BNP elevated.  Opting to get echocardiogram to evaluate.  Repeat BNP in 1 week after utilization of Lasix.  Will follow-up on results whether further direction is necessary.  Plan: Echocardiogram Complete with [facility]         CARE FOLLOW-UP SCHEDULING    (R21) Rash and nonspecific skin eruption  Comment: Ketoconazole worked well.  Seems to be fungal.  Okay to utilize.  Follow-up if not noting any improvement     Follow-up   No follow-ups on file.     Follow-up Visit   Expected date:  Jul 10, 2025 (Approximate)      Follow Up Appointment Details:     Follow-up with whom?: Other Primary Care Services    Follow-Up for what?: Lab Visit    How?: In Person    Is this an as-needed follow-up?: No                 Subjective   Ailyn is a 75 year old, presenting for the following health issues:  Swelling (Swelling in the  "legs / ankle / feet follow up since medication) and Recheck Medication (furosemide (LASIX) 20 MG tablet /Bilateral leg edema [R60.0]  - Primary)        7/3/2025    10:07 AM   Additional Questions   Roomed by fanny ball     History of Present Illness       Reason for visit:  Selling in legs/ ankle/ feet  Symptom onset:  1-2 weeks ago  Symptom intensity:  Severe  Symptom progression:  Improving    She eats 2-3 servings of fruits and vegetables daily.She consumes 0 sweetened beverage(s) daily.She exercises with enough effort to increase her heart rate 9 or less minutes per day.  She exercises with enough effort to increase her heart rate 3 or less days per week.   She is taking medications regularly.        Bilater leg edema  How many servings of fruits and vegetables do you eat daily?  2-3  On average, how many sweetened beverages do you drink each day (Examples: soda, juice, sweet tea, etc.  Do NOT count diet or artificially sweetened beverages)?   0  How many days per week do you exercise enough to make your heart beat faster? 3 or less  How many minutes a day do you exercise enough to make your heart beat faster? 9 or less  How many days per week do you miss taking your medication? 0      Review of Systems  Constitutional, HEENT, cardiovascular, pulmonary, gi and gu systems are negative, except as otherwise noted.      Objective    /70 (BP Location: Right arm, Patient Position: Sitting, Cuff Size: Adult Large)   Pulse 71   Temp 98.6  F (37  C) (Oral)   Ht 1.651 m (5' 5\")   Wt 97.3 kg (214 lb 8 oz)   LMP  (LMP Unknown)   SpO2 97%   BMI 35.69 kg/m    Body mass index is 35.69 kg/m .  Physical Exam  Vitals and nursing note reviewed.   Constitutional:       General: She is not in acute distress.     Appearance: Normal appearance.   Cardiovascular:      Rate and Rhythm: Normal rate and regular rhythm.      Heart sounds: Murmur heard.      No friction rub. No gallop.   Pulmonary:      Effort: " Pulmonary effort is normal. No respiratory distress.      Breath sounds: No wheezing, rhonchi or rales.   Musculoskeletal:      Right lower leg: No edema.      Left lower leg: No edema.   Skin:     General: Skin is warm and dry.   Neurological:      Mental Status: She is alert.   Psychiatric:         Mood and Affect: Mood normal.         Behavior: Behavior normal. Behavior is cooperative.         Thought Content: Thought content normal.         Judgment: Judgment normal.        Signed Electronically by: JUNI River CNP

## 2025-07-07 DIAGNOSIS — R60.0 BILATERAL LEG EDEMA: ICD-10-CM

## 2025-07-07 RX ORDER — FUROSEMIDE 20 MG/1
TABLET ORAL
Qty: 90 TABLET | OUTPATIENT
Start: 2025-07-07

## 2025-07-10 ENCOUNTER — LAB (OUTPATIENT)
Dept: LAB | Facility: CLINIC | Age: 75
End: 2025-07-10
Payer: COMMERCIAL

## 2025-07-10 DIAGNOSIS — I10 BENIGN ESSENTIAL HYPERTENSION: ICD-10-CM

## 2025-07-10 DIAGNOSIS — R60.0 BILATERAL LEG EDEMA: ICD-10-CM

## 2025-07-10 RX ORDER — HYDROCHLOROTHIAZIDE 12.5 MG/1
1 CAPSULE ORAL DAILY
Qty: 90 CAPSULE | Refills: 0 | Status: SHIPPED | OUTPATIENT
Start: 2025-07-10

## 2025-07-10 NOTE — TELEPHONE ENCOUNTER
Needs visit for refills.  Due for wellness. Take ROSALBA spot for physical in October/November for refills

## 2025-07-15 NOTE — TELEPHONE ENCOUNTER
Sent MyChart x1 attempt.    Kristin HERRON, - [facility]  [facility]

## 2025-07-21 ENCOUNTER — HOSPITAL ENCOUNTER (OUTPATIENT)
Dept: CARDIOLOGY | Facility: CLINIC | Age: 75
Discharge: HOME | End: 2025-07-21
Payer: COMMERCIAL

## 2025-07-21 DIAGNOSIS — R79.89 ELEVATED BRAIN NATRIURETIC PEPTIDE (BNP) LEVEL: ICD-10-CM

## 2025-07-21 DIAGNOSIS — R01.1 HEART MURMUR: ICD-10-CM

## 2025-07-21 LAB — LVEF ECHO: NORMAL

## 2025-07-21 PROCEDURE — 93306 TTE W/DOPPLER COMPLETE: CPT | Mod: 26 | Performed by: INTERNAL MEDICINE

## 2025-07-21 PROCEDURE — 255N000002 HC RX 255 OP 636

## 2025-07-21 PROCEDURE — 999N000208 ECHOCARDIOGRAM COMPLETE

## 2025-07-21 RX ADMIN — HUMAN ALBUMIN MICROSPHERES AND PERFLUTREN 3 ML (DILUTED): 10; .22 INJECTION, SOLUTION INTRAVENOUS at 09:03

## 2025-07-22 ENCOUNTER — PATIENT OUTREACH (OUTPATIENT)
Dept: CARE COORDINATION | Facility: CLINIC | Age: 75
End: 2025-07-22
Payer: COMMERCIAL

## 2025-07-24 ENCOUNTER — PATIENT OUTREACH (OUTPATIENT)
Dept: CARE COORDINATION | Facility: CLINIC | Age: 75
End: 2025-07-24
Payer: COMMERCIAL

## 2025-08-12 ENCOUNTER — HOSPITAL ENCOUNTER (OUTPATIENT)
Dept: MRI IMAGING | Facility: CLINIC | Age: 75
Discharge: HOME | End: 2025-08-12
Attending: UROLOGY
Payer: COMMERCIAL

## 2025-08-12 DIAGNOSIS — N28.1 ACQUIRED CYST OF KIDNEY: ICD-10-CM

## 2025-08-12 PROCEDURE — A9585 GADOBUTROL INJECTION: HCPCS | Performed by: UROLOGY

## 2025-08-12 PROCEDURE — 74183 MRI ABD W/O CNTR FLWD CNTR: CPT

## 2025-08-12 PROCEDURE — 255N000002 HC RX 255 OP 636: Performed by: UROLOGY

## 2025-08-12 RX ORDER — GADOBUTROL 604.72 MG/ML
10 INJECTION INTRAVENOUS ONCE
Status: COMPLETED | OUTPATIENT
Start: 2025-08-12 | End: 2025-08-12

## 2025-08-12 RX ADMIN — GADOBUTROL 10 ML: 604.72 INJECTION INTRAVENOUS at 15:23

## 2025-08-18 ENCOUNTER — RESULTS FOLLOW-UP (OUTPATIENT)
Dept: FAMILY MEDICINE | Facility: CLINIC | Age: 75
End: 2025-08-18

## 2025-08-18 ENCOUNTER — OFFICE VISIT (OUTPATIENT)
Dept: FAMILY MEDICINE | Facility: CLINIC | Age: 75
End: 2025-08-18
Payer: COMMERCIAL

## 2025-08-18 VITALS
HEART RATE: 74 BPM | HEIGHT: 65 IN | BODY MASS INDEX: 35.65 KG/M2 | DIASTOLIC BLOOD PRESSURE: 72 MMHG | WEIGHT: 214 LBS | OXYGEN SATURATION: 95 % | SYSTOLIC BLOOD PRESSURE: 113 MMHG | RESPIRATION RATE: 18 BRPM | TEMPERATURE: 98.5 F

## 2025-08-18 DIAGNOSIS — N28.1 SIMPLE RENAL CYST: ICD-10-CM

## 2025-08-18 DIAGNOSIS — I10 BENIGN ESSENTIAL HYPERTENSION: ICD-10-CM

## 2025-08-18 DIAGNOSIS — M10.9 ACUTE GOUT INVOLVING TOE OF LEFT FOOT, UNSPECIFIED CAUSE: Primary | ICD-10-CM

## 2025-08-18 DIAGNOSIS — R60.0 BILATERAL LEG EDEMA: ICD-10-CM

## 2025-08-18 DIAGNOSIS — N18.31 STAGE 3A CHRONIC KIDNEY DISEASE (H): ICD-10-CM

## 2025-08-18 DIAGNOSIS — I35.8 AORTIC VALVE SCLEROSIS: ICD-10-CM

## 2025-08-18 DIAGNOSIS — N18.31 STAGE 3A CHRONIC KIDNEY DISEASE (H): Primary | ICD-10-CM

## 2025-08-18 DIAGNOSIS — R79.89 ELEVATED BRAIN NATRIURETIC PEPTIDE (BNP) LEVEL: ICD-10-CM

## 2025-08-18 LAB
ANION GAP SERPL CALCULATED.3IONS-SCNC: 13 MMOL/L (ref 7–15)
BUN SERPL-MCNC: 30.2 MG/DL (ref 8–23)
CALCIUM SERPL-MCNC: 9.3 MG/DL (ref 8.8–10.4)
CHLORIDE SERPL-SCNC: 104 MMOL/L (ref 98–107)
CREAT SERPL-MCNC: 1.15 MG/DL (ref 0.51–0.95)
EGFRCR SERPLBLD CKD-EPI 2021: 49 ML/MIN/1.73M2
GLUCOSE SERPL-MCNC: 119 MG/DL (ref 70–99)
HCO3 SERPL-SCNC: 24 MMOL/L (ref 22–29)
NT-PROBNP SERPL-MCNC: 506 PG/ML (ref 0–624)
POTASSIUM SERPL-SCNC: 3.4 MMOL/L (ref 3.4–5.3)
SODIUM SERPL-SCNC: 141 MMOL/L (ref 135–145)

## 2025-08-18 PROCEDURE — 99214 OFFICE O/P EST MOD 30 MIN: CPT

## 2025-08-18 PROCEDURE — 83880 ASSAY OF NATRIURETIC PEPTIDE: CPT

## 2025-08-18 PROCEDURE — 36415 COLL VENOUS BLD VENIPUNCTURE: CPT

## 2025-08-18 PROCEDURE — 80048 BASIC METABOLIC PNL TOTAL CA: CPT

## 2025-08-18 PROCEDURE — 3078F DIAST BP <80 MM HG: CPT

## 2025-08-18 PROCEDURE — 3074F SYST BP LT 130 MM HG: CPT

## 2025-08-18 RX ORDER — PREDNISONE 10 MG/1
TABLET ORAL
Qty: 20 TABLET | Refills: 0 | Status: SHIPPED | OUTPATIENT
Start: 2025-08-18

## 2025-08-18 RX ORDER — FUROSEMIDE 20 MG/1
20 TABLET ORAL DAILY
Qty: 90 TABLET | Refills: 0 | Status: SHIPPED | OUTPATIENT
Start: 2025-08-18

## 2025-09-03 ENCOUNTER — LAB (OUTPATIENT)
Dept: LAB | Facility: CLINIC | Age: 75
End: 2025-09-03
Payer: COMMERCIAL

## 2025-09-04 ENCOUNTER — RESULTS FOLLOW-UP (OUTPATIENT)
Dept: FAMILY MEDICINE | Facility: CLINIC | Age: 75
End: 2025-09-04
Payer: COMMERCIAL